# Patient Record
Sex: FEMALE | Race: WHITE | NOT HISPANIC OR LATINO | Employment: OTHER | URBAN - METROPOLITAN AREA
[De-identification: names, ages, dates, MRNs, and addresses within clinical notes are randomized per-mention and may not be internally consistent; named-entity substitution may affect disease eponyms.]

---

## 2023-03-10 DIAGNOSIS — J45.909 ASTHMA, UNSPECIFIED ASTHMA SEVERITY, UNSPECIFIED WHETHER COMPLICATED, UNSPECIFIED WHETHER PERSISTENT (HHS-HCC): ICD-10-CM

## 2023-03-10 DIAGNOSIS — J44.9 CHRONIC OBSTRUCTIVE PULMONARY DISEASE, UNSPECIFIED (MULTI): ICD-10-CM

## 2023-03-10 RX ORDER — IPRATROPIUM BROMIDE AND ALBUTEROL SULFATE 2.5; .5 MG/3ML; MG/3ML
SOLUTION RESPIRATORY (INHALATION)
Qty: 270 ML | Refills: 1 | Status: SHIPPED | OUTPATIENT
Start: 2023-03-10

## 2023-03-13 DIAGNOSIS — J44.9 CHRONIC OBSTRUCTIVE PULMONARY DISEASE, UNSPECIFIED COPD TYPE (MULTI): ICD-10-CM

## 2023-03-13 PROBLEM — E78.5 HYPERLIPIDEMIA: Status: ACTIVE | Noted: 2023-03-13

## 2023-03-13 PROBLEM — R55 NEAR SYNCOPE: Status: ACTIVE | Noted: 2023-03-13

## 2023-03-13 PROBLEM — J42 CHRONIC BRONCHITIS (MULTI): Status: ACTIVE | Noted: 2023-03-13

## 2023-03-13 PROBLEM — J98.4 PNEUMONITIS: Status: ACTIVE | Noted: 2023-03-13

## 2023-03-13 PROBLEM — E87.6 HYPOKALEMIA: Status: ACTIVE | Noted: 2023-03-13

## 2023-03-13 PROBLEM — R60.0 LEG EDEMA: Status: ACTIVE | Noted: 2023-03-13

## 2023-03-13 PROBLEM — M48.56XA: Status: ACTIVE | Noted: 2023-03-13

## 2023-03-13 PROBLEM — K76.0 FATTY LIVER: Status: ACTIVE | Noted: 2023-03-13

## 2023-03-13 PROBLEM — T14.8XXA MUSCLE STRAIN: Status: ACTIVE | Noted: 2023-03-13

## 2023-03-13 PROBLEM — N39.0 UTI (URINARY TRACT INFECTION): Status: ACTIVE | Noted: 2023-03-13

## 2023-03-13 PROBLEM — G60.9 IDIOPATHIC PERIPHERAL NEUROPATHY: Status: ACTIVE | Noted: 2023-03-13

## 2023-03-13 PROBLEM — I10 HTN (HYPERTENSION): Status: ACTIVE | Noted: 2023-03-13

## 2023-03-13 PROBLEM — E78.49 FAMILIAL COMBINED HYPERLIPIDEMIA: Status: ACTIVE | Noted: 2023-03-13

## 2023-03-13 PROBLEM — R20.0 NUMBNESS AND TINGLING: Status: ACTIVE | Noted: 2023-03-13

## 2023-03-13 PROBLEM — E83.42 HYPOMAGNESEMIA: Status: ACTIVE | Noted: 2023-03-13

## 2023-03-13 PROBLEM — L98.9 FACIAL SKIN LESION: Status: ACTIVE | Noted: 2023-03-13

## 2023-03-13 PROBLEM — R53.83 FATIGUE: Status: ACTIVE | Noted: 2023-03-13

## 2023-03-13 PROBLEM — F10.10 ALCOHOL ABUSE: Status: ACTIVE | Noted: 2023-03-13

## 2023-03-13 PROBLEM — R91.8 OPACITY OF LUNG ON IMAGING STUDY: Status: ACTIVE | Noted: 2023-03-13

## 2023-03-13 PROBLEM — K74.60 CIRRHOSIS (MULTI): Status: ACTIVE | Noted: 2023-03-13

## 2023-03-13 PROBLEM — M62.830 BACK MUSCLE SPASM: Status: ACTIVE | Noted: 2023-03-13

## 2023-03-13 PROBLEM — R20.2 NUMBNESS AND TINGLING: Status: ACTIVE | Noted: 2023-03-13

## 2023-03-13 PROBLEM — E55.9 VITAMIN D DEFICIENCY: Status: ACTIVE | Noted: 2023-03-13

## 2023-03-13 PROBLEM — J45.909 ASTHMA (HHS-HCC): Status: ACTIVE | Noted: 2023-03-13

## 2023-03-13 PROBLEM — A08.4 VIRAL GASTROENTERITIS: Status: ACTIVE | Noted: 2023-03-13

## 2023-03-13 PROBLEM — R06.02 SOB (SHORTNESS OF BREATH): Status: ACTIVE | Noted: 2023-03-13

## 2023-03-13 PROBLEM — R18.8 ASCITES: Status: ACTIVE | Noted: 2023-03-13

## 2023-03-13 PROBLEM — I73.9 PAD (PERIPHERAL ARTERY DISEASE) (CMS-HCC): Status: ACTIVE | Noted: 2023-03-13

## 2023-03-13 PROBLEM — E86.0 DEHYDRATION: Status: ACTIVE | Noted: 2023-03-13

## 2023-03-13 PROBLEM — I65.29 CAROTID STENOSIS: Status: ACTIVE | Noted: 2023-03-13

## 2023-03-13 PROBLEM — R00.2 HEART PALPITATIONS: Status: ACTIVE | Noted: 2023-03-13

## 2023-03-13 PROBLEM — R14.0 DISTENDED ABDOMEN: Status: ACTIVE | Noted: 2023-03-13

## 2023-03-17 RX ORDER — PANTOPRAZOLE SODIUM 40 MG/1
1 TABLET, DELAYED RELEASE ORAL DAILY
COMMUNITY
Start: 2020-08-04 | End: 2024-05-23 | Stop reason: ENTERED-IN-ERROR

## 2023-03-17 RX ORDER — FOLIC ACID 1 MG/1
1 TABLET ORAL DAILY
COMMUNITY
Start: 2020-08-04 | End: 2024-05-23 | Stop reason: ENTERED-IN-ERROR

## 2023-03-17 RX ORDER — LANOLIN ALCOHOL/MO/W.PET/CERES
1 CREAM (GRAM) TOPICAL DAILY
COMMUNITY
Start: 2020-08-04 | End: 2024-05-23 | Stop reason: ENTERED-IN-ERROR

## 2023-03-17 RX ORDER — IPRATROPIUM BROMIDE AND ALBUTEROL SULFATE 2.5; .5 MG/3ML; MG/3ML
SOLUTION RESPIRATORY (INHALATION)
COMMUNITY
Start: 2020-11-12 | End: 2024-05-23 | Stop reason: ENTERED-IN-ERROR

## 2023-03-17 RX ORDER — FERROUS SULFATE 325(65) MG
1 TABLET ORAL DAILY
COMMUNITY
Start: 2020-08-04 | End: 2024-05-23 | Stop reason: ENTERED-IN-ERROR

## 2023-03-17 RX ORDER — ALBUTEROL SULFATE 90 UG/1
2 AEROSOL, METERED RESPIRATORY (INHALATION) EVERY 4 HOURS PRN
COMMUNITY
Start: 2020-08-04

## 2023-03-17 RX ORDER — UBIDECARENONE 75 MG
1 CAPSULE ORAL DAILY
COMMUNITY
Start: 2020-08-04 | End: 2024-05-23 | Stop reason: ENTERED-IN-ERROR

## 2023-03-17 RX ORDER — FLUTICASONE FUROATE, UMECLIDINIUM BROMIDE AND VILANTEROL TRIFENATATE 100; 62.5; 25 UG/1; UG/1; UG/1
1 POWDER RESPIRATORY (INHALATION) DAILY
Qty: 90 EACH | Refills: 3 | Status: ON HOLD | OUTPATIENT
Start: 2023-03-17 | End: 2024-05-26

## 2023-05-10 DIAGNOSIS — T14.8XXA MUSCLE STRAIN: ICD-10-CM

## 2023-05-11 RX ORDER — DICLOFENAC SODIUM 10 MG/G
4 GEL TOPICAL 4 TIMES DAILY PRN
Qty: 100 G | Refills: 0 | Status: SHIPPED | OUTPATIENT
Start: 2023-05-11 | End: 2024-05-27 | Stop reason: HOSPADM

## 2023-06-26 DIAGNOSIS — J44.9 CHRONIC OBSTRUCTIVE PULMONARY DISEASE, UNSPECIFIED COPD TYPE (MULTI): ICD-10-CM

## 2023-06-26 RX ORDER — FLUTICASONE PROPIONATE AND SALMETEROL 250; 50 UG/1; UG/1
1 POWDER RESPIRATORY (INHALATION)
Qty: 60 EACH | Refills: 11 | Status: SHIPPED | OUTPATIENT
Start: 2023-06-26 | End: 2024-06-05 | Stop reason: ALTCHOICE

## 2023-06-30 ENCOUNTER — OFFICE VISIT (OUTPATIENT)
Dept: PRIMARY CARE | Facility: CLINIC | Age: 73
End: 2023-06-30
Payer: MEDICARE

## 2023-06-30 VITALS
BODY MASS INDEX: 21.44 KG/M2 | SYSTOLIC BLOOD PRESSURE: 160 MMHG | RESPIRATION RATE: 12 BRPM | OXYGEN SATURATION: 95 % | WEIGHT: 141 LBS | DIASTOLIC BLOOD PRESSURE: 84 MMHG | HEART RATE: 105 BPM | TEMPERATURE: 97.9 F

## 2023-06-30 DIAGNOSIS — J44.9 CHRONIC OBSTRUCTIVE PULMONARY DISEASE, UNSPECIFIED COPD TYPE (MULTI): ICD-10-CM

## 2023-06-30 DIAGNOSIS — H04.129 DRY EYE: ICD-10-CM

## 2023-06-30 DIAGNOSIS — I10 HYPERTENSION, UNSPECIFIED TYPE: ICD-10-CM

## 2023-06-30 DIAGNOSIS — J44.9 CHRONIC OBSTRUCTIVE PULMONARY DISEASE, UNSPECIFIED COPD TYPE (MULTI): Primary | ICD-10-CM

## 2023-06-30 DIAGNOSIS — G89.29 CHRONIC NONINTRACTABLE HEADACHE, UNSPECIFIED HEADACHE TYPE: ICD-10-CM

## 2023-06-30 DIAGNOSIS — R51.9 CHRONIC NONINTRACTABLE HEADACHE, UNSPECIFIED HEADACHE TYPE: ICD-10-CM

## 2023-06-30 PROCEDURE — 1159F MED LIST DOCD IN RCRD: CPT | Performed by: INTERNAL MEDICINE

## 2023-06-30 PROCEDURE — 1036F TOBACCO NON-USER: CPT | Performed by: INTERNAL MEDICINE

## 2023-06-30 PROCEDURE — 1157F ADVNC CARE PLAN IN RCRD: CPT | Performed by: INTERNAL MEDICINE

## 2023-06-30 PROCEDURE — 3079F DIAST BP 80-89 MM HG: CPT | Performed by: INTERNAL MEDICINE

## 2023-06-30 PROCEDURE — 3077F SYST BP >= 140 MM HG: CPT | Performed by: INTERNAL MEDICINE

## 2023-06-30 PROCEDURE — 99214 OFFICE O/P EST MOD 30 MIN: CPT | Performed by: INTERNAL MEDICINE

## 2023-06-30 RX ORDER — FLUTICASONE PROPIONATE 220 UG/1
2 AEROSOL, METERED RESPIRATORY (INHALATION)
Qty: 12 G | Refills: 5 | Status: SHIPPED | OUTPATIENT
Start: 2023-06-30 | End: 2023-06-30

## 2023-06-30 RX ORDER — FLUTICASONE PROPIONATE 220 UG/1
2 AEROSOL, METERED RESPIRATORY (INHALATION)
Qty: 12 G | Refills: 2 | Status: SHIPPED | OUTPATIENT
Start: 2023-06-30 | End: 2024-05-27 | Stop reason: HOSPADM

## 2023-06-30 ASSESSMENT — ENCOUNTER SYMPTOMS: HEADACHES: 1

## 2023-06-30 NOTE — ASSESSMENT & PLAN NOTE
Elevated in the office today.  Patient states that this is more elevated than her home blood pressure readings.  Likely due to stress.  Continue to monitor.
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
## 2023-06-30 NOTE — ASSESSMENT & PLAN NOTE
Patient reports no recent exacerbation despite current air quality.  Denies worsening cough or colored phlegm.  Has been using nebulizer for rescue treatment.  Begin Flovent daily for maintenance treatment.

## 2023-09-19 ENCOUNTER — PATIENT OUTREACH (OUTPATIENT)
Dept: CARE COORDINATION | Facility: CLINIC | Age: 73
End: 2023-09-19
Payer: MEDICARE

## 2023-09-19 NOTE — PROGRESS NOTES
Outreach call to patient to support a smooth transition of care from recent admission.  Left voicemail message for patient with my contact information.  Cecilia Rubio RN/CM

## 2023-09-25 DIAGNOSIS — J44.9 CHRONIC OBSTRUCTIVE PULMONARY DISEASE, UNSPECIFIED COPD TYPE (MULTI): Primary | ICD-10-CM

## 2023-09-27 PROBLEM — I45.9 CARDIAC CONDUCTION DISORDER: Status: ACTIVE | Noted: 2023-09-15

## 2023-09-27 PROBLEM — I10 ESSENTIAL (PRIMARY) HYPERTENSION: Status: ACTIVE | Noted: 2020-08-04

## 2023-09-27 PROBLEM — R00.0 TACHYCARDIA: Status: ACTIVE | Noted: 2023-09-27

## 2023-09-27 PROBLEM — R79.89 OTHER SPECIFIED ABNORMAL FINDINGS OF BLOOD CHEMISTRY: Status: ACTIVE | Noted: 2023-09-27

## 2023-09-27 PROBLEM — G60.9 HEREDITARY AND IDIOPATHIC NEUROPATHY: Status: ACTIVE | Noted: 2020-08-04

## 2023-09-27 PROBLEM — I70.203: Status: ACTIVE | Noted: 2020-08-04

## 2023-09-27 PROBLEM — E83.30 DISORDER OF PHOSPHORUS METABOLISM: Status: ACTIVE | Noted: 2020-08-04

## 2023-09-27 PROBLEM — Z87.81 PERSONAL HISTORY OF (HEALED) TRAUMATIC FRACTURE: Status: ACTIVE | Noted: 2020-08-04

## 2023-09-27 PROBLEM — K70.30 ALCOHOLIC CIRRHOSIS OF LIVER WITHOUT ASCITES (MULTI): Status: ACTIVE | Noted: 2020-08-04

## 2023-09-27 PROBLEM — F17.200 NICOTINE DEPENDENCE: Status: ACTIVE | Noted: 2020-08-04

## 2023-09-27 PROBLEM — M06.9 RHEUMATOID ARTHRITIS (MULTI): Status: ACTIVE | Noted: 2020-08-04

## 2023-09-27 PROBLEM — K70.10 ALCOHOLIC HEPATITIS WITHOUT ASCITES (MULTI): Status: ACTIVE | Noted: 2020-08-04

## 2023-09-27 RX ORDER — MULTIVITAMIN WITH IRON
TABLET ORAL
COMMUNITY
Start: 2020-08-10 | End: 2024-05-23 | Stop reason: ENTERED-IN-ERROR

## 2023-09-27 RX ORDER — MIDODRINE HYDROCHLORIDE 2.5 MG/1
TABLET ORAL
COMMUNITY
Start: 2020-07-30 | End: 2024-05-23 | Stop reason: ENTERED-IN-ERROR

## 2023-09-27 RX ORDER — POTASSIUM CHLORIDE 20 MEQ/1
TABLET, EXTENDED RELEASE ORAL
COMMUNITY
Start: 2020-08-04 | End: 2024-05-23 | Stop reason: ENTERED-IN-ERROR

## 2023-09-27 RX ORDER — LACTULOSE 10 G/15ML
SOLUTION ORAL; RECTAL
COMMUNITY
Start: 2020-08-11 | End: 2024-05-23 | Stop reason: ENTERED-IN-ERROR

## 2023-09-27 RX ORDER — FUROSEMIDE 20 MG/1
TABLET ORAL
COMMUNITY
Start: 2020-07-31 | End: 2024-05-23 | Stop reason: ENTERED-IN-ERROR

## 2023-10-02 ENCOUNTER — OFFICE VISIT (OUTPATIENT)
Dept: SURGERY | Facility: CLINIC | Age: 73
End: 2023-10-02
Payer: MEDICARE

## 2023-10-02 DIAGNOSIS — Z09 POSTOPERATIVE EXAMINATION: Primary | ICD-10-CM

## 2023-10-02 DIAGNOSIS — M05.7A RHEUMATOID ARTHRITIS OF OTHER SITE WITH POSITIVE RHEUMATOID FACTOR (MULTI): ICD-10-CM

## 2023-10-02 DIAGNOSIS — K70.30 ALCOHOLIC CIRRHOSIS OF LIVER WITHOUT ASCITES (MULTI): ICD-10-CM

## 2023-10-02 PROCEDURE — 1159F MED LIST DOCD IN RCRD: CPT | Performed by: SURGERY

## 2023-10-02 PROCEDURE — 1126F AMNT PAIN NOTED NONE PRSNT: CPT | Performed by: SURGERY

## 2023-10-02 PROCEDURE — 1160F RVW MEDS BY RX/DR IN RCRD: CPT | Performed by: SURGERY

## 2023-10-02 PROCEDURE — 99024 POSTOP FOLLOW-UP VISIT: CPT | Performed by: SURGERY

## 2023-10-02 ASSESSMENT — PAIN SCALES - GENERAL: PAINLEVEL: 0-NO PAIN

## 2023-10-02 NOTE — PROGRESS NOTES
Subjective   The patient is status post laparoscopic appendectomy for perforated appendix.  The patient is not having any pain. Patient is eating well.    Objective   Physical Exam:  General: well appearing, no acute distress, alert and oriented x 3  Eyes: PERRLA, EOMI  HEENT: normal  Neck: supple  Pulmonary: lungs clear to auscultation bilaterally  CV: RR, S1S2, no murmurs  Abdomen: soft, non tender, no masses  Minimal soreness around umbilicus,  Wounds all nicely healed.    MS: grossly normal  Neurologic: alert and oriented, strength/sensation intact  Skin: non jaundiced, no lesions    Pathology:   eport Name JARRED VALDEZ                                                                                                   Accession #: [id number]            Pathologist:                   DINA OBRIEN MD  Date of Procedure:    9/15/2023  Date Received:          9/15/2023  Date Reported           9/21/2023  Submitting Physician:   KWABENA LOMELI MD.  Location:                    [facility]  Copy To/Referring/Attending:  LILLIE ALEXANDER Other External #                                                                   FINAL DIAGNOSIS  A.  APPENDIX:  -- ACUTE APPENDICITIS       Assessment/Plan   Status post laparoscopic appendectomy for perforated appendix.  Excellent recovery.  She can resume activities as tolerated.  Follow-up with PCP later this fall.  She will see me as needed

## 2023-10-02 NOTE — PROGRESS NOTES
History Of Present Illness  Svitlana Perry is a 73 y.o. female presenting with ***.     Past Medical History  She has no past medical history on file.    Surgical History  She has a past surgical history that includes Other surgical history (02/15/2016); MR angio neck wo IV contrast (8/19/2019); MR angio head wo IV contrast (8/19/2019); and MR angio neck wo IV contrast (11/30/2022).     Social History  She reports that she has been smoking cigarettes. She has never used smokeless tobacco. She reports that she does not currently use alcohol. She reports that she does not use drugs.    Family History  No family history on file.     Allergies  Patient has no known allergies.    Review of Systems     Physical Exam     Last Recorded Vitals  There were no vitals taken for this visit.    Relevant Results  {If you would like to pull in Medications, type .meds     If you would like to pull in Lab results for the last 24 hours, type .[id number]    If you would like to pull in Imaging results, type .imgrslt :99}      ***     Assessment/Plan   {Assess/PlanSmartLinks:73628}    ***       I spent *** minutes in the professional and overall care of this patient.      Abilio Woodruff MD

## 2023-10-02 NOTE — LETTER
Svitlana Perry recovering quite well following recent laparoscopic appendectomy.  I will see her as needed.  Please call for any questions    Tadeo

## 2023-10-05 ENCOUNTER — PATIENT OUTREACH (OUTPATIENT)
Dept: CARE COORDINATION | Facility: CLINIC | Age: 73
End: 2023-10-05
Payer: MEDICARE

## 2023-10-05 NOTE — PROGRESS NOTES
Outreach call to patient following up on appointment with primary care provider. Left voicemail message with CM name and contact number.  Will continue to follow.    Cecilia Rubio RN/MARRY  [phone]

## 2023-11-02 ENCOUNTER — PATIENT OUTREACH (OUTPATIENT)
Dept: CARE COORDINATION | Facility: CLINIC | Age: 73
End: 2023-11-02
Payer: MEDICARE

## 2023-11-02 NOTE — PROGRESS NOTES
Outreach call to patient to check in 30 days after hospital discharge to support smooth transition of care.  Left voicemail message with CM name and contact number.Patient with no additional needs noted. No additional outreach needed at this time.   Cecilia Rubio RN

## 2024-02-29 ENCOUNTER — APPOINTMENT (OUTPATIENT)
Dept: PRIMARY CARE | Facility: CLINIC | Age: 74
End: 2024-02-29
Payer: COMMERCIAL

## 2024-03-07 ENCOUNTER — APPOINTMENT (OUTPATIENT)
Dept: PRIMARY CARE | Facility: CLINIC | Age: 74
End: 2024-03-07
Payer: COMMERCIAL

## 2024-05-23 ENCOUNTER — APPOINTMENT (OUTPATIENT)
Dept: RADIOLOGY | Facility: HOSPITAL | Age: 74
DRG: 640 | End: 2024-05-23
Payer: COMMERCIAL

## 2024-05-23 ENCOUNTER — APPOINTMENT (OUTPATIENT)
Dept: CARDIOLOGY | Facility: HOSPITAL | Age: 74
DRG: 640 | End: 2024-05-23
Payer: COMMERCIAL

## 2024-05-23 ENCOUNTER — HOSPITAL ENCOUNTER (INPATIENT)
Facility: HOSPITAL | Age: 74
LOS: 4 days | Discharge: HOME HEALTH | DRG: 640 | End: 2024-05-27
Attending: STUDENT IN AN ORGANIZED HEALTH CARE EDUCATION/TRAINING PROGRAM | Admitting: INTERNAL MEDICINE
Payer: COMMERCIAL

## 2024-05-23 DIAGNOSIS — R53.1 GENERALIZED WEAKNESS: Primary | ICD-10-CM

## 2024-05-23 DIAGNOSIS — J45.909 ASTHMA, UNSPECIFIED ASTHMA SEVERITY, UNSPECIFIED WHETHER COMPLICATED, UNSPECIFIED WHETHER PERSISTENT (HHS-HCC): ICD-10-CM

## 2024-05-23 DIAGNOSIS — E83.42 HYPOMAGNESEMIA: ICD-10-CM

## 2024-05-23 DIAGNOSIS — E87.6 HYPOKALEMIA: ICD-10-CM

## 2024-05-23 DIAGNOSIS — K70.10 ALCOHOLIC HEPATITIS WITHOUT ASCITES (MULTI): ICD-10-CM

## 2024-05-23 DIAGNOSIS — J44.1 COPD EXACERBATION (MULTI): ICD-10-CM

## 2024-05-23 DIAGNOSIS — R62.7 FAILURE TO THRIVE IN ADULT: ICD-10-CM

## 2024-05-23 PROBLEM — R09.02 HYPOXIA: Status: ACTIVE | Noted: 2024-05-23

## 2024-05-23 PROBLEM — R79.89 ELEVATED TROPONIN: Status: ACTIVE | Noted: 2024-05-23

## 2024-05-23 PROBLEM — R21 RASH: Status: ACTIVE | Noted: 2024-05-23

## 2024-05-23 PROBLEM — E87.1 HYPONATREMIA: Status: ACTIVE | Noted: 2024-05-23

## 2024-05-23 PROBLEM — R74.8 ELEVATED LIVER ENZYMES: Status: ACTIVE | Noted: 2024-05-23

## 2024-05-23 PROBLEM — R26.2 UNABLE TO AMBULATE: Status: ACTIVE | Noted: 2024-05-23

## 2024-05-23 LAB
ALBUMIN SERPL BCP-MCNC: 3.8 G/DL (ref 3.4–5)
ALP SERPL-CCNC: 411 U/L (ref 33–136)
ALT SERPL W P-5'-P-CCNC: 42 U/L (ref 7–45)
ANION GAP BLDV CALCULATED.4IONS-SCNC: 13 MMOL/L (ref 10–25)
ANION GAP SERPL CALC-SCNC: 23 MMOL/L (ref 10–20)
APPEARANCE UR: ABNORMAL
APTT PPP: 29 SECONDS (ref 27–38)
AST SERPL W P-5'-P-CCNC: 106 U/L (ref 9–39)
BACTERIA #/AREA URNS AUTO: ABNORMAL /HPF
BASE EXCESS BLDV CALC-SCNC: -0.7 MMOL/L (ref -2–3)
BASOPHILS # BLD AUTO: 0.04 X10*3/UL (ref 0–0.1)
BASOPHILS NFR BLD AUTO: 0.9 %
BILIRUB SERPL-MCNC: 4.8 MG/DL (ref 0–1.2)
BILIRUB UR STRIP.AUTO-MCNC: ABNORMAL MG/DL
BNP SERPL-MCNC: 120 PG/ML (ref 0–99)
BODY TEMPERATURE: 37 DEGREES CELSIUS
BUN SERPL-MCNC: 16 MG/DL (ref 6–23)
CA-I BLDV-SCNC: 1.24 MMOL/L (ref 1.1–1.33)
CALCIUM SERPL-MCNC: 8.8 MG/DL (ref 8.6–10.3)
CARDIAC TROPONIN I PNL SERPL HS: 12 NG/L (ref 0–13)
CARDIAC TROPONIN I PNL SERPL HS: 15 NG/L (ref 0–13)
CHLORIDE BLDV-SCNC: 95 MMOL/L (ref 98–107)
CHLORIDE SERPL-SCNC: 94 MMOL/L (ref 98–107)
CK SERPL-CCNC: 30 U/L (ref 0–215)
CO2 SERPL-SCNC: 18 MMOL/L (ref 21–32)
COLOR UR: YELLOW
CREAT SERPL-MCNC: 0.69 MG/DL (ref 0.5–1.05)
D DIMER PPP FEU-MCNC: 1824 NG/ML FEU
EGFRCR SERPLBLD CKD-EPI 2021: >90 ML/MIN/1.73M*2
EOSINOPHIL # BLD AUTO: 0.07 X10*3/UL (ref 0–0.4)
EOSINOPHIL NFR BLD AUTO: 1.5 %
ERYTHROCYTE [DISTWIDTH] IN BLOOD BY AUTOMATED COUNT: 12.9 % (ref 11.5–14.5)
FLUAV RNA RESP QL NAA+PROBE: NOT DETECTED
FLUBV RNA RESP QL NAA+PROBE: NOT DETECTED
GLUCOSE BLDV-MCNC: 114 MG/DL (ref 74–99)
GLUCOSE SERPL-MCNC: 68 MG/DL (ref 74–99)
GLUCOSE UR STRIP.AUTO-MCNC: NORMAL MG/DL
HCO3 BLDV-SCNC: 24.2 MMOL/L (ref 22–26)
HCT VFR BLD AUTO: 33.1 % (ref 36–46)
HCT VFR BLD EST: 38 % (ref 36–46)
HGB BLD-MCNC: 12.2 G/DL (ref 12–16)
HGB BLDV-MCNC: 12.7 G/DL (ref 12–16)
IMM GRANULOCYTES # BLD AUTO: 0.11 X10*3/UL (ref 0–0.5)
IMM GRANULOCYTES NFR BLD AUTO: 2.4 % (ref 0–0.9)
INHALED O2 CONCENTRATION: 21 %
INR PPP: 1 (ref 0.9–1.1)
KETONES UR STRIP.AUTO-MCNC: ABNORMAL MG/DL
LACTATE BLDV-SCNC: 1.4 MMOL/L (ref 0.4–2)
LEUKOCYTE ESTERASE UR QL STRIP.AUTO: NEGATIVE
LYMPHOCYTES # BLD AUTO: 0.67 X10*3/UL (ref 0.8–3)
LYMPHOCYTES NFR BLD AUTO: 14.8 %
MAGNESIUM SERPL-MCNC: 1.3 MG/DL (ref 1.6–2.4)
MCH RBC QN AUTO: 36.1 PG (ref 26–34)
MCHC RBC AUTO-ENTMCNC: 36.9 G/DL (ref 32–36)
MCV RBC AUTO: 98 FL (ref 80–100)
MONOCYTES # BLD AUTO: 0.3 X10*3/UL (ref 0.05–0.8)
MONOCYTES NFR BLD AUTO: 6.6 %
MUCOUS THREADS #/AREA URNS AUTO: ABNORMAL /LPF
NEUTROPHILS # BLD AUTO: 3.35 X10*3/UL (ref 1.6–5.5)
NEUTROPHILS NFR BLD AUTO: 73.8 %
NITRITE UR QL STRIP.AUTO: NEGATIVE
NRBC BLD-RTO: 0 /100 WBCS (ref 0–0)
OXYHGB MFR BLDV: 47.2 % (ref 45–75)
PCO2 BLDV: 40 MM HG (ref 41–51)
PH BLDV: 7.39 PH (ref 7.33–7.43)
PH UR STRIP.AUTO: 6.5 [PH]
PLATELET # BLD AUTO: 59 X10*3/UL (ref 150–450)
PO2 BLDV: 25 MM HG (ref 35–45)
POTASSIUM BLDV-SCNC: 2.9 MMOL/L (ref 3.5–5.3)
POTASSIUM SERPL-SCNC: 3.3 MMOL/L (ref 3.5–5.3)
PROT SERPL-MCNC: 6.3 G/DL (ref 6.4–8.2)
PROT UR STRIP.AUTO-MCNC: ABNORMAL MG/DL
PROTHROMBIN TIME: 11.7 SECONDS (ref 9.8–12.8)
RBC # BLD AUTO: 3.38 X10*6/UL (ref 4–5.2)
RBC # UR STRIP.AUTO: ABNORMAL /UL
RBC #/AREA URNS AUTO: ABNORMAL /HPF
SAO2 % BLDV: 50 % (ref 45–75)
SARS-COV-2 RNA RESP QL NAA+PROBE: NOT DETECTED
SODIUM BLDV-SCNC: 129 MMOL/L (ref 136–145)
SODIUM SERPL-SCNC: 132 MMOL/L (ref 136–145)
SP GR UR STRIP.AUTO: 1.01
SQUAMOUS #/AREA URNS AUTO: ABNORMAL /HPF
UROBILINOGEN UR STRIP.AUTO-MCNC: ABNORMAL MG/DL
WBC # BLD AUTO: 4.5 X10*3/UL (ref 4.4–11.3)
WBC #/AREA URNS AUTO: ABNORMAL /HPF

## 2024-05-23 PROCEDURE — 82550 ASSAY OF CK (CPK): CPT | Performed by: STUDENT IN AN ORGANIZED HEALTH CARE EDUCATION/TRAINING PROGRAM

## 2024-05-23 PROCEDURE — 96365 THER/PROPH/DIAG IV INF INIT: CPT

## 2024-05-23 PROCEDURE — 99285 EMERGENCY DEPT VISIT HI MDM: CPT | Mod: 25

## 2024-05-23 PROCEDURE — 83735 ASSAY OF MAGNESIUM: CPT | Performed by: STUDENT IN AN ORGANIZED HEALTH CARE EDUCATION/TRAINING PROGRAM

## 2024-05-23 PROCEDURE — 2500000002 HC RX 250 W HCPCS SELF ADMINISTERED DRUGS (ALT 637 FOR MEDICARE OP, ALT 636 FOR OP/ED): Performed by: PHYSICIAN ASSISTANT

## 2024-05-23 PROCEDURE — 2500000004 HC RX 250 GENERAL PHARMACY W/ HCPCS (ALT 636 FOR OP/ED): Performed by: STUDENT IN AN ORGANIZED HEALTH CARE EDUCATION/TRAINING PROGRAM

## 2024-05-23 PROCEDURE — 83880 ASSAY OF NATRIURETIC PEPTIDE: CPT | Performed by: STUDENT IN AN ORGANIZED HEALTH CARE EDUCATION/TRAINING PROGRAM

## 2024-05-23 PROCEDURE — 36415 COLL VENOUS BLD VENIPUNCTURE: CPT | Performed by: STUDENT IN AN ORGANIZED HEALTH CARE EDUCATION/TRAINING PROGRAM

## 2024-05-23 PROCEDURE — 96366 THER/PROPH/DIAG IV INF ADDON: CPT

## 2024-05-23 PROCEDURE — 85025 COMPLETE CBC W/AUTO DIFF WBC: CPT | Performed by: STUDENT IN AN ORGANIZED HEALTH CARE EDUCATION/TRAINING PROGRAM

## 2024-05-23 PROCEDURE — 1100000001 HC PRIVATE ROOM DAILY

## 2024-05-23 PROCEDURE — 36415 COLL VENOUS BLD VENIPUNCTURE: CPT | Performed by: PHYSICIAN ASSISTANT

## 2024-05-23 PROCEDURE — 76705 ECHO EXAM OF ABDOMEN: CPT

## 2024-05-23 PROCEDURE — 84484 ASSAY OF TROPONIN QUANT: CPT | Performed by: STUDENT IN AN ORGANIZED HEALTH CARE EDUCATION/TRAINING PROGRAM

## 2024-05-23 PROCEDURE — 85379 FIBRIN DEGRADATION QUANT: CPT | Performed by: STUDENT IN AN ORGANIZED HEALTH CARE EDUCATION/TRAINING PROGRAM

## 2024-05-23 PROCEDURE — 76705 ECHO EXAM OF ABDOMEN: CPT | Performed by: RADIOLOGY

## 2024-05-23 PROCEDURE — 84132 ASSAY OF SERUM POTASSIUM: CPT | Mod: 91 | Performed by: STUDENT IN AN ORGANIZED HEALTH CARE EDUCATION/TRAINING PROGRAM

## 2024-05-23 PROCEDURE — 71275 CT ANGIOGRAPHY CHEST: CPT

## 2024-05-23 PROCEDURE — 93005 ELECTROCARDIOGRAM TRACING: CPT

## 2024-05-23 PROCEDURE — 71045 X-RAY EXAM CHEST 1 VIEW: CPT | Performed by: RADIOLOGY

## 2024-05-23 PROCEDURE — 87636 SARSCOV2 & INF A&B AMP PRB: CPT | Performed by: STUDENT IN AN ORGANIZED HEALTH CARE EDUCATION/TRAINING PROGRAM

## 2024-05-23 PROCEDURE — 71045 X-RAY EXAM CHEST 1 VIEW: CPT

## 2024-05-23 PROCEDURE — 96361 HYDRATE IV INFUSION ADD-ON: CPT

## 2024-05-23 PROCEDURE — 2500000001 HC RX 250 WO HCPCS SELF ADMINISTERED DRUGS (ALT 637 FOR MEDICARE OP): Performed by: STUDENT IN AN ORGANIZED HEALTH CARE EDUCATION/TRAINING PROGRAM

## 2024-05-23 PROCEDURE — 94640 AIRWAY INHALATION TREATMENT: CPT

## 2024-05-23 PROCEDURE — 71275 CT ANGIOGRAPHY CHEST: CPT | Performed by: RADIOLOGY

## 2024-05-23 PROCEDURE — 85730 THROMBOPLASTIN TIME PARTIAL: CPT | Mod: 91 | Performed by: STUDENT IN AN ORGANIZED HEALTH CARE EDUCATION/TRAINING PROGRAM

## 2024-05-23 PROCEDURE — 2500000006 HC RX 250 W HCPCS SELF ADMINISTERED DRUGS (ALT 637 FOR ALL PAYERS): Performed by: STUDENT IN AN ORGANIZED HEALTH CARE EDUCATION/TRAINING PROGRAM

## 2024-05-23 PROCEDURE — 81001 URINALYSIS AUTO W/SCOPE: CPT | Performed by: STUDENT IN AN ORGANIZED HEALTH CARE EDUCATION/TRAINING PROGRAM

## 2024-05-23 PROCEDURE — 96375 TX/PRO/DX INJ NEW DRUG ADDON: CPT

## 2024-05-23 PROCEDURE — 99223 1ST HOSP IP/OBS HIGH 75: CPT | Performed by: PHYSICIAN ASSISTANT

## 2024-05-23 PROCEDURE — 2550000001 HC RX 255 CONTRASTS: Performed by: STUDENT IN AN ORGANIZED HEALTH CARE EDUCATION/TRAINING PROGRAM

## 2024-05-23 RX ORDER — IPRATROPIUM BROMIDE AND ALBUTEROL SULFATE 2.5; .5 MG/3ML; MG/3ML
3 SOLUTION RESPIRATORY (INHALATION)
Status: DISCONTINUED | OUTPATIENT
Start: 2024-05-24 | End: 2024-05-28 | Stop reason: HOSPADM

## 2024-05-23 RX ORDER — PREDNISONE 20 MG/1
40 TABLET ORAL DAILY
Status: DISCONTINUED | OUTPATIENT
Start: 2024-05-24 | End: 2024-05-24

## 2024-05-23 RX ORDER — MULTIVIT-MIN/IRON FUM/FOLIC AC 7.5 MG-4
1 TABLET ORAL DAILY
Status: DISCONTINUED | OUTPATIENT
Start: 2024-05-23 | End: 2024-05-28 | Stop reason: HOSPADM

## 2024-05-23 RX ORDER — DIAZEPAM 5 MG/ML
10 INJECTION, SOLUTION INTRAMUSCULAR; INTRAVENOUS EVERY 2 HOUR PRN
Status: DISCONTINUED | OUTPATIENT
Start: 2024-05-23 | End: 2024-05-23

## 2024-05-23 RX ORDER — POTASSIUM CHLORIDE 14.9 MG/ML
20 INJECTION INTRAVENOUS ONCE
Status: COMPLETED | OUTPATIENT
Start: 2024-05-23 | End: 2024-05-23

## 2024-05-23 RX ORDER — FOLIC ACID 1 MG/1
1 TABLET ORAL DAILY
Status: DISCONTINUED | OUTPATIENT
Start: 2024-05-23 | End: 2024-05-28 | Stop reason: HOSPADM

## 2024-05-23 RX ORDER — ALBUTEROL SULFATE 0.83 MG/ML
2.5 SOLUTION RESPIRATORY (INHALATION) EVERY 4 HOURS PRN
Status: DISCONTINUED | OUTPATIENT
Start: 2024-05-23 | End: 2024-05-28 | Stop reason: HOSPADM

## 2024-05-23 RX ORDER — LANOLIN ALCOHOL/MO/W.PET/CERES
100 CREAM (GRAM) TOPICAL DAILY
Status: DISCONTINUED | OUTPATIENT
Start: 2024-05-26 | End: 2024-05-28 | Stop reason: HOSPADM

## 2024-05-23 RX ORDER — THIAMINE HYDROCHLORIDE 100 MG/ML
100 INJECTION, SOLUTION INTRAMUSCULAR; INTRAVENOUS DAILY
Status: COMPLETED | OUTPATIENT
Start: 2024-05-23 | End: 2024-05-25

## 2024-05-23 RX ORDER — PREDNISONE 20 MG/1
40 TABLET ORAL ONCE
Status: COMPLETED | OUTPATIENT
Start: 2024-05-23 | End: 2024-05-23

## 2024-05-23 RX ORDER — POTASSIUM CHLORIDE 20 MEQ/1
20 TABLET, EXTENDED RELEASE ORAL ONCE
Status: COMPLETED | OUTPATIENT
Start: 2024-05-23 | End: 2024-05-23

## 2024-05-23 RX ORDER — IPRATROPIUM BROMIDE AND ALBUTEROL SULFATE 2.5; .5 MG/3ML; MG/3ML
3 SOLUTION RESPIRATORY (INHALATION) EVERY 20 MIN
Status: DISPENSED | OUTPATIENT
Start: 2024-05-23 | End: 2024-05-23

## 2024-05-23 RX ORDER — MAGNESIUM SULFATE HEPTAHYDRATE 40 MG/ML
2 INJECTION, SOLUTION INTRAVENOUS ONCE
Status: COMPLETED | OUTPATIENT
Start: 2024-05-23 | End: 2024-05-23

## 2024-05-23 RX ORDER — IPRATROPIUM BROMIDE AND ALBUTEROL SULFATE 2.5; .5 MG/3ML; MG/3ML
3 SOLUTION RESPIRATORY (INHALATION)
Status: DISCONTINUED | OUTPATIENT
Start: 2024-05-23 | End: 2024-05-23

## 2024-05-23 RX ADMIN — IPRATROPIUM BROMIDE AND ALBUTEROL SULFATE 3 ML: 2.5; .5 SOLUTION RESPIRATORY (INHALATION) at 20:25

## 2024-05-23 RX ADMIN — Medication 1 TABLET: at 14:21

## 2024-05-23 RX ADMIN — MAGNESIUM SULFATE HEPTAHYDRATE 2 G: 40 INJECTION, SOLUTION INTRAVENOUS at 17:42

## 2024-05-23 RX ADMIN — PREDNISONE 40 MG: 20 TABLET ORAL at 14:24

## 2024-05-23 RX ADMIN — IPRATROPIUM BROMIDE AND ALBUTEROL SULFATE 3 ML: .5; 3 SOLUTION RESPIRATORY (INHALATION) at 19:25

## 2024-05-23 RX ADMIN — POTASSIUM CHLORIDE 20 MEQ: 14.9 INJECTION, SOLUTION INTRAVENOUS at 17:42

## 2024-05-23 RX ADMIN — SODIUM CHLORIDE, SODIUM LACTATE, POTASSIUM CHLORIDE, AND CALCIUM CHLORIDE 1000 ML: 600; 310; 30; 20 INJECTION, SOLUTION INTRAVENOUS at 12:00

## 2024-05-23 RX ADMIN — FOLIC ACID 1 MG: 1 TABLET ORAL at 14:21

## 2024-05-23 RX ADMIN — THIAMINE HYDROCHLORIDE 100 MG: 100 INJECTION, SOLUTION INTRAMUSCULAR; INTRAVENOUS at 17:55

## 2024-05-23 RX ADMIN — POTASSIUM CHLORIDE 20 MEQ: 1500 TABLET, EXTENDED RELEASE ORAL at 14:21

## 2024-05-23 RX ADMIN — IOHEXOL 75 ML: 350 INJECTION, SOLUTION INTRAVENOUS at 17:17

## 2024-05-23 ASSESSMENT — PAIN DESCRIPTION - PROGRESSION: CLINICAL_PROGRESSION: NOT CHANGED

## 2024-05-23 ASSESSMENT — COLUMBIA-SUICIDE SEVERITY RATING SCALE - C-SSRS
2. HAVE YOU ACTUALLY HAD ANY THOUGHTS OF KILLING YOURSELF?: NO
1. IN THE PAST MONTH, HAVE YOU WISHED YOU WERE DEAD OR WISHED YOU COULD GO TO SLEEP AND NOT WAKE UP?: NO
6. HAVE YOU EVER DONE ANYTHING, STARTED TO DO ANYTHING, OR PREPARED TO DO ANYTHING TO END YOUR LIFE?: NO

## 2024-05-23 ASSESSMENT — PAIN - FUNCTIONAL ASSESSMENT: PAIN_FUNCTIONAL_ASSESSMENT: 0-10

## 2024-05-23 ASSESSMENT — PAIN SCALES - GENERAL: PAINLEVEL_OUTOF10: 2

## 2024-05-23 NOTE — H&P
History Of Present Illness  Svitlana Perry is a 74 y.o. female presenting with general weakness, unable to ambulate, failure to thrive, hypoxia, tachycardia, COPD exacerbation, hypomagnesia and, hyponatremia, hypokalemia, elevated troponin, elevated liver enzymes.  Patient lives alone and states she has been unable to walk for the last 3 to 5 days because she feels weak.  She was on the couch and a neighbor has been helping her but then she could not get up at all.  She wears diapers for incontinence that is chronic but has not been able to change her diapers etc.  Either her sister who lives remotely or neighbor called EMS and the patient was brought to the emergency department.  She complains of a rash on her buttocks for a week or more but otherwise has no other complaints.  She states she has not had much appetite but she has been drinking water and her neighbor does bring her food.    Past medical history: Peripheral vascular disease, hepatitis B, hypertension, hyperlipidemia, rheumatoid arthritis, leg edema, neuropathy, COPD, pancreatitis, alcohol abuse, cirrhosis, carotid stenosis, ascites    Medications: The med reconciliation has not been completed and the review of her medication list shows very few recent if any prescriptions.  She has been on albuterol, Advair, DuoNebs in the past as well as midodrine, lactulose, Lasix, supplements, rifaximin but again none of these appear recent other than the aerosols.  Med reconciliation will need to be reviewed when it is completed.    Allergies: No known drug allergies    Social history: Patient states she smokes a pack of cigarettes a day and drinks a pint of vodka daily    ED course: Patient had a CMP showing glucose of 68, sodium 132, potassium 3.3, chloride 94, bicarb 18, anion gap 23, alk phos was 411, AST is 106, total bili was 4.8, the rest of the CMP was within normal limits  CK was normal at 30  Total protein low at 6.3, magnesium was low at 1.30, BNP  was 120, troponin was 15  INR was 1.0, D-dimer was elevated at 1824  CBC showed a normal white count, hemoglobin 12.2, hematocrit 33.1, platelets are low at 59,000, no left shift  Negative for influenza and COVID  pCO2 was 40 and the pO2 was 25  Urinalysis showed 2+ ketones, 3+ urobilinogen, 1+ bacteria, no WBCs, negative leuks negative nitrates.  CT angio chest showed no evidence of pulmonary embolism, she does have pulmonary nodules in the right upper lobe of 3 mm  Ultrasound of the gallbladder showed a fatty infiltration of the liver only.    Patient was treated in the ED with 2 g of magnesium IV, potassium 20 mEq orally and 20 mill equivalents IV, folic acid, thiamine, multivitamin  CIWA orders placed with Valium  DuoNeb in the ED for the tachypnea and hypoxemia which returned to normal on room air  She was given prednisone, and a liter of LR IV as well as a DuoNeb's etc.  EKG showed sinus tach at 109 no ischemia         No past medical history on file.  Past Surgical History:   Procedure Laterality Date    MR HEAD ANGIO WO IV CONTRAST  8/19/2019    MR HEAD ANGIO WO IV CONTRAST 8/19/2019 [facility] CLINICAL LEGAC[facility]    MR NECK ANGIO WO IV CONTRAST  8/19/2019    MR NECK ANGIO WO IV CONTRAST 8/19/2019 [facility] CLINICAL LEGACY    MR NECK ANGIO WO IV CONTRAST  11/30/2022    MR NECK ANGIO WO IV CONTRAST 11/30/2022 [facility] OFF[facility] LEGACY    OTHER SURGICAL HISTORY  02/15/2016    Wrist Surgery     Social History     Tobacco Use    Smoking status: Every Day     Types: Cigarettes    Smokeless tobacco: Never   Substance Use Topics    Alcohol use: Not Currently    Drug use: Never        Family History  No family history on file.     Allergies  Patient has no known allergies.    Review of Systems  Patient denies chest pain, shortness of breath, nausea, vomiting, fever, chills, diarrhea, constipation,  vision changes,  dysuria, paresthesias, vertigo, headache, cough or cold symptoms, or any other complaints at this time. A complete review  of systems was done, and is as stated in the history of present illness, is otherwise negative or not pertinent to the complaint.    Physical Exam  Physical exam: Vital signs and nurses notes were reviewed.    General:  no acute distress. Alert and oriented  x 4.  Poor personal hygiene    Head: atraumatic and normocephalic    Eyes: Pupils equal round reactive to light, EOMs are intact, conjunctivae is not injected.    Oropharynx: No erythema or exudate noted, no trismus or drooling, tongue is dry    Ears:  normal external exam, no swelling or erythema,     Nasal: normal external exam,     Neck: Supple, full range of motion,    Cardiac: Slightly tachycardic rate, regular rhythm no murmurs noted.     Pulmonary: Lungs clear bilaterally with good aeration. No adventitious breath sounds. No wheezes rales or rhonchi. No accessory muscle use no retraction noted.    Abdomen: Soft,  Nontender. No guarding, rigidity, or distention. Normoactive bowel sounds. No pulsatile masses, no bruits.     Extremities:  Full range of motion.  No pitting edema    Skin: Erythematous flat rash on the buttocks with some excoriation.  No vesicles or pustules, no signs of cellulitis or secondary infection.  Skin is warm and dry     Neuro: Patient is alert and oriented x4. Speech is clear. There is no asymmetry with facial grimaces, and no tongue deviation. Patient moves all extremities independently. Sensation is intact. No obvious neuro deficits are noted.  She moves her upper extremities weakly and there is very slight tremor when trying to hold up her arms.  She can lift her legs up independently.     Last Recorded Vitals  BP (!) 155/97   Pulse 87   Temp 36.8 °C (98.3 °F) (Oral)   Resp 16   Wt 56.7 kg (125 lb)   SpO2 99%     Relevant Results  Scheduled medications  fluticasone-umeclidin-vilanter, 1 puff, inhalation, BID  folic acid, 1 mg, oral, Daily  ipratropium-albuteroL, 3 mL, nebulization, q6h  magnesium sulfate, 2 g, intravenous,  Once  multivitamin with minerals, 1 tablet, oral, Daily  potassium chloride, 20 mEq, intravenous, Once  [START ON 5/24/2024] predniSONE, 40 mg, oral, Daily  [START ON 5/26/2024] thiamine, 100 mg, oral, Daily  thiamine, 100 mg, intravenous, Daily      Continuous medications     PRN medications  PRN medications: albuterol, oxygen    Results for orders placed or performed during the hospital encounter of 05/23/24 (from the past 24 hour(s))   CBC and Auto Differential   Result Value Ref Range    WBC 4.5 4.4 - 11.3 x10*3/uL    nRBC 0.0 0.0 - 0.0 /100 WBCs    RBC 3.38 (L) 4.00 - 5.20 x10*6/uL    Hemoglobin 12.2 12.0 - 16.0 g/dL    Hematocrit 33.1 (L) 36.0 - 46.0 %    MCV 98 80 - 100 fL    MCH 36.1 (H) 26.0 - 34.0 pg    MCHC 36.9 (H) 32.0 - 36.0 g/dL    RDW 12.9 11.5 - 14.5 %    Platelets 59 (L) 150 - 450 x10*3/uL    Neutrophils % 73.8 40.0 - 80.0 %    Immature Granulocytes %, Automated 2.4 (H) 0.0 - 0.9 %    Lymphocytes % 14.8 13.0 - 44.0 %    Monocytes % 6.6 2.0 - 10.0 %    Eosinophils % 1.5 0.0 - 6.0 %    Basophils % 0.9 0.0 - 2.0 %    Neutrophils Absolute 3.35 1.60 - 5.50 x10*3/uL    Immature Granulocytes Absolute, Automated 0.11 0.00 - 0.50 x10*3/uL    Lymphocytes Absolute 0.67 (L) 0.80 - 3.00 x10*3/uL    Monocytes Absolute 0.30 0.05 - 0.80 x10*3/uL    Eosinophils Absolute 0.07 0.00 - 0.40 x10*3/uL    Basophils Absolute 0.04 0.00 - 0.10 x10*3/uL   Comprehensive Metabolic Panel   Result Value Ref Range    Glucose 68 (L) 74 - 99 mg/dL    Sodium 132 (L) 136 - 145 mmol/L    Potassium 3.3 (L) 3.5 - 5.3 mmol/L    Chloride 94 (L) 98 - 107 mmol/L    Bicarbonate 18 (L) 21 - 32 mmol/L    Anion Gap 23 (H) 10 - 20 mmol/L    Urea Nitrogen 16 6 - 23 mg/dL    Creatinine 0.69 0.50 - 1.05 mg/dL    eGFR >90 >60 mL/min/1.73m*2    Calcium 8.8 8.6 - 10.3 mg/dL    Albumin 3.8 3.4 - 5.0 g/dL    Alkaline Phosphatase 411 (H) 33 - 136 U/L    Total Protein 6.3 (L) 6.4 - 8.2 g/dL     (H) 9 - 39 U/L    Bilirubin, Total 4.8 (H) 0.0 - 1.2  mg/dL    ALT 42 7 - 45 U/L   Magnesium   Result Value Ref Range    Magnesium 1.30 (L) 1.60 - 2.40 mg/dL   B-type natriuretic peptide   Result Value Ref Range     (H) 0 - 99 pg/mL   Creatine Kinase   Result Value Ref Range    Creatine Kinase 30 0 - 215 U/L   Troponin I, High Sensitivity, Initial   Result Value Ref Range    Troponin I, High Sensitivity 15 (H) 0 - 13 ng/L   D-dimer, VTE Exclusion   Result Value Ref Range    D-Dimer, Quantitative VTE Exclusion 1,824 (H) <=500 ng/mL FEU   Coagulation Screen   Result Value Ref Range    Protime 11.7 9.8 - 12.8 seconds    INR 1.0 0.9 - 1.1    aPTT 29 27 - 38 seconds   BLOOD GAS VENOUS FULL PANEL   Result Value Ref Range    POCT pH, Venous 7.39 7.33 - 7.43 pH    POCT pCO2, Venous 40 (L) 41 - 51 mm Hg    POCT pO2, Venous 25 (L) 35 - 45 mm Hg    POCT SO2, Venous 50 45 - 75 %    POCT Oxy Hemoglobin, Venous 47.2 45.0 - 75.0 %    POCT Hematocrit Calculated, Venous 38.0 36.0 - 46.0 %    POCT Sodium, Venous 129 (L) 136 - 145 mmol/L    POCT Potassium, Venous 2.9 (LL) 3.5 - 5.3 mmol/L    POCT Chloride, Venous 95 (L) 98 - 107 mmol/L    POCT Ionized Calicum, Venous 1.24 1.10 - 1.33 mmol/L    POCT Glucose, Venous 114 (H) 74 - 99 mg/dL    POCT Lactate, Venous 1.4 0.4 - 2.0 mmol/L    POCT Base Excess, Venous -0.7 -2.0 - 3.0 mmol/L    POCT HCO3 Calculated, Venous 24.2 22.0 - 26.0 mmol/L    POCT Hemoglobin, Venous 12.7 12.0 - 16.0 g/dL    POCT Anion Gap, Venous 13.0 10.0 - 25.0 mmol/L    Patient Temperature 37.0 degrees Celsius    FiO2 21 %   ECG 12 lead   Result Value Ref Range    Ventricular Rate 109 BPM    Atrial Rate 109 BPM    CA Interval 140 ms    QRS Duration 74 ms    QT Interval 324 ms    QTC Calculation(Bazett) 436 ms    P Axis 91 degrees    R Axis 86 degrees    T Axis 149 degrees    QRS Count 18 beats    Q Onset 222 ms    P Onset 152 ms    P Offset 208 ms    T Offset 384 ms    QTC Fredericia 395 ms   Urinalysis with Reflex Microscopic   Result Value Ref Range    Color,  Urine Yellow Light-Yellow, Yellow, Dark-Yellow    Appearance, Urine Turbid (N) Clear    Specific Gravity, Urine 1.014 1.005 - 1.035    pH, Urine 6.5 5.0, 5.5, 6.0, 6.5, 7.0, 7.5, 8.0    Protein, Urine 20 (TRACE) NEGATIVE, 10 (TRACE), 20 (TRACE) mg/dL    Glucose, Urine Normal Normal mg/dL    Blood, Urine 0.06 (1+) (A) NEGATIVE    Ketones, Urine 40 (2+) (A) NEGATIVE mg/dL    Bilirubin, Urine 0.5 (1+) (A) NEGATIVE    Urobilinogen, Urine 8 (3+) (A) Normal mg/dL    Nitrite, Urine NEGATIVE NEGATIVE    Leukocyte Esterase, Urine NEGATIVE NEGATIVE   Microscopic Only, Urine   Result Value Ref Range    WBC, Urine NONE 1-5, NONE /HPF    RBC, Urine 1-2 NONE, 1-2, 3-5 /HPF    Squamous Epithelial Cells, Urine 1-9 (SPARSE) Reference range not established. /HPF    Bacteria, Urine 1+ (A) NONE SEEN /HPF    Mucus, Urine FEW Reference range not established. /LPF   Sars-CoV-2 PCR   Result Value Ref Range    Coronavirus 2019, PCR Not Detected Not Detected   Influenza A, and B PCR   Result Value Ref Range    Flu A Result Not Detected Not Detected    Flu B Result Not Detected Not Detected     CT angio chest for pulmonary embolism   Final Result   No evidence of pulmonary embolism.        Right upper lobe 3 mm pulmonary nodules. No further follow-up is   required, however, if the patient has high risk factors for primary   lung malignancy, follow-up noncontrast CT scan chest in 12 months may   be obtained. (Sandro Taohoraven et al., Guidelines for management of   incidental pulmonary nodules detected on CT images: From the   Fleischner Society 2017, Radiology. 2017 Jul;284 (1):228-243.)        Atherosclerosis and coronary artery calcifications.        Signed by: Catarino Parra 5/23/2024 5:59 PM   Dictation workstation:   [id number]      US gallbladder   Final Result   1. Fatty infiltration of the liver. This can cause abnormal liver   function tests.   2.        MACRO:   None        Signed by: Kym Verma 5/23/2024 2:59 PM   Dictation  workstation:   [id number]      XR chest 1 view   Final Result   1.  No evidence of acute cardiopulmonary process.             Signed by: Mike Masters 5/23/2024 12:24 PM   Dictation workstation:   [id number]             Assessment/Plan   Principal Problem:    General weakness  Active Problems:    Alcohol abuse    Hypokalemia    Hypomagnesemia    Tachycardia    Unable to ambulate    Failure to thrive in adult    Hypoxia    Elevated troponin    Elevated liver enzymes    COPD exacerbation (Multi)    Hyponatremia    Rash      Plan: PT, OT, social work consults requested  DuoNebs and albuterol as needed  Breo substituted for Advair  Prednisone 40 mg daily  Telemetry,  Lovenox prophylactically  Oxygen as needed  Protonix  Daily labs  LR at 100 cc an hour  CIWA precautions with daily folic acid, thiamine, multivitamin  Repeat troponin and BMP pending, repeat magnesium in the morning  Med reconciliation has not been completed and medications will need to be reviewed and added if needed.  Findings, orders, plan discussed with Dr. Berto Patel PA-C

## 2024-05-23 NOTE — ED PROVIDER NOTES
HPI   No chief complaint on file.      HPI  The patient is a 73 year old Female with a significant PMH of PVD, Hep B, COPD, Pancreatitis, HTN, HLD, ETOH abuse and Tobacco use who presents emergency department chief complaint of generalized weakness.  Patient apparently found down at home covered in her own feces and she states that she has felt weak for 4 days.  Both of her legs feel weak is on just 1 side.  She has not had any facial droop, word finding difficulty or difficulty swallowing/speaking.  She otherwise feels okay without any back pain, urinary symptoms, fevers, chest pain, shortness of breath, abdominal pain, nausea or vomiting.  Has a cough however this is baseline for her and it is no worse than it typically is.  No falls or trauma sustained.    PMH:as above.  Meds:reviewed in EMR.  PSH:reviewed in EMR.  allergies:reviewed in EMR.  social: Alcohol and tobacco use disorder.  Family History: non-contributory to acute presentation.    A full 10 point Review of Systems was reviewed with the patient and is negative unless stated in the HPI.                  No data recorded                   Patient History   No past medical history on file.  Past Surgical History:   Procedure Laterality Date    MR HEAD ANGIO WO IV CONTRAST  8/19/2019    MR HEAD ANGIO WO IV CONTRAST 8/19/2019 [facility]ICAL [facility]GAC[facility]    MR NECK ANGIO WO IV CONTRAST  8/19/2019    MR NECK ANGIO WO IV CONTRAST 8/19/2019 [facility] CLINICAL LEGACY    MR NECK ANGIO WO IV CONTRAST  11/30/2022    MR NECK ANGIO WO IV CONTRAST 11/30/2022 [facility]    OTHER SURGICAL HISTORY  02/15/2016    Wrist Surgery     No family history on file.  Social History     Tobacco Use    Smoking status: Every Day     Types: Cigarettes    Smokeless tobacco: Never   Substance Use Topics    Alcohol use: Not Currently    Drug use: Never   EKG showed sinus tachycardia rate of 109 beats minute.  Normal axis.  No heart block or QTc interval prolongation.  There are occasional  PVCs noted on the rhythm strip.  Mild ST depressions inferiorly that are not new.  Largely unchanged EKG when compared to priors.    Physical Exam   ED Triage Vitals   Temp Pulse Resp BP   -- -- -- --      SpO2 Temp src Heart Rate Source Patient Position   -- -- -- --      BP Location FiO2 (%)     -- --       Physical Exam    Physical Exam:    Appearance: Cachectic and chronically ill-appearing.    Skin: Intact,  dry skin, no lesions, rash, petechiae or purpura.     Eyes: PERRLA, EOMs intact,  No scleral injection. No scleral icterus.     ENT: Hearing grossly intact. External auditory canals patent. Nares patent, mucus membranes moist. Dentition without lesions.     Neck: Supple, without meningismus. Trachea at midline.     Pulmonary: significantly diminished air movement to the bilateral lung fields.  No crackles, rhonchi or wheezing.  No increased work of breathing.  No stridor.    Cardiac: Normal S1, S2 without murmur, rub, gallop or extrasystole. No JVD, Carotids without bruits.    Abdomen: Soft, nontender.  No palpable organomegaly.  No rebound or guarding.      Genitourinary: Exam deferred.    Musculoskeletal:  no edema, or deformity.  No cyanosis or clubbing.  The bilateral PT and DP pulses are not palpable however both are biphasic on Doppler.    Neurological: Alert and oriented x 3, GCS 15.  Cranial nerves II through XII are grossly intact.  Strength is 4 out of 5 and symmetric in bilateral upper and lower extremities.  No sensation abnormalities to light touch distally in all 4 extremities.    Psychiatric: Appropriate mood and affect.     ED Course & MDM   Diagnoses as of 05/23/24 1754   Generalized weakness   Failure to thrive in adult   COPD exacerbation (Multi)   Hypokalemia   Hypomagnesemia     EKG shows sinus tachycardia at a rate of 109 bpm.  Normal axis.  No heart block or QTc interval prolongation.  There are PVCs noted on the rhythm strip.  No ST segment or T wave changes concerning for ischemia.   Apart from rate, largely unchanged EKG from prior done September/14/2023.  Medical Decision Making  The patient 73-year-old female with past medical history of peripheral vascular disease, hepatitis B, COPD, pancreatitis, hypertension, hyperlipidemia and alcohol/tobacco use disorder presents emergency department with generalized weakness.  She was tachycardic and hypoxic on arrival to 87 to 88%.  Otherwise hemodynamically stable.  Afebrile.  Had generalized weakness that was symmetric.  No focal findings or unilateral weakness to suggest CVA.  EKG was performed was nonischemic (see above for formal interpretation) basic labs markable for hypokalemia and hypomagnesium Loree which were replenished.  Troponin initially slightly elevated at 15 however down trended 12 and likely secondary to demand.  Low suspicion for acute coronary syndrome.  Urine was without signs of urinary tract infection.  She did have significantly elevated bilirubin, AST and alk phos compared to prior CMP's and so we did order right upper quadrant ultrasound to ensure that she did not have cholelithiasis or choledocholithiasis among other things.  It showed fatty liver.  CT angio of the chest was ordered due to elevation in D-dimer and tachycardia and hypoxemia.  This was still pending at time my signout.  Most likely, we felt that the patient was in the midst of a moderate COPD exacerbation as she was severely diminished.  We treated her accordingly COPD admitted her for failure to thrive and electrolyte derangements.  She was in stable condition at the time of her admission.  Placed on CIWA due to her history of alcohol use disorder and active drinking.      This patient was discussed with Dr. Nieto who agrees.      Bob Waite MD  Emergency Medicine, PGY3    Procedure  Procedures     Richmond Waite MD  Resident  05/25/24 0590

## 2024-05-24 LAB
ALBUMIN SERPL BCP-MCNC: 3.2 G/DL (ref 3.4–5)
ALP SERPL-CCNC: 344 U/L (ref 33–136)
ALT SERPL W P-5'-P-CCNC: 35 U/L (ref 7–45)
ANION GAP SERPL CALC-SCNC: 16 MMOL/L (ref 10–20)
AST SERPL W P-5'-P-CCNC: 82 U/L (ref 9–39)
BILIRUB SERPL-MCNC: 3.5 MG/DL (ref 0–1.2)
BUN SERPL-MCNC: 12 MG/DL (ref 6–23)
CALCIUM SERPL-MCNC: 8.7 MG/DL (ref 8.6–10.3)
CHLORIDE SERPL-SCNC: 98 MMOL/L (ref 98–107)
CO2 SERPL-SCNC: 27 MMOL/L (ref 21–32)
CREAT SERPL-MCNC: 0.52 MG/DL (ref 0.5–1.05)
EGFRCR SERPLBLD CKD-EPI 2021: >90 ML/MIN/1.73M*2
ERYTHROCYTE [DISTWIDTH] IN BLOOD BY AUTOMATED COUNT: 12.5 % (ref 11.5–14.5)
EST. AVERAGE GLUCOSE BLD GHB EST-MCNC: 71 MG/DL
FERRITIN SERPL-MCNC: 3541 NG/ML (ref 8–150)
GLUCOSE SERPL-MCNC: 119 MG/DL (ref 74–99)
HBA1C MFR BLD: 4.1 %
HCT VFR BLD AUTO: 28 % (ref 36–46)
HGB BLD-MCNC: 10.5 G/DL (ref 12–16)
INR PPP: 1 (ref 0.9–1.1)
IRON SATN MFR SERPL: ABNORMAL %
IRON SERPL-MCNC: 110 UG/DL (ref 35–150)
MAGNESIUM SERPL-MCNC: 1.4 MG/DL (ref 1.6–2.4)
MCH RBC QN AUTO: 35.7 PG (ref 26–34)
MCHC RBC AUTO-ENTMCNC: 37.5 G/DL (ref 32–36)
MCV RBC AUTO: 95 FL (ref 80–100)
NRBC BLD-RTO: 0 /100 WBCS (ref 0–0)
PLATELET # BLD AUTO: 62 X10*3/UL (ref 150–450)
POTASSIUM SERPL-SCNC: 2.6 MMOL/L (ref 3.5–5.3)
PROT SERPL-MCNC: 5.1 G/DL (ref 6.4–8.2)
PROTHROMBIN TIME: 11.2 SECONDS (ref 9.8–12.8)
RBC # BLD AUTO: 2.94 X10*6/UL (ref 4–5.2)
SODIUM SERPL-SCNC: 138 MMOL/L (ref 136–145)
T4 FREE SERPL-MCNC: 1.12 NG/DL (ref 0.61–1.12)
TIBC SERPL-MCNC: ABNORMAL UG/DL
TSH SERPL-ACNC: 3.38 MIU/L (ref 0.44–3.98)
UIBC SERPL-MCNC: <55 UG/DL (ref 110–370)
WBC # BLD AUTO: 5.1 X10*3/UL (ref 4.4–11.3)

## 2024-05-24 PROCEDURE — 86038 ANTINUCLEAR ANTIBODIES: CPT | Mod: AHULAB | Performed by: NURSE PRACTITIONER

## 2024-05-24 PROCEDURE — 36415 COLL VENOUS BLD VENIPUNCTURE: CPT | Performed by: NURSE PRACTITIONER

## 2024-05-24 PROCEDURE — 82728 ASSAY OF FERRITIN: CPT | Mod: AHULAB | Performed by: NURSE PRACTITIONER

## 2024-05-24 PROCEDURE — 85610 PROTHROMBIN TIME: CPT | Performed by: NURSE PRACTITIONER

## 2024-05-24 PROCEDURE — 2500000004 HC RX 250 GENERAL PHARMACY W/ HCPCS (ALT 636 FOR OP/ED): Performed by: PHYSICIAN ASSISTANT

## 2024-05-24 PROCEDURE — 83540 ASSAY OF IRON: CPT | Performed by: INTERNAL MEDICINE

## 2024-05-24 PROCEDURE — 2500000002 HC RX 250 W HCPCS SELF ADMINISTERED DRUGS (ALT 637 FOR MEDICARE OP, ALT 636 FOR OP/ED): Performed by: GENERAL PRACTICE

## 2024-05-24 PROCEDURE — 99233 SBSQ HOSP IP/OBS HIGH 50: CPT | Performed by: INTERNAL MEDICINE

## 2024-05-24 PROCEDURE — 1200000002 HC GENERAL ROOM WITH TELEMETRY DAILY

## 2024-05-24 PROCEDURE — 80053 COMPREHEN METABOLIC PANEL: CPT | Performed by: PHYSICIAN ASSISTANT

## 2024-05-24 PROCEDURE — 94640 AIRWAY INHALATION TREATMENT: CPT

## 2024-05-24 PROCEDURE — 2500000004 HC RX 250 GENERAL PHARMACY W/ HCPCS (ALT 636 FOR OP/ED): Performed by: INTERNAL MEDICINE

## 2024-05-24 PROCEDURE — 82390 ASSAY OF CERULOPLASMIN: CPT | Mod: AHULAB | Performed by: NURSE PRACTITIONER

## 2024-05-24 PROCEDURE — 36415 COLL VENOUS BLD VENIPUNCTURE: CPT | Performed by: PHYSICIAN ASSISTANT

## 2024-05-24 PROCEDURE — 84443 ASSAY THYROID STIM HORMONE: CPT | Performed by: INTERNAL MEDICINE

## 2024-05-24 PROCEDURE — 83036 HEMOGLOBIN GLYCOSYLATED A1C: CPT | Mod: AHULAB | Performed by: INTERNAL MEDICINE

## 2024-05-24 PROCEDURE — 2500000001 HC RX 250 WO HCPCS SELF ADMINISTERED DRUGS (ALT 637 FOR MEDICARE OP): Performed by: PHYSICIAN ASSISTANT

## 2024-05-24 PROCEDURE — 86015 ACTIN ANTIBODY EACH: CPT | Mod: AHULAB | Performed by: NURSE PRACTITIONER

## 2024-05-24 PROCEDURE — 85027 COMPLETE CBC AUTOMATED: CPT | Performed by: PHYSICIAN ASSISTANT

## 2024-05-24 PROCEDURE — 2500000002 HC RX 250 W HCPCS SELF ADMINISTERED DRUGS (ALT 637 FOR MEDICARE OP, ALT 636 FOR OP/ED): Mod: MUE | Performed by: INTERNAL MEDICINE

## 2024-05-24 PROCEDURE — 86235 NUCLEAR ANTIGEN ANTIBODY: CPT | Performed by: NURSE PRACTITIONER

## 2024-05-24 PROCEDURE — 84439 ASSAY OF FREE THYROXINE: CPT | Performed by: INTERNAL MEDICINE

## 2024-05-24 PROCEDURE — 86381 MITOCHONDRIAL ANTIBODY EACH: CPT | Mod: AHULAB | Performed by: NURSE PRACTITIONER

## 2024-05-24 PROCEDURE — 2500000002 HC RX 250 W HCPCS SELF ADMINISTERED DRUGS (ALT 637 FOR MEDICARE OP, ALT 636 FOR OP/ED): Mod: MUE | Performed by: PEDIATRICS

## 2024-05-24 PROCEDURE — 83735 ASSAY OF MAGNESIUM: CPT | Performed by: PHYSICIAN ASSISTANT

## 2024-05-24 PROCEDURE — 94664 DEMO&/EVAL PT USE INHALER: CPT

## 2024-05-24 PROCEDURE — 99221 1ST HOSP IP/OBS SF/LOW 40: CPT | Performed by: NURSE PRACTITIONER

## 2024-05-24 RX ORDER — FLUTICASONE FUROATE AND VILANTEROL 200; 25 UG/1; UG/1
1 POWDER RESPIRATORY (INHALATION)
Status: DISCONTINUED | OUTPATIENT
Start: 2024-05-24 | End: 2024-05-24

## 2024-05-24 RX ORDER — ENOXAPARIN SODIUM 100 MG/ML
40 INJECTION SUBCUTANEOUS EVERY 24 HOURS
Status: DISCONTINUED | OUTPATIENT
Start: 2024-05-24 | End: 2024-05-28 | Stop reason: HOSPADM

## 2024-05-24 RX ORDER — PANTOPRAZOLE SODIUM 40 MG/1
40 TABLET, DELAYED RELEASE ORAL DAILY
Status: DISCONTINUED | OUTPATIENT
Start: 2024-05-24 | End: 2024-05-24

## 2024-05-24 RX ORDER — ACETAMINOPHEN 160 MG/5ML
650 SOLUTION ORAL EVERY 4 HOURS PRN
Status: DISCONTINUED | OUTPATIENT
Start: 2024-05-24 | End: 2024-05-28 | Stop reason: HOSPADM

## 2024-05-24 RX ORDER — FORMOTEROL FUMARATE DIHYDRATE 20 UG/2ML
20 SOLUTION RESPIRATORY (INHALATION)
Status: DISCONTINUED | OUTPATIENT
Start: 2024-05-24 | End: 2024-05-28 | Stop reason: HOSPADM

## 2024-05-24 RX ORDER — POTASSIUM CHLORIDE 14.9 MG/ML
20 INJECTION INTRAVENOUS
Status: COMPLETED | OUTPATIENT
Start: 2024-05-24 | End: 2024-05-24

## 2024-05-24 RX ORDER — MAGNESIUM SULFATE HEPTAHYDRATE 40 MG/ML
2 INJECTION, SOLUTION INTRAVENOUS ONCE
Status: COMPLETED | OUTPATIENT
Start: 2024-05-24 | End: 2024-05-24

## 2024-05-24 RX ORDER — BUDESONIDE 0.5 MG/2ML
0.5 INHALANT ORAL
Status: DISCONTINUED | OUTPATIENT
Start: 2024-05-24 | End: 2024-05-28 | Stop reason: HOSPADM

## 2024-05-24 RX ORDER — TALC
3 POWDER (GRAM) TOPICAL NIGHTLY PRN
Status: DISCONTINUED | OUTPATIENT
Start: 2024-05-24 | End: 2024-05-28 | Stop reason: HOSPADM

## 2024-05-24 RX ORDER — POTASSIUM CHLORIDE 20 MEQ/1
20 TABLET, EXTENDED RELEASE ORAL DAILY
Status: DISCONTINUED | OUTPATIENT
Start: 2024-05-24 | End: 2024-05-28 | Stop reason: HOSPADM

## 2024-05-24 RX ORDER — PANTOPRAZOLE SODIUM 40 MG/10ML
40 INJECTION, POWDER, LYOPHILIZED, FOR SOLUTION INTRAVENOUS
Status: DISCONTINUED | OUTPATIENT
Start: 2024-05-24 | End: 2024-05-28 | Stop reason: HOSPADM

## 2024-05-24 RX ORDER — ONDANSETRON 4 MG/1
4 TABLET, ORALLY DISINTEGRATING ORAL EVERY 8 HOURS PRN
Status: DISCONTINUED | OUTPATIENT
Start: 2024-05-24 | End: 2024-05-28 | Stop reason: HOSPADM

## 2024-05-24 RX ORDER — SODIUM CHLORIDE, SODIUM LACTATE, POTASSIUM CHLORIDE, CALCIUM CHLORIDE 600; 310; 30; 20 MG/100ML; MG/100ML; MG/100ML; MG/100ML
100 INJECTION, SOLUTION INTRAVENOUS CONTINUOUS
Status: DISCONTINUED | OUTPATIENT
Start: 2024-05-24 | End: 2024-05-28 | Stop reason: HOSPADM

## 2024-05-24 RX ORDER — PANTOPRAZOLE SODIUM 40 MG/1
40 TABLET, DELAYED RELEASE ORAL
Status: DISCONTINUED | OUTPATIENT
Start: 2024-05-24 | End: 2024-05-28 | Stop reason: HOSPADM

## 2024-05-24 RX ORDER — ONDANSETRON HYDROCHLORIDE 2 MG/ML
4 INJECTION, SOLUTION INTRAVENOUS EVERY 8 HOURS PRN
Status: DISCONTINUED | OUTPATIENT
Start: 2024-05-24 | End: 2024-05-28 | Stop reason: HOSPADM

## 2024-05-24 RX ORDER — ACETAMINOPHEN 650 MG/1
650 SUPPOSITORY RECTAL EVERY 4 HOURS PRN
Status: DISCONTINUED | OUTPATIENT
Start: 2024-05-24 | End: 2024-05-28 | Stop reason: HOSPADM

## 2024-05-24 RX ORDER — ACETAMINOPHEN 325 MG/1
650 TABLET ORAL EVERY 4 HOURS PRN
Status: DISCONTINUED | OUTPATIENT
Start: 2024-05-24 | End: 2024-05-28 | Stop reason: HOSPADM

## 2024-05-24 RX ADMIN — THIAMINE HYDROCHLORIDE 100 MG: 100 INJECTION, SOLUTION INTRAMUSCULAR; INTRAVENOUS at 09:35

## 2024-05-24 RX ADMIN — POTASSIUM CHLORIDE 20 MEQ: 200 INJECTION, SOLUTION INTRAVENOUS at 14:22

## 2024-05-24 RX ADMIN — PANTOPRAZOLE SODIUM 40 MG: 40 TABLET, DELAYED RELEASE ORAL at 07:00

## 2024-05-24 RX ADMIN — IPRATROPIUM BROMIDE AND ALBUTEROL SULFATE 3 ML: 2.5; .5 SOLUTION RESPIRATORY (INHALATION) at 19:12

## 2024-05-24 RX ADMIN — Medication 1 TABLET: at 08:59

## 2024-05-24 RX ADMIN — BUDESONIDE INHALATION 0.5 MG: 0.5 SUSPENSION RESPIRATORY (INHALATION) at 19:12

## 2024-05-24 RX ADMIN — SODIUM CHLORIDE, POTASSIUM CHLORIDE, SODIUM LACTATE AND CALCIUM CHLORIDE 100 ML/HR: 600; 310; 30; 20 INJECTION, SOLUTION INTRAVENOUS at 02:39

## 2024-05-24 RX ADMIN — FOLIC ACID 1 MG: 1 TABLET ORAL at 08:59

## 2024-05-24 RX ADMIN — BUDESONIDE INHALATION 0.5 MG: 0.5 SUSPENSION RESPIRATORY (INHALATION) at 07:51

## 2024-05-24 RX ADMIN — POTASSIUM CHLORIDE 20 MEQ: 200 INJECTION, SOLUTION INTRAVENOUS at 11:51

## 2024-05-24 RX ADMIN — POTASSIUM CHLORIDE 20 MEQ: 1500 TABLET, EXTENDED RELEASE ORAL at 13:06

## 2024-05-24 RX ADMIN — FORMOTEROL FUMARATE DIHYDRATE 20 MCG: 20 SOLUTION RESPIRATORY (INHALATION) at 19:12

## 2024-05-24 RX ADMIN — FORMOTEROL FUMARATE DIHYDRATE 20 MCG: 20 SOLUTION RESPIRATORY (INHALATION) at 07:51

## 2024-05-24 RX ADMIN — MAGNESIUM SULFATE HEPTAHYDRATE 2 G: 40 INJECTION, SOLUTION INTRAVENOUS at 11:51

## 2024-05-24 RX ADMIN — PREDNISONE 40 MG: 20 TABLET ORAL at 08:59

## 2024-05-24 RX ADMIN — IPRATROPIUM BROMIDE AND ALBUTEROL SULFATE 3 ML: 2.5; .5 SOLUTION RESPIRATORY (INHALATION) at 07:51

## 2024-05-24 SDOH — SOCIAL STABILITY: SOCIAL INSECURITY: ARE THERE ANY APPARENT SIGNS OF INJURIES/BEHAVIORS THAT COULD BE RELATED TO ABUSE/NEGLECT?: NO

## 2024-05-24 SDOH — SOCIAL STABILITY: SOCIAL INSECURITY: DO YOU FEEL UNSAFE GOING BACK TO THE PLACE WHERE YOU ARE LIVING?: NO

## 2024-05-24 SDOH — SOCIAL STABILITY: SOCIAL INSECURITY: HAVE YOU HAD THOUGHTS OF HARMING ANYONE ELSE?: NO

## 2024-05-24 SDOH — SOCIAL STABILITY: SOCIAL INSECURITY: ABUSE: ADULT

## 2024-05-24 SDOH — SOCIAL STABILITY: SOCIAL INSECURITY: DO YOU FEEL ANYONE HAS EXPLOITED OR TAKEN ADVANTAGE OF YOU FINANCIALLY OR OF YOUR PERSONAL PROPERTY?: NO

## 2024-05-24 SDOH — SOCIAL STABILITY: SOCIAL INSECURITY: HAVE YOU HAD ANY THOUGHTS OF HARMING ANYONE ELSE?: NO

## 2024-05-24 SDOH — SOCIAL STABILITY: SOCIAL INSECURITY: ARE YOU OR HAVE YOU BEEN THREATENED OR ABUSED PHYSICALLY, EMOTIONALLY, OR SEXUALLY BY ANYONE?: NO

## 2024-05-24 SDOH — SOCIAL STABILITY: SOCIAL INSECURITY: HAS ANYONE EVER THREATENED TO HURT YOUR FAMILY OR YOUR PETS?: NO

## 2024-05-24 SDOH — SOCIAL STABILITY: SOCIAL INSECURITY: DOES ANYONE TRY TO KEEP YOU FROM HAVING/CONTACTING OTHER FRIENDS OR DOING THINGS OUTSIDE YOUR HOME?: NO

## 2024-05-24 ASSESSMENT — LIFESTYLE VARIABLES
HAVE YOU OR SOMEONE ELSE BEEN INJURED AS A RESULT OF YOUR DRINKING: NO
HOW OFTEN DURING THE LAST YEAR HAVE YOU FOUND THAT YOU WERE NOT ABLE TO STOP DRINKING ONCE YOU HAD STARTED: NEVER
HOW OFTEN DURING THE LAST YEAR HAVE YOU BEEN UNABLE TO REMEMBER WHAT HAPPENED THE NIGHT BEFORE BECAUSE YOU HAD BEEN DRINKING: NEVER
HOW MANY STANDARD DRINKS CONTAINING ALCOHOL DO YOU HAVE ON A TYPICAL DAY: 1 OR 2
AUDIT TOTAL SCORE: 0
HOW OFTEN DURING THE LAST YEAR HAVE YOU HAD A FEELING OF GUILT OR REMORSE AFTER DRINKING: NEVER
PRESCIPTION_ABUSE_PAST_12_MONTHS: NO
HOW OFTEN DURING THE LAST YEAR HAVE YOU NEEDED AN ALCOHOLIC DRINK FIRST THING IN THE MORNING TO GET YOURSELF GOING AFTER A NIGHT OF HEAVY DRINKING: NEVER
AUDIT TOTAL SCORE: 6
SKIP TO QUESTIONS 9-10: 0
HOW OFTEN DO YOU HAVE 6 OR MORE DRINKS ON ONE OCCASION: WEEKLY
AUDIT-C TOTAL SCORE: 6
SUBSTANCE_ABUSE_PAST_12_MONTHS: NO
AUDIT-C TOTAL SCORE: 6
HAS A RELATIVE, FRIEND, DOCTOR, OR ANOTHER HEALTH PROFESSIONAL EXPRESSED CONCERN ABOUT YOUR DRINKING OR SUGGESTED YOU CUT DOWN: NO
HOW OFTEN DO YOU HAVE A DRINK CONTAINING ALCOHOL: 2-3 TIMES A WEEK
HOW OFTEN DURING THE LAST YEAR HAVE YOU FAILED TO DO WHAT WAS NORMALLY EXPECTED FROM YOU BECAUSE OF DRINKING: NEVER

## 2024-05-24 ASSESSMENT — COGNITIVE AND FUNCTIONAL STATUS - GENERAL
STANDING UP FROM CHAIR USING ARMS: A LOT
MOBILITY SCORE: 18
DAILY ACTIVITIY SCORE: 17
DRESSING REGULAR LOWER BODY CLOTHING: A LITTLE
PERSONAL GROOMING: A LOT
WALKING IN HOSPITAL ROOM: A LOT
HELP NEEDED FOR BATHING: A LOT
TOILETING: A LITTLE
CLIMB 3 TO 5 STEPS WITH RAILING: A LOT
DRESSING REGULAR UPPER BODY CLOTHING: A LITTLE

## 2024-05-24 ASSESSMENT — PATIENT HEALTH QUESTIONNAIRE - PHQ9
2. FEELING DOWN, DEPRESSED OR HOPELESS: NOT AT ALL
1. LITTLE INTEREST OR PLEASURE IN DOING THINGS: NOT AT ALL
SUM OF ALL RESPONSES TO PHQ9 QUESTIONS 1 & 2: 0

## 2024-05-24 ASSESSMENT — PAIN SCALES - GENERAL
PAINLEVEL_OUTOF10: 0 - NO PAIN
PAINLEVEL_OUTOF10: 0 - NO PAIN

## 2024-05-24 ASSESSMENT — COLUMBIA-SUICIDE SEVERITY RATING SCALE - C-SSRS
6. HAVE YOU EVER DONE ANYTHING, STARTED TO DO ANYTHING, OR PREPARED TO DO ANYTHING TO END YOUR LIFE?: NO
1. IN THE PAST MONTH, HAVE YOU WISHED YOU WERE DEAD OR WISHED YOU COULD GO TO SLEEP AND NOT WAKE UP?: NO
2. HAVE YOU ACTUALLY HAD ANY THOUGHTS OF KILLING YOURSELF?: NO

## 2024-05-24 ASSESSMENT — PAIN - FUNCTIONAL ASSESSMENT
PAIN_FUNCTIONAL_ASSESSMENT: 0-10
PAIN_FUNCTIONAL_ASSESSMENT: 0-10

## 2024-05-24 ASSESSMENT — ACTIVITIES OF DAILY LIVING (ADL): LACK_OF_TRANSPORTATION: NO

## 2024-05-24 NOTE — CONSULTS
Reason For Consult  Possible cirrhosis, chronic alcohol use, thrombocytopenia    History Of Present Illness  Svitlana Perry is a 74 y.o. female with history of PVD, hepatitis B, hypertension, hyperlipidemia, rheumatoid arthritis, leg edema, neuropathy, COPD pancreatitis, chronic alcohol use, presented with complaints of weakness, not able to ambulate, progressive weakness.  Stated his she has been weak for the past 3 to 5 days.  On admission she found with hypokalemia 2.6, BUN 12, creatinine 1.52, abnormal liver enzymes-alk phos 344, ALT 35, AST 82, total bili 3.5, H&H 10 and 30.  Patient appears awake, oriented.  She denies abdominal pain, nausea, vomiting, abdominal distention or leg swelling.  She reports her bowels are always loose, no melena or hematochezia.  She stated her appetite is very low for the past 3 to 5 days, denies indigestion, acid reflux, dysphagia or odynophagia.  She drinks 1 pint of vodka mixed with ginger ale daily, smokes 1 pack a day, denies illicit drug use.  Denies excessive Tylenol use, new medications, blood transfusion, IV drug use.  She has history of alcoholic hepatitis and pancreatitis, was admitted in 2019 and 2020.    EGD 9/7/2020 for possible upper GI bleeding  - Normal esophagus.                         - Normal stomach.                         - Mucosal variant in the duodenum.                         - No specimens collected.    Colonoscopy 2018: One 3 mm polyp (no significant pathologic findings) in the descending colon, removed with a cold biopsy forceps. Four 2 to 4 mm polyps (multiple fragments of hyperplastic polyp) in the sigmoid colon, removed with a cold biopsy     Past Medical History  Per HPI    Surgical History  She has a past surgical history that includes Other surgical history (02/15/2016); MR angio neck wo IV contrast (08/19/2019); MR angio head wo IV contrast (08/19/2019); MR angio neck wo IV contrast (11/30/2022); and Appendectomy.     Social History  She  "reports that she has been smoking cigarettes. She has never used smokeless tobacco. She reports that she does not currently use alcohol. She reports that she does not use drugs.    Family History  Denies any known GI malignancies, 1 brother with history of alcohol abuse, diet of liver cirrhosis     Allergies  Patient has no known allergies.    Review of Systems  10 systms reviewed and negative other than HPI     Physical Exam  Physical Exam  Constitutional:       Appearance: Normal appearance. She is underweight.   HENT:      Head: Normocephalic and atraumatic.      Nose: Nose normal.      Mouth/Throat:      Mouth: Mucous membranes are moist.      Pharynx: Oropharynx is clear.   Eyes:      Conjunctiva/sclera: Conjunctivae normal.   Cardiovascular:      Rate and Rhythm: Regular rhythm. Tachycardia present.      Pulses: Normal pulses.      Heart sounds: Normal heart sounds.   Pulmonary:      Effort: Pulmonary effort is normal.      Breath sounds: Normal breath sounds.   Abdominal:      General: Bowel sounds are normal. There is no distension.      Palpations: Abdomen is soft. There is no mass.      Tenderness: There is no abdominal tenderness. There is no guarding or rebound.      Hernia: No hernia is present.   Musculoskeletal:      Cervical back: Neck supple.   Skin:     General: Skin is warm and dry.   Neurological:      General: No focal deficit present.      Mental Status: She is alert and oriented to person, place, and time.   Psychiatric:         Mood and Affect: Mood normal.         Behavior: Behavior normal.            Last Recorded Vitals  Blood pressure 102/63, pulse (!) 111, temperature 37.6 °C (99.7 °F), resp. rate 18, height 1.651 m (5' 5\"), weight 58.5 kg (128 lb 15.5 oz), SpO2 97%.    Relevant Results      Scheduled medications  budesonide, 0.5 mg, nebulization, BID  [Held by provider] enoxaparin, 40 mg, subcutaneous, q24h  folic acid, 1 mg, oral, Daily  formoterol, 20 mcg, nebulization, " BID  ipratropium-albuteroL, 3 mL, nebulization, TID  multivitamin with minerals, 1 tablet, oral, Daily  pantoprazole, 40 mg, oral, Daily before breakfast   Or  pantoprazole, 40 mg, intravenous, Daily before breakfast  potassium chloride, 20 mEq, intravenous, q2h  potassium chloride CR, 20 mEq, oral, Daily  [START ON 5/26/2024] thiamine, 100 mg, oral, Daily  thiamine, 100 mg, intravenous, Daily      Continuous medications  lactated Ringer's, 100 mL/hr, Last Rate: 100 mL/hr (05/24/24 0239)      PRN medications  PRN medications: acetaminophen **OR** acetaminophen **OR** acetaminophen, albuterol, melatonin, ondansetron ODT **OR** ondansetron, oxygen  CT angio chest for pulmonary embolism    Result Date: 5/23/2024  Interpreted By:  Catarino Parra, STUDY: CT ANGIO CHEST FOR PULMONARY EMBOLISM;  5/23/2024 5:28 pm   INDICATION: Signs/Symptoms:sob, tachy.   COMPARISON: 09/14/2023   ACCESSION NUMBER(S): [id number]   ORDERING CLINICIAN: NIKOS SPENCER   TECHNIQUE: Helical data acquisition of the chest was obtained with IV contrast material. 75 mL of Omnipaque 350. MIP reconstructions. Images were reformatted in axial, coronal, and sagittal planes.   FINDINGS: Lungs and Pleura: Few small 3 mm right upper lobe pulmonary nodules. Calcified granuloma. No pulmonary consolidation. No pleural effusion. No pneumothorax.   Mediastinum and axilla: No evidence of pulmonary embolism. No adenopathy by CT size criteria. No cardiomegaly or pericardial effusion. No thoracic aortic aneurysm. Atherosclerosis. Advanced coronary artery calcifications. Small amount of mucus in the trachea.   Visualized Upper Abdomen: Diffuse hepatic hypodensity suggestive of hepatic steatosis. Calcified granuloma in the spleen.   MSK/Chest Wall: No aggressive bony lesion identified. Scattered small marginal osteophytes in the spine.       No evidence of pulmonary embolism.   Right upper lobe 3 mm pulmonary nodules. No further follow-up is required, however,  if the patient has high risk factors for primary lung malignancy, follow-up noncontrast CT scan chest in 12 months may be obtained. (Sandro Bass et al., Guidelines for management of incidental pulmonary nodules detected on CT images: From the Fleischner Society 2017, Radiology. 2017 Jul;284 (1):228-243.)   Atherosclerosis and coronary artery calcifications.   Signed by: Catarino Parra 5/23/2024 5:59 PM Dictation workstation:   [id number]    ECG 12 lead    Result Date: 5/23/2024  Sinus tachycardia with occasional Premature ventricular complexes Nonspecific ST and T wave abnormality Abnormal ECG When compared with ECG of 14-SEP-2023 18:33, Previous ECG has undetermined rhythm, needs review Nonspecific T wave abnormality, worse in Inferior leads T wave inversion now evident in Anterior leads    US gallbladder    Result Date: 5/23/2024  Interpreted By:  Kym Verma, STUDY: US GALLBLADDER;  5/23/2024 2:53 pm   INDICATION: Signs/Symptoms:Significantly elevated bilirubin and alk phos/transaminitis compared to priors..   COMPARISON: None.   ACCESSION NUMBER(S): [id number]   ORDERING CLINICIAN: NIKOS SPENCER   TECHNIQUE: Multiple images of the abdomen were obtained.   FINDINGS: LIVER: The echogenicity of the liver is increased in coarsened consistent with fatty infiltration. There is no hepatic mass. The sagittal dimension of the right lobe of the liver is 18.3 cm, nonenlarged   GALLBLADDER: The gallbladder is nondistended. The gallbladder wall is not thickened. There are no gallstones. There is no sludge. There is no pericholecystic fluid.     BILE DUCTS: There is no intrahepatic biliary dilatation. The common bile duct is  nondilated measuring 0.4 cm.   PANCREAS: Completely obscured secondary to shadowing from bowel gas   RIGHT KIDNEY: The right kidney is  normal in size measuring 10.6 cm in length.   The echogenicity of the cortex is within normal limits. There is no renal mass. There is no intrarenal  calculus or hydronephrosis.       1. Fatty infiltration of the liver. This can cause abnormal liver function tests. 2.   MACRO: None   Signed by: Kym Verma 5/23/2024 2:59 PM Dictation workstation:   [id number]    XR chest 1 view    Result Date: 5/23/2024  Interpreted By:  Mike Masters, STUDY: XR CHEST 1 VIEW;  5/23/2024 12:10 pm   INDICATION: Signs/Symptoms:Chest Pain.   COMPARISON: None.   ACCESSION NUMBER(S): [id number]   ORDERING CLINICIAN: NIKOS SPENCER   FINDINGS:     CARDIOMEDIASTINAL SILHOUETTE: Cardiomediastinal silhouette is normal in size and configuration.   LUNGS: No consolidation, pneumothorax, or significant effusion.   ABDOMEN: No remarkable upper abdominal findings.   BONES: No acute osseous changes.       1.  No evidence of acute cardiopulmonary process.     Signed by: Mike Masters 5/23/2024 12:24 PM Dictation workstation:   [id number]        Assessment/Plan     74 y.o. F with h/o PVD, hepatitis B, hypertension, hyperlipidemia, rheumatoid arthritis, leg edema, neuropathy, COPD pancreatitis, chronic alcohol use, presented with complaints of weakness, abnormal liver enzymes, thrombocytopenia, normocytic anemia.  Drinks 1 pint of alcohol on a daily basis everyday smoker.  Possibly acute alcoholic hepatitis, hepatic steatosis, cannot rule out cirrhosis. MELD 11.  No indication for prednisolone treatment    -Labs for chronic liver disease  -Monitor LFTs and coags daily while she is here  -Low-sodium diet  -She would need outpatient follow-up with GI/hepatology  -Patient advised to discontinue any alcohol use.  -[facility] protocol  -Consider referral to alcohol rehab    I spent 35 minutes in the professional and overall care of this patient.      Carmen Dc, GRUPO-CNP

## 2024-05-24 NOTE — PROGRESS NOTES
Pharmacy Medication History Review    Svitlana Perry is a 74 y.o. female admitted for General weakness. Pharmacy reviewed the patient's [id number] medications and allergies for accuracy.    The list below reflectives the updated PTA list. Please review each medication in order reconciliation for additional clarification and justification.  Prior to Admission Medications   Prescriptions Last Dose Informant   albuterol 90 mcg/actuation inhaler Unknown Self   Sig: Inhale 2 puffs every 4 hours if needed.   fluticasone (Flovent) 220 mcg/actuation inhaler Not Taking Self   Sig: INHALE 2 PUFFS 2 TIMES A DAY. RINSE MOUTH WITH WATER AFTER USE TO REDUCE AFTERTASTE AND INCIDENCE OF CANDIDIASIS. DO NOT SWALLOW.   Patient not taking: Reported on 5/23/2024   fluticasone propion-salmeteroL (Advair Diskus) 250-50 mcg/dose diskus inhaler Unknown Self   Sig: Inhale 1 puff 2 times a day. Rinse mouth with water after use to reduce aftertaste and incidence of candidiasis. Do not swallow.   fluticasone-umeclidin-vilanter (Trelegy Ellipta) 100-62.5-25 mcg blister with device  Self   Sig: Inhale 1 puff once daily.   ipratropium-albuteroL (Duo-Neb) 0.5-2.5 mg/3 mL nebulizer solution Unknown Self   Sig: USE 1 UNIT DOSE VIA NEBULIZER THREE TIMES DAILY.      Facility-Administered Medications Last Administration Doses Remaining   fluticasone-umeclidin-vilanter (TRELEGY-ELLIPTA) 100-62.5-25 mcg 100-62.5-25 mcg/puff inhaler 1 puff None recorded             The list below reflectives the updated allergy list. Please review each documented allergy for additional clarification and justification.  Allergies  Reviewed by Keren Patel PA-C on 5/23/2024   No Known Allergies         Below are additional concerns with the patient's PTA list.  Spoke to patient, she says she only on inhaler and needs a refill for trelegy ellipta.    Shania Ely

## 2024-05-24 NOTE — CARE PLAN
The patient's goals for the shift include Patient will remain free from pain this shift    The clinical goals for the shift include Patient will remain safe this shift.      Problem: Pain - Adult  Goal: Verbalizes/displays adequate comfort level or baseline comfort level  5/24/2024 0412 by Marilou Nye RN  Outcome: Progressing  5/24/2024 0225 by Marilou Nye RN  Outcome: Progressing     Problem: Safety - Adult  Goal: Free from fall injury  5/24/2024 0412 by Marilou Nye RN  Outcome: Progressing  5/24/2024 0225 by Marilou Nye RN  Outcome: Progressing     Problem: Discharge Planning  Goal: Discharge to home or other facility with appropriate resources  5/24/2024 0412 by Marilou Nye RN  Outcome: Progressing  5/24/2024 0225 by Marilou Nye RN  Outcome: Progressing     Problem: Chronic Conditions and Co-morbidities  Goal: Patient's chronic conditions and co-morbidity symptoms are monitored and maintained or improved  5/24/2024 0412 by Marilou Nye RN  Outcome: Progressing  5/24/2024 0225 by Marilou Nye RN  Outcome: Progressing     Problem: Chronic Conditions and Co-morbidities  Goal: Patient's chronic conditions and co-morbidity symptoms are monitored and maintained or improved  5/24/2024 0412 by Marilou Nye RN  Outcome: Progressing  5/24/2024 0225 by Marilou Nye RN  Outcome: Progressing     Problem: Fall/Injury  Goal: Not fall by end of shift  5/24/2024 0412 by Marilou Nye RN  Outcome: Progressing  5/24/2024 0225 by Marilou Nye RN  Outcome: Progressing  Goal: Be free from injury by end of the shift  5/24/2024 0412 by Marilou Nye RN  Outcome: Progressing  5/24/2024 0225 by Marilou Nye RN  Outcome: Progressing  Goal: Verbalize understanding of personal risk factors for fall in the hospital  5/24/2024 0412 by Marilou Nye RN  Outcome: Progressing  5/24/2024 0225 by Marilou Nye RN  Outcome: Progressing  Goal: Verbalize understanding of risk factor reduction measures  to prevent injury from fall in the home  5/24/2024 0412 by Marilou Nye RN  Outcome: Progressing  5/24/2024 0225 by Marilou Nye RN  Outcome: Progressing  Goal: Use assistive devices by end of the shift  5/24/2024 0412 by Marilou Nye RN  Outcome: Progressing  5/24/2024 0225 by Marilou Nye RN  Outcome: Progressing  Goal: Pace activities to prevent fatigue by end of the shift  5/24/2024 0412 by Marilou Nye RN  Outcome: Progressing  5/24/2024 0225 by Marilou Nye RN  Outcome: Progressing

## 2024-05-24 NOTE — PROGRESS NOTES
"   05/24/24 7761   Discharge Planning   Assistance Needed ETOH, living conditions, caring for self   Patient expects to be discharged to: home vs SNF     Met with patient at bedside. Patient asked \"why do you all keep bothering me\" and then requested that SW return later on. SW will continue to follow.  "

## 2024-05-24 NOTE — PROGRESS NOTES
05/24/24 1033   Discharge Planning   Patient expects to be discharged to: home vs snf     I met with this patient at her bedside she is alertx3 she stated that she does live at home alone, and uses a walker, she stated her neighbors try to help her when they can, patient does not have many resources or a strong support system, I have reached out to our SW team to discuss SNF facilities and options the patient may have on discharge I will continue to monitor for discharge planning.

## 2024-05-24 NOTE — CARE PLAN
The patient's goals for the shift include Patient will remain free from pain this shift    The clinical goals for the shift include Patient will remain safe this shift.      Problem: Skin  Goal: Decreased wound size/increased tissue granulation at next dressing change  5/24/2024 0412 by Marilou Nye, RN  Outcome: Progressing  5/24/2024 0225 by Marilou Nye, RN  Outcome: Progressing

## 2024-05-24 NOTE — CARE PLAN
The patient's goals for the shift include Patient will remain free from pain this shift    The clinical goals for the shift include Patient will remain safe this shift.    Problem: Pain - Adult  Goal: Verbalizes/displays adequate comfort level or baseline comfort level  Outcome: Progressing     Problem: Safety - Adult  Goal: Free from fall injury  Outcome: Progressing     Problem: Discharge Planning  Goal: Discharge to home or other facility with appropriate resources  Outcome: Progressing     Problem: Chronic Conditions and Co-morbidities  Goal: Patient's chronic conditions and co-morbidity symptoms are monitored and maintained or improved  Outcome: Progressing     Problem: Fall/Injury  Goal: Not fall by end of shift  Outcome: Progressing  Goal: Be free from injury by end of the shift  Outcome: Progressing  Goal: Verbalize understanding of personal risk factors for fall in the hospital  Outcome: Progressing  Goal: Verbalize understanding of risk factor reduction measures to prevent injury from fall in the home  Outcome: Progressing  Goal: Use assistive devices by end of the shift  Outcome: Progressing  Goal: Pace activities to prevent fatigue by end of the shift  Outcome: Progressing     Problem: Skin  Goal: Decreased wound size/increased tissue granulation at next dressing change  Outcome: Progressing  Goal: Participates in plan/prevention/treatment measures  Outcome: Progressing  Goal: Prevent/manage excess moisture  Outcome: Progressing  Goal: Prevent/minimize sheer/friction injuries  Outcome: Progressing  Goal: Promote/optimize nutrition  Outcome: Progressing  Goal: Promote skin healing  Outcome: Progressing

## 2024-05-24 NOTE — NURSING NOTE
Spoke with elina in regards to patient updates d/t bedside nursing currently assisting another patient. Elina grateful for updates on POC and understanding that we are still waiting for GI, PT, OT at this time. No concerns at this time and questions were addressed.

## 2024-05-24 NOTE — PROGRESS NOTES
Svitlana Perry is a 74 y.o. female on day 1 of admission presenting with General weakness.      Subjective   On room air, afebrile, she feels fine, no bronchospasm, no overnight events reported.        Objective     Last Recorded Vitals  /62 (Patient Position: Lying)   Pulse (!) 111   Temp 36.5 °C (97.7 °F) (Oral)   Resp 18   Wt 58.5 kg (128 lb 15.5 oz)   SpO2 98%   Intake/Output last 3 Shifts:    Intake/Output Summary (Last 24 hours) at 5/24/2024 0936  Last data filed at 5/24/2024 0600  Gross per 24 hour   Intake 1460 ml   Output 250 ml   Net 1210 ml       Admission Weight  Weight: 56.7 kg (125 lb) (05/23/24 1923)    Daily Weight  05/23/24 : 58.5 kg (128 lb 15.5 oz)    Image Results  CT angio chest for pulmonary embolism  Narrative: Interpreted By:  Catarino Parra,   STUDY:  CT ANGIO CHEST FOR PULMONARY EMBOLISM;  5/23/2024 5:28 pm      INDICATION:  Signs/Symptoms:sob, tachy.      COMPARISON:  09/14/2023      ACCESSION NUMBER(S):  [id number]      ORDERING CLINICIAN:  NIKOS SPENCER      TECHNIQUE:  Helical data acquisition of the chest was obtained with IV contrast  material. 75 mL of Omnipaque 350. MIP reconstructions. Images were  reformatted in axial, coronal, and sagittal planes.      FINDINGS:  Lungs and Pleura: Few small 3 mm right upper lobe pulmonary nodules.  Calcified granuloma. No pulmonary consolidation. No pleural effusion.  No pneumothorax.      Mediastinum and axilla: No evidence of pulmonary embolism. No  adenopathy by CT size criteria. No cardiomegaly or pericardial  effusion. No thoracic aortic aneurysm. Atherosclerosis. Advanced  coronary artery calcifications. Small amount of mucus in the trachea.      Visualized Upper Abdomen: Diffuse hepatic hypodensity suggestive of  hepatic steatosis. Calcified granuloma in the spleen.      MSK/Chest Wall: No aggressive bony lesion identified. Scattered small  marginal osteophytes in the spine.      Impression: No evidence of pulmonary  embolism.      Right upper lobe 3 mm pulmonary nodules. No further follow-up is  required, however, if the patient has high risk factors for primary  lung malignancy, follow-up noncontrast CT scan chest in 12 months may  be obtained. (Sandro Bass et al., Guidelines for management of  incidental pulmonary nodules detected on CT images: From the  Fleischner Society 2017, Radiology. 2017 Jul;284 (1):228-243.)      Atherosclerosis and coronary artery calcifications.      Signed by: Catarino Parra 5/23/2024 5:59 PM  Dictation workstation:   [id number]  ECG 12 lead  Sinus tachycardia with occasional Premature ventricular complexes  Nonspecific ST and T wave abnormality  Abnormal ECG  When compared with ECG of 14-SEP-2023 18:33,  Previous ECG has undetermined rhythm, needs review  Nonspecific T wave abnormality, worse in Inferior leads  T wave inversion now evident in Anterior leads  US gallbladder  Narrative: Interpreted By:  Kym Verma,   STUDY:  US GALLBLADDER;  5/23/2024 2:53 pm      INDICATION:  Signs/Symptoms:Significantly elevated bilirubin and alk  phos/transaminitis compared to priors..      COMPARISON:  None.      ACCESSION NUMBER(S):  [id number]      ORDERING CLINICIAN:  NIKOS SPENCER      TECHNIQUE:  Multiple images of the abdomen were obtained.      FINDINGS:  LIVER:  The echogenicity of the liver is increased in coarsened consistent  with fatty infiltration. There is no hepatic mass.  The sagittal dimension of the right lobe of the liver is 18.3 cm,  nonenlarged      GALLBLADDER:  The gallbladder is nondistended.  The gallbladder wall is not thickened.  There are no gallstones.  There is no sludge.  There is no pericholecystic fluid.          BILE DUCTS:  There is no intrahepatic biliary dilatation.  The common bile duct is  nondilated measuring 0.4 cm.      PANCREAS:  Completely obscured secondary to shadowing from bowel gas      RIGHT KIDNEY:  The right kidney is  normal in size measuring  10.6 cm in length.      The echogenicity of the cortex is within normal limits.  There is no renal mass.  There is no intrarenal calculus or hydronephrosis.      Impression: 1. Fatty infiltration of the liver. This can cause abnormal liver  function tests.  2.      MACRO:  None      Signed by: Kym Verma 5/23/2024 2:59 PM  Dictation workstation:   [id number]  XR chest 1 view  Narrative: Interpreted By:  Mike Masters,   STUDY:  XR CHEST 1 VIEW;  5/23/2024 12:10 pm      INDICATION:  Signs/Symptoms:Chest Pain.      COMPARISON:  None.      ACCESSION NUMBER(S):  [id number]      ORDERING CLINICIAN:  NIKOS SPENCER      FINDINGS:          CARDIOMEDIASTINAL SILHOUETTE:  Cardiomediastinal silhouette is normal in size and configuration.      LUNGS:  No consolidation, pneumothorax, or significant effusion.      ABDOMEN:  No remarkable upper abdominal findings.      BONES:  No acute osseous changes.      Impression: 1.  No evidence of acute cardiopulmonary process.          Signed by: Mike Masters 5/23/2024 12:24 PM  Dictation workstation:   [id number]      Physical Exam  Constitutional:       Appearance: Normal appearance.   Cardiovascular:      Rate and Rhythm: Normal rate and regular rhythm.   Pulmonary:      Effort: Pulmonary effort is normal.      Breath sounds: Normal breath sounds.   Abdominal:      General: Abdomen is flat. Bowel sounds are normal.      Palpations: Abdomen is soft.   Musculoskeletal:      Cervical back: Normal range of motion.   Skin:     General: Skin is warm.   Neurological:      General: No focal deficit present.      Mental Status: She is alert and oriented to person, place, and time. Mental status is at baseline.   Psychiatric:         Mood and Affect: Mood normal.         Behavior: Behavior normal.         Thought Content: Thought content normal.         Judgment: Judgment normal.         Relevant Results               Assessment/Plan          Principal Problem:    General  weakness  Active Problems:    Alcohol abuse    Hypokalemia    Hypomagnesemia    Tachycardia    Unable to ambulate    Failure to thrive in adult    Hypoxia    Elevated troponin    Elevated liver enzymes    COPD exacerbation (Multi)    Hyponatremia    Rash    Generalized weakness    5/24:  Etohism... says last drink was 6 days ago, does report hx of withdrawals... unless she's lying she's likely beyond withdrawal timeframe but will continue to monitor for now.   HypoK... replace and monitor  HypoMg... replace  Elevated bili, LFTs, thrombocytopenia... from fatty liver + etohism... f/up GI. Counseled for etoh cessation, folate, thiamine.   ? Aecopd... no bronchospasm today, no lung infiltrates, on room air.... stop prednisone for now.   Lung nodules... outpt monitoring, repeat CT 12 months.   Check TFTs, A1c, Fe, hep panel.   Nutrition consult  PT/ot  Dvt px with SCDs... Lovenox on hold for now given low platelets.   Pt lives at home alone. Likely needs snf pending pt/ot. She does not appear demented or confused.          Wilver Thomson MD

## 2024-05-24 NOTE — CONSULTS
Nutrition Assessment Note  Nutrition Assessment      Reason for Assessment  Reason for Assessment: Provider consult order    Chart reviewed and pt visited.    Svitlana is a 74 y.o. female admitted for general weakness  PMH includes: PVD, COPD, pancreatitis, HTN, HLD, EtOH disorder use, hepatitis    Visited with pt, pt states:  -appetite is fine now; last 3 weeks, appetite was poor- intake <25%  -eating 3 meals currently at 50%  -no food allergies  -no difficulties chewing/swallowing  -UBW is 125#, was this wt upon admission  -declines nutritional supplements    Scheduled medications  budesonide, 0.5 mg, nebulization, BID  [Held by provider] enoxaparin, 40 mg, subcutaneous, q24h  folic acid, 1 mg, oral, Daily  formoterol, 20 mcg, nebulization, BID  ipratropium-albuteroL, 3 mL, nebulization, TID  multivitamin with minerals, 1 tablet, oral, Daily  pantoprazole, 40 mg, oral, Daily before breakfast   Or  pantoprazole, 40 mg, intravenous, Daily before breakfast  potassium chloride CR, 20 mEq, oral, Daily  [START ON 5/26/2024] thiamine, 100 mg, oral, Daily  thiamine, 100 mg, intravenous, Daily      Continuous medications  lactated Ringer's, 100 mL/hr, Last Rate: 100 mL/hr (05/24/24 0239)      PRN medications  PRN medications: acetaminophen **OR** acetaminophen **OR** acetaminophen, albuterol, melatonin, ondansetron ODT **OR** ondansetron, oxygen     Latest Reference Range & Units 05/23/24 12:52 05/24/24 07:52   GLUCOSE 74 - 99 mg/dL 68 (L) 119 (H)   SODIUM 136 - 145 mmol/L 132 (L) 138   POTASSIUM 3.5 - 5.3 mmol/L 3.3 (L) 2.6 (LL)   CHLORIDE 98 - 107 mmol/L 94 (L) 98   Bicarbonate 21 - 32 mmol/L 18 (L) 27   Anion Gap 10 - 20 mmol/L 23 (H) 16   Blood Urea Nitrogen 6 - 23 mg/dL 16 12   Creatinine 0.50 - 1.05 mg/dL 0.69 0.52   EGFR >60 mL/min/1.73m*2 >90 >90   Calcium 8.6 - 10.3 mg/dL 8.8 8.7   Albumin 3.4 - 5.0 g/dL 3.8 3.2 (L)   Alkaline Phosphatase 33 - 136 U/L 411 (H) 344 (H)   ALT 7 - 45 U/L 42 35   AST 9 - 39 U/L 106 (H) 82  "(H)   Bilirubin Total 0.0 - 1.2 mg/dL 4.8 (H) 3.5 (H)   Creatine Kinase 0 - 215 U/L 30    Total Protein 6.4 - 8.2 g/dL 6.3 (L) 5.1 (L)   IRON 35 - 150 ug/dL  110   MAGNESIUM 1.60 - 2.40 mg/dL 1.30 (L) 1.40 (L)   HEMOGLOBIN 12.0 - 16.0 g/dL 12.2 10.5 (L)   HEMATOCRIT 36.0 - 46.0 % 33.1 (L) 28.0 (L)   (LL): Data is critically low  (L): Data is abnormally low  (H): Data is abnormally high  !: Data is abnormal  (P): Preliminary  (IP): In Process  Rpt: View report in Results Review for more information    Dietary Orders (From admission, onward)       Start     Ordered    05/24/24 0127  Adult diet Cardiac; 70 gm fat; 2 - 3 grams Sodium  Diet effective now        Question Answer Comment   Diet type Cardiac    Fat restriction: 70 gm fat    Sodium restriction: 2 - 3 grams Sodium        05/24/24 0126                  History:  Food and Nutrient History  Energy Intake: Good > 75 %, Fair 50-75 %, Poor < 50 %  Food and Nutrient History: Pt states she's eating around 50% of hospital meals currently. Prior to admission for 3 weeks, pt was eating <25%; prior to that, normally eats lunch and dinner at 100%  Vitamin/Herbal Supplement Use: pt does not endorse nutritional supplements    Anthropometrics:  Height: 165.1 cm (5' 5\")  Weight: 58.5 kg (128 lb 15.5 oz)  BMI (Calculated): 21.46    Weight Change: 0    Wt Readings from Last 3 Encounters:  05/24/24 58.5 kg (128 lb 15.5 oz)  09/16/23 66.1kg  06/30/23 64 kg (141 lb)  02/21/23 59.9 kg (132 lb)    IBW/kg (Dietitian Calculated): 56.8 kg  Percent of IBW: 103 %     Energy Needs:  Estimated Energy Needs  Total Energy Estimated Needs (kCal): 1750 kCal  Total Estimated Energy Need per Day (kCal/kg): 1950 kCal/kg  Method for Estimating Needs: 30-33kcal/kg    Estimated Protein Needs  Total Protein Estimated Needs (g): 70 g  Total Protein Estimated Needs (g/kg): 80 g/kg  Method for Estimating Needs: 1.2-1.4g/kg    Estimated Fluid Needs  Method for Estimating Needs: 1mL/kcal or MD " recommendations       Nutrition Focused Physical Findings:  Subcutaneous Fat Loss  Orbital Fat Pads: Severe (dark circles, hollowing and loose skin)  Buccal Fat Pads: Mild-Moderate (flat cheeks, minimal bounce)    Muscle Wasting  Temporalis: Severe (hollowed scooping depression)  Pectoralis (Clavicular Region): Severe (protruding prominent clavicle)  Deltoid/Trapezius: Severe (squared shoulders, acromion process prominent)  Interosseous: Severe (depressed area between thumb and forefinger)    Edema  Edema: none       Physical Findings (Nutrition Deficiency/Toxicity)  Skin: Positive (Rash on buttocks)       Nutrition Diagnosis   Malnutrition Diagnosis  Patient has Malnutrition Diagnosis: Yes  Diagnosis Status: New  Malnutrition Diagnosis: Severe malnutrition related to chronic disease or condition  As Evidenced by: prolonged poor intake prior to hospital admit less than 50% of estimated energy needs for greater than 5 days, severe fat loss, severe muscle loss, history of alcohol cirrhosis and hepatitis      Nutrition Interventions/Recommendations      Food and/or Nutrient Delivery Interventions  Meals and Snacks: Fat-modified diet, Mineral-modified diet, General healthful diet     Pt declines nutritional supplements- RD may offer again should po intake not increase.       Nutrition Monitoring and Evaluation   Anthropometrics: Body Composition/Growth/Weight History  Weight: Measured weight, Weight change    Weight Change: Weight gain, Weight change percentage, Weight loss    Body Mass: Body mass index (BMI)       Biochemical Data, Medical Tests and Procedures  Electrolyte and Renal Panel: Magnesium, Phosphorus, Sodium  Criteria: As clinically indicated    Gastrointestinal Profile: Aspartate aminotransferase (AST), Alkaline phosphatase  Criteria: As clinically indicated    Glucose/Endocrine Profile: Glucose, casual  Criteria: As clinically indicated    Nutritional Anemia Profile: Hematocrit, Hemoglobin  Criteria: As  clinically indicated       Nutrition Focused Physical Findings  Adipose: Loss of subcutaneous fat       Digestive System: Decrease in appetite, Vomiting, Nausea    Muscles: Muscle atrophy    Skin: Other (Comment)  Other: overall appearance and I/Os       Follow Up  Time Spent (min): 60 minutes  Last Date of Nutrition Visit: 05/24/24  Nutrition Follow-Up Needed?: Dietitian to reassess per policy  Follow up Comment: ZACHARY Graves

## 2024-05-25 LAB
AFP SERPL-MCNC: <4 NG/ML (ref 0–9)
ALBUMIN SERPL BCP-MCNC: 3.2 G/DL (ref 3.4–5)
ALP SERPL-CCNC: 326 U/L (ref 33–136)
ALT SERPL W P-5'-P-CCNC: 35 U/L (ref 7–45)
ANION GAP SERPL CALC-SCNC: 12 MMOL/L (ref 10–20)
AST SERPL W P-5'-P-CCNC: 82 U/L (ref 9–39)
ATRIAL RATE: 109 BPM
BILIRUB SERPL-MCNC: 2.3 MG/DL (ref 0–1.2)
BUN SERPL-MCNC: 13 MG/DL (ref 6–23)
CALCIUM SERPL-MCNC: 8.6 MG/DL (ref 8.6–10.3)
CERULOPLASMIN SERPL-MCNC: 23.5 MG/DL (ref 20–60)
CHLORIDE SERPL-SCNC: 99 MMOL/L (ref 98–107)
CO2 SERPL-SCNC: 26 MMOL/L (ref 21–32)
CREAT SERPL-MCNC: 0.46 MG/DL (ref 0.5–1.05)
EGFRCR SERPLBLD CKD-EPI 2021: >90 ML/MIN/1.73M*2
ERYTHROCYTE [DISTWIDTH] IN BLOOD BY AUTOMATED COUNT: 13.2 % (ref 11.5–14.5)
GLUCOSE SERPL-MCNC: 103 MG/DL (ref 74–99)
HAV AB SER QL IA: REACTIVE
HBV SURFACE AB SER-ACNC: <3.1 MIU/ML
HBV SURFACE AG SERPL QL IA: NONREACTIVE
HCT VFR BLD AUTO: 26.4 % (ref 36–46)
HCV AB SER QL: NONREACTIVE
HGB BLD-MCNC: 9.9 G/DL (ref 12–16)
MAGNESIUM SERPL-MCNC: 1.6 MG/DL (ref 1.6–2.4)
MCH RBC QN AUTO: 36.3 PG (ref 26–34)
MCHC RBC AUTO-ENTMCNC: 37.5 G/DL (ref 32–36)
MCV RBC AUTO: 97 FL (ref 80–100)
NRBC BLD-RTO: 0 /100 WBCS (ref 0–0)
P AXIS: 91 DEGREES
P OFFSET: 208 MS
P ONSET: 152 MS
PLATELET # BLD AUTO: 83 X10*3/UL (ref 150–450)
POTASSIUM SERPL-SCNC: 3.2 MMOL/L (ref 3.5–5.3)
PR INTERVAL: 140 MS
PROT SERPL-MCNC: 5.3 G/DL (ref 6.4–8.2)
Q ONSET: 222 MS
QRS COUNT: 18 BEATS
QRS DURATION: 74 MS
QT INTERVAL: 324 MS
QTC CALCULATION(BAZETT): 436 MS
QTC FREDERICIA: 395 MS
R AXIS: 86 DEGREES
RBC # BLD AUTO: 2.73 X10*6/UL (ref 4–5.2)
SODIUM SERPL-SCNC: 134 MMOL/L (ref 136–145)
T AXIS: 149 DEGREES
T OFFSET: 384 MS
VENTRICULAR RATE: 109 BPM
WBC # BLD AUTO: 7.3 X10*3/UL (ref 4.4–11.3)

## 2024-05-25 PROCEDURE — 84075 ASSAY ALKALINE PHOSPHATASE: CPT | Performed by: PHYSICIAN ASSISTANT

## 2024-05-25 PROCEDURE — 86706 HEP B SURFACE ANTIBODY: CPT | Mod: AHULAB | Performed by: NURSE PRACTITIONER

## 2024-05-25 PROCEDURE — 99231 SBSQ HOSP IP/OBS SF/LOW 25: CPT | Performed by: INTERNAL MEDICINE

## 2024-05-25 PROCEDURE — 82105 ALPHA-FETOPROTEIN SERUM: CPT | Mod: AHULAB | Performed by: NURSE PRACTITIONER

## 2024-05-25 PROCEDURE — 99232 SBSQ HOSP IP/OBS MODERATE 35: CPT | Performed by: INTERNAL MEDICINE

## 2024-05-25 PROCEDURE — 94640 AIRWAY INHALATION TREATMENT: CPT

## 2024-05-25 PROCEDURE — 2500000002 HC RX 250 W HCPCS SELF ADMINISTERED DRUGS (ALT 637 FOR MEDICARE OP, ALT 636 FOR OP/ED): Mod: MUE | Performed by: INTERNAL MEDICINE

## 2024-05-25 PROCEDURE — 86708 HEPATITIS A ANTIBODY: CPT | Mod: AHULAB | Performed by: NURSE PRACTITIONER

## 2024-05-25 PROCEDURE — 87340 HEPATITIS B SURFACE AG IA: CPT | Mod: AHULAB | Performed by: NURSE PRACTITIONER

## 2024-05-25 PROCEDURE — 85027 COMPLETE CBC AUTOMATED: CPT | Performed by: PHYSICIAN ASSISTANT

## 2024-05-25 PROCEDURE — 36415 COLL VENOUS BLD VENIPUNCTURE: CPT | Performed by: NURSE PRACTITIONER

## 2024-05-25 PROCEDURE — 2500000004 HC RX 250 GENERAL PHARMACY W/ HCPCS (ALT 636 FOR OP/ED): Performed by: PHYSICIAN ASSISTANT

## 2024-05-25 PROCEDURE — 82104 ALPHA-1-ANTITRYPSIN PHENO: CPT | Performed by: NURSE PRACTITIONER

## 2024-05-25 PROCEDURE — 1200000002 HC GENERAL ROOM WITH TELEMETRY DAILY

## 2024-05-25 PROCEDURE — 2500000001 HC RX 250 WO HCPCS SELF ADMINISTERED DRUGS (ALT 637 FOR MEDICARE OP): Performed by: PHYSICIAN ASSISTANT

## 2024-05-25 PROCEDURE — 83735 ASSAY OF MAGNESIUM: CPT | Performed by: INTERNAL MEDICINE

## 2024-05-25 PROCEDURE — 2500000002 HC RX 250 W HCPCS SELF ADMINISTERED DRUGS (ALT 637 FOR MEDICARE OP, ALT 636 FOR OP/ED): Performed by: GENERAL PRACTICE

## 2024-05-25 PROCEDURE — 86803 HEPATITIS C AB TEST: CPT | Mod: AHULAB | Performed by: NURSE PRACTITIONER

## 2024-05-25 PROCEDURE — 2500000002 HC RX 250 W HCPCS SELF ADMINISTERED DRUGS (ALT 637 FOR MEDICARE OP, ALT 636 FOR OP/ED): Performed by: PEDIATRICS

## 2024-05-25 RX ADMIN — PANTOPRAZOLE SODIUM 40 MG: 40 TABLET, DELAYED RELEASE ORAL at 06:29

## 2024-05-25 RX ADMIN — THIAMINE HYDROCHLORIDE 100 MG: 100 INJECTION, SOLUTION INTRAMUSCULAR; INTRAVENOUS at 09:19

## 2024-05-25 RX ADMIN — IPRATROPIUM BROMIDE AND ALBUTEROL SULFATE 3 ML: 2.5; .5 SOLUTION RESPIRATORY (INHALATION) at 12:26

## 2024-05-25 RX ADMIN — FOLIC ACID 1 MG: 1 TABLET ORAL at 08:23

## 2024-05-25 RX ADMIN — BUDESONIDE INHALATION 0.5 MG: 0.5 SUSPENSION RESPIRATORY (INHALATION) at 19:47

## 2024-05-25 RX ADMIN — FORMOTEROL FUMARATE DIHYDRATE 20 MCG: 20 SOLUTION RESPIRATORY (INHALATION) at 19:47

## 2024-05-25 RX ADMIN — BUDESONIDE INHALATION 0.5 MG: 0.5 SUSPENSION RESPIRATORY (INHALATION) at 07:09

## 2024-05-25 RX ADMIN — Medication 1 TABLET: at 08:23

## 2024-05-25 RX ADMIN — FORMOTEROL FUMARATE DIHYDRATE 20 MCG: 20 SOLUTION RESPIRATORY (INHALATION) at 07:09

## 2024-05-25 RX ADMIN — IPRATROPIUM BROMIDE AND ALBUTEROL SULFATE 3 ML: 2.5; .5 SOLUTION RESPIRATORY (INHALATION) at 07:09

## 2024-05-25 RX ADMIN — IPRATROPIUM BROMIDE AND ALBUTEROL SULFATE 3 ML: 2.5; .5 SOLUTION RESPIRATORY (INHALATION) at 19:47

## 2024-05-25 RX ADMIN — POTASSIUM CHLORIDE 20 MEQ: 1500 TABLET, EXTENDED RELEASE ORAL at 08:23

## 2024-05-25 ASSESSMENT — COGNITIVE AND FUNCTIONAL STATUS - GENERAL
MOBILITY SCORE: 18
DRESSING REGULAR LOWER BODY CLOTHING: A LITTLE
STANDING UP FROM CHAIR USING ARMS: A LOT
DAILY ACTIVITIY SCORE: 17
TOILETING: A LITTLE
WALKING IN HOSPITAL ROOM: A LOT
DRESSING REGULAR UPPER BODY CLOTHING: A LITTLE
PERSONAL GROOMING: A LOT
CLIMB 3 TO 5 STEPS WITH RAILING: A LOT
HELP NEEDED FOR BATHING: A LOT
PATIENT BASELINE BEDBOUND: NO

## 2024-05-25 ASSESSMENT — ACTIVITIES OF DAILY LIVING (ADL)
BATHING: INDEPENDENT
PATIENT'S MEMORY ADEQUATE TO SAFELY COMPLETE DAILY ACTIVITIES?: YES
GROOMING: NEEDS ASSISTANCE
JUDGMENT_ADEQUATE_SAFELY_COMPLETE_DAILY_ACTIVITIES: YES
ADEQUATE_TO_COMPLETE_ADL: YES
TOILETING: NEEDS ASSISTANCE
ASSISTIVE_DEVICE: WALKER
FEEDING YOURSELF: INDEPENDENT
WALKS IN HOME: NEEDS ASSISTANCE
HEARING - LEFT EAR: FUNCTIONAL
DRESSING YOURSELF: INDEPENDENT
HEARING - RIGHT EAR: FUNCTIONAL

## 2024-05-25 ASSESSMENT — PAIN SCALES - GENERAL: PAINLEVEL_OUTOF10: 0 - NO PAIN

## 2024-05-25 ASSESSMENT — PAIN - FUNCTIONAL ASSESSMENT: PAIN_FUNCTIONAL_ASSESSMENT: 0-10

## 2024-05-25 NOTE — PROGRESS NOTES
"Svitlana Perry is a 74 y.o. female on day 2 of admission presenting with General weakness.    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   Pulmonary:      Effort: Pulmonary effort is normal.   Abdominal:      General: Abdomen is flat.      Palpations: Abdomen is soft.   Neurological:      General: No focal deficit present.      Mental Status: She is oriented to person, place, and time. Mental status is at baseline.   Psychiatric:         Mood and Affect: Mood normal.         Behavior: Behavior normal.         Last Recorded Vitals  Blood pressure 95/56, pulse 97, temperature 36.7 °C (98.1 °F), temperature source Oral, resp. rate 18, height 1.651 m (5' 5\"), weight 58.5 kg (128 lb 15.5 oz), SpO2 99%.  Intake/Output last 3 Shifts:  I/O last 3 completed shifts:  In: 460 (7.9 mL/kg) [P.O.:360; IV Piggyback:100]  Out: 250 (4.3 mL/kg) [Urine:250 (0.1 mL/kg/hr)]  Weight: 58.5 kg     Relevant Results    CT angio chest for pulmonary embolism    Result Date: 5/23/2024  Interpreted By:  Catarino Parra, STUDY: CT ANGIO CHEST FOR PULMONARY EMBOLISM;  5/23/2024 5:28 pm   INDICATION: Signs/Symptoms:sob, tachy.   COMPARISON: 09/14/2023   ACCESSION NUMBER(S): [id number]   ORDERING CLINICIAN: NIKOS SPENCER   TECHNIQUE: Helical data acquisition of the chest was obtained with IV contrast material. 75 mL of Omnipaque 350. MIP reconstructions. Images were reformatted in axial, coronal, and sagittal planes.   FINDINGS: Lungs and Pleura: Few small 3 mm right upper lobe pulmonary nodules. Calcified granuloma. No pulmonary consolidation. No pleural effusion. No pneumothorax.   Mediastinum and axilla: No evidence of " pulmonary embolism. No adenopathy by CT size criteria. No cardiomegaly or pericardial effusion. No thoracic aortic aneurysm. Atherosclerosis. Advanced coronary artery calcifications. Small amount of mucus in the trachea.   Visualized Upper Abdomen: Diffuse hepatic hypodensity suggestive of hepatic steatosis. Calcified granuloma in the spleen.   MSK/Chest Wall: No aggressive bony lesion identified. Scattered small marginal osteophytes in the spine.       No evidence of pulmonary embolism.   Right upper lobe 3 mm pulmonary nodules. No further follow-up is required, however, if the patient has high risk factors for primary lung malignancy, follow-up noncontrast CT scan chest in 12 months may be obtained. (Sandro Bass et al., Guidelines for management of incidental pulmonary nodules detected on CT images: From the Fleischner Society 2017, Radiology. 2017 Jul;284 (1):228-243.)   Atherosclerosis and coronary artery calcifications.   Signed by: Catarino Parra 5/23/2024 5:59 PM Dictation workstation:   [id number]    ECG 12 lead    Result Date: 5/23/2024  Sinus tachycardia with occasional Premature ventricular complexes Nonspecific ST and T wave abnormality Abnormal ECG When compared with ECG of 14-SEP-2023 18:33, Previous ECG has undetermined rhythm, needs review Nonspecific T wave abnormality, worse in Inferior leads T wave inversion now evident in Anterior leads    US gallbladder    Result Date: 5/23/2024  Interpreted By:  Kym Verma, STUDY: US GALLBLADDER;  5/23/2024 2:53 pm   INDICATION: Signs/Symptoms:Significantly elevated bilirubin and alk phos/transaminitis compared to priors..   COMPARISON: None.   ACCESSION NUMBER(S): [id number]   ORDERING CLINICIAN: NIKOS SPENCER   TECHNIQUE: Multiple images of the abdomen were obtained.   FINDINGS: LIVER: The echogenicity of the liver is increased in coarsened consistent with fatty infiltration. There is no hepatic mass. The sagittal dimension of the right lobe of  the liver is 18.3 cm, nonenlarged   GALLBLADDER: The gallbladder is nondistended. The gallbladder wall is not thickened. There are no gallstones. There is no sludge. There is no pericholecystic fluid.     BILE DUCTS: There is no intrahepatic biliary dilatation. The common bile duct is  nondilated measuring 0.4 cm.   PANCREAS: Completely obscured secondary to shadowing from bowel gas   RIGHT KIDNEY: The right kidney is  normal in size measuring 10.6 cm in length.   The echogenicity of the cortex is within normal limits. There is no renal mass. There is no intrarenal calculus or hydronephrosis.       1. Fatty infiltration of the liver. This can cause abnormal liver function tests. 2.   MACRO: None   Signed by: Kym Verma 5/23/2024 2:59 PM Dictation workstation:   [id number]    XR chest 1 view    Result Date: 5/23/2024  Interpreted By:  Mike Masters, STUDY: XR CHEST 1 VIEW;  5/23/2024 12:10 pm   INDICATION: Signs/Symptoms:Chest Pain.   COMPARISON: None.   ACCESSION NUMBER(S): [id number]   ORDERING CLINICIAN: NIKOS SPENCER   FINDINGS:     CARDIOMEDIASTINAL SILHOUETTE: Cardiomediastinal silhouette is normal in size and configuration.   LUNGS: No consolidation, pneumothorax, or significant effusion.   ABDOMEN: No remarkable upper abdominal findings.   BONES: No acute osseous changes.       1.  No evidence of acute cardiopulmonary process.     Signed by: Mike Masters 5/23/2024 12:24 PM Dictation workstation:   [id number]          Malnutrition Diagnosis Status: New  Malnutrition Diagnosis: Severe malnutrition related to chronic disease or condition  As Evidenced by: prolonged poor intake prior to hospital admit less than 50% of estimated energy needs for greater than 5 days, severe fat loss, severe muscle loss, history of alcohol cirrhosis and hepatitis  I agree with the dietitian's malnutrition diagnosis.      Assessment/Plan   Principal Problem:    General weakness  Active Problems:    Alcohol abuse     Hypokalemia    Hypomagnesemia    Tachycardia    Unable to ambulate    Failure to thrive in adult    Hypoxia    Elevated troponin    Elevated liver enzymes    COPD exacerbation (Multi)    Hyponatremia    Rash    Generalized weakness    Prognosis is good for short term but recidivism will limit her life expectancy; she has no desire to change her situation at this time and denies food insecurity.         I spent 15 minutes in the professional and overall care of this patient.      Mk Saucedo, DO

## 2024-05-25 NOTE — CARE PLAN
The patient's goals for the shift include Patient will remain free from pain this shift    The clinical goals for the shift include patient will remain safe and free from falls/injury this shift    Over the shift, the patient did make progress toward the following goals.   Problem: Safety - Adult  Goal: Free from fall injury  Outcome: Progressing     Problem: Pain - Adult  Goal: Verbalizes/displays adequate comfort level or baseline comfort level  Outcome: Progressing

## 2024-05-25 NOTE — PROGRESS NOTES
"Svitlana Perry is a 74 y.o. female on day 2 of admission presenting with General weakness.    Subjective   No acute events overnight. Appears more alert this AM. Waiting to work with PT OT        Objective     VITALS  Blood pressure 95/56, pulse 97, temperature 36.7 °C (98.1 °F), temperature source Oral, resp. rate 18, height 1.651 m (5' 5\"), weight 58.5 kg (128 lb 15.5 oz), SpO2 99%.  Physical Exam  Constitutional:       Appearance: Normal appearance.   Cardiovascular:      Rate and Rhythm: Normal rate and regular rhythm.   Pulmonary:      Effort: Pulmonary effort is normal.      Breath sounds: Normal breath sounds.   Abdominal:      General: Abdomen is flat. Bowel sounds are normal.      Palpations: Abdomen is soft.   Musculoskeletal:      Cervical back: Normal range of motion.   Skin:     General: Skin is warm.   Neurological:      General: No focal deficit present.      Mental Status: She is alert and oriented to person, place, and time. Mental status is at baseline.   Psychiatric:         Mood and Affect: Mood normal.         Behavior: Behavior normal.         Thought Content: Thought content normal.         Judgment: Judgment normal.           Intake/Output last 3 Shifts:  I/O last 3 completed shifts:  In: 460 (7.9 mL/kg) [P.O.:360; IV Piggyback:100]  Out: 250 (4.3 mL/kg) [Urine:250 (0.1 mL/kg/hr)]  Weight: 58.5 kg     Relevant Results  Results for orders placed or performed during the hospital encounter of 05/23/24 (from the past 24 hour(s))   Protime-INR   Result Value Ref Range    Protime 11.2 9.8 - 12.8 seconds    INR 1.0 0.9 - 1.1   CBC   Result Value Ref Range    WBC 7.3 4.4 - 11.3 x10*3/uL    nRBC 0.0 0.0 - 0.0 /100 WBCs    RBC 2.73 (L) 4.00 - 5.20 x10*6/uL    Hemoglobin 9.9 (L) 12.0 - 16.0 g/dL    Hematocrit 26.4 (L) 36.0 - 46.0 %    MCV 97 80 - 100 fL    MCH 36.3 (H) 26.0 - 34.0 pg    MCHC 37.5 (H) 32.0 - 36.0 g/dL    RDW 13.2 11.5 - 14.5 %    Platelets 83 (L) 150 - 450 x10*3/uL   Comprehensive " metabolic panel   Result Value Ref Range    Glucose 103 (H) 74 - 99 mg/dL    Sodium 134 (L) 136 - 145 mmol/L    Potassium 3.2 (L) 3.5 - 5.3 mmol/L    Chloride 99 98 - 107 mmol/L    Bicarbonate 26 21 - 32 mmol/L    Anion Gap 12 10 - 20 mmol/L    Urea Nitrogen 13 6 - 23 mg/dL    Creatinine 0.46 (L) 0.50 - 1.05 mg/dL    eGFR >90 >60 mL/min/1.73m*2    Calcium 8.6 8.6 - 10.3 mg/dL    Albumin 3.2 (L) 3.4 - 5.0 g/dL    Alkaline Phosphatase 326 (H) 33 - 136 U/L    Total Protein 5.3 (L) 6.4 - 8.2 g/dL    AST 82 (H) 9 - 39 U/L    Bilirubin, Total 2.3 (H) 0.0 - 1.2 mg/dL    ALT 35 7 - 45 U/L   Magnesium   Result Value Ref Range    Magnesium 1.60 1.60 - 2.40 mg/dL       Imaging Results  CT angio chest for pulmonary embolism   Final Result   No evidence of pulmonary embolism.        Right upper lobe 3 mm pulmonary nodules. No further follow-up is   required, however, if the patient has high risk factors for primary   lung malignancy, follow-up noncontrast CT scan chest in 12 months may   be obtained. (Sandro Bass et al., Guidelines for management of   incidental pulmonary nodules detected on CT images: From the   Fleischner Society 2017, Radiology. 2017 Jul;284 (1):228-243.)        Atherosclerosis and coronary artery calcifications.        Signed by: Catarino Parra 5/23/2024 5:59 PM   Dictation workstation:   [id number]      US gallbladder   Final Result   1. Fatty infiltration of the liver. This can cause abnormal liver   function tests.   2.        MACRO:   None        Signed by: Kym Verma 5/23/2024 2:59 PM   Dictation workstation:   [id number]      XR chest 1 view   Final Result   1.  No evidence of acute cardiopulmonary process.             Signed by: Mike Masters 5/23/2024 12:24 PM   Dictation workstation:   [id number]          Medications:  budesonide, 0.5 mg, nebulization, BID  [Held by provider] enoxaparin, 40 mg, subcutaneous, q24h  folic acid, 1 mg, oral, Daily  formoterol, 20 mcg, nebulization,  BID  ipratropium-albuteroL, 3 mL, nebulization, TID  multivitamin with minerals, 1 tablet, oral, Daily  pantoprazole, 40 mg, oral, Daily before breakfast   Or  pantoprazole, 40 mg, intravenous, Daily before breakfast  potassium chloride CR, 20 mEq, oral, Daily  [START ON 5/26/2024] thiamine, 100 mg, oral, Daily  thiamine, 100 mg, intravenous, Daily       PRN medications: acetaminophen **OR** acetaminophen **OR** acetaminophen, albuterol, melatonin, ondansetron ODT **OR** ondansetron, oxygen        Assessment/Plan   Principal Problem:    General weakness  Active Problems:    Alcohol abuse    Hypokalemia    Hypomagnesemia    Tachycardia    Unable to ambulate    Failure to thrive in adult    Hypoxia    Elevated troponin    Elevated liver enzymes    COPD exacerbation (Multi)    Hyponatremia    Rash    Generalized weakness    Etohism  -continue to monitor for withdrawal     HypoK  -replace and monitor    HypoMg  -replace  Elevated bili, LFTs, thrombocytopenia  -from fatty liver + etohism  -f/up GI. Counseled for etoh cessation, folate, thiamine.     ? Aecopd... no bronchospasm today, no lung infiltrates, on room air.... stop prednisone for now.     Lung nodules  -outpt monitoring, repeat CT 12 months.     PT/ot      DVT Prophylaxis:  Lovenox subq    Disposition:  Likely to SNF     Dick Resendez,  [facility] Medicine

## 2024-05-26 LAB
ALBUMIN SERPL BCP-MCNC: 3 G/DL (ref 3.4–5)
ALP SERPL-CCNC: 293 U/L (ref 33–136)
ALT SERPL W P-5'-P-CCNC: 38 U/L (ref 7–45)
ANION GAP SERPL CALC-SCNC: 10 MMOL/L (ref 10–20)
AST SERPL W P-5'-P-CCNC: 74 U/L (ref 9–39)
BILIRUB SERPL-MCNC: 1.6 MG/DL (ref 0–1.2)
BUN SERPL-MCNC: 13 MG/DL (ref 6–23)
CALCIUM SERPL-MCNC: 7.8 MG/DL (ref 8.6–10.3)
CHLORIDE SERPL-SCNC: 101 MMOL/L (ref 98–107)
CO2 SERPL-SCNC: 29 MMOL/L (ref 21–32)
CREAT SERPL-MCNC: 0.43 MG/DL (ref 0.5–1.05)
EGFRCR SERPLBLD CKD-EPI 2021: >90 ML/MIN/1.73M*2
ERYTHROCYTE [DISTWIDTH] IN BLOOD BY AUTOMATED COUNT: 13.8 % (ref 11.5–14.5)
GLUCOSE SERPL-MCNC: 87 MG/DL (ref 74–99)
HCT VFR BLD AUTO: 27.2 % (ref 36–46)
HGB BLD-MCNC: 9.9 G/DL (ref 12–16)
MCH RBC QN AUTO: 35.9 PG (ref 26–34)
MCHC RBC AUTO-ENTMCNC: 36.4 G/DL (ref 32–36)
MCV RBC AUTO: 99 FL (ref 80–100)
NRBC BLD-RTO: 0.3 /100 WBCS (ref 0–0)
PLATELET # BLD AUTO: 92 X10*3/UL (ref 150–450)
POTASSIUM SERPL-SCNC: 3.2 MMOL/L (ref 3.5–5.3)
PROT SERPL-MCNC: 4.9 G/DL (ref 6.4–8.2)
RBC # BLD AUTO: 2.76 X10*6/UL (ref 4–5.2)
SODIUM SERPL-SCNC: 137 MMOL/L (ref 136–145)
WBC # BLD AUTO: 6.9 X10*3/UL (ref 4.4–11.3)

## 2024-05-26 PROCEDURE — 80053 COMPREHEN METABOLIC PANEL: CPT | Performed by: PHYSICIAN ASSISTANT

## 2024-05-26 PROCEDURE — 94640 AIRWAY INHALATION TREATMENT: CPT

## 2024-05-26 PROCEDURE — 2500000004 HC RX 250 GENERAL PHARMACY W/ HCPCS (ALT 636 FOR OP/ED): Performed by: PHYSICIAN ASSISTANT

## 2024-05-26 PROCEDURE — 97535 SELF CARE MNGMENT TRAINING: CPT | Mod: GO

## 2024-05-26 PROCEDURE — 1200000002 HC GENERAL ROOM WITH TELEMETRY DAILY

## 2024-05-26 PROCEDURE — 97161 PT EVAL LOW COMPLEX 20 MIN: CPT | Mod: GP

## 2024-05-26 PROCEDURE — 2500000002 HC RX 250 W HCPCS SELF ADMINISTERED DRUGS (ALT 637 FOR MEDICARE OP, ALT 636 FOR OP/ED): Performed by: INTERNAL MEDICINE

## 2024-05-26 PROCEDURE — 99232 SBSQ HOSP IP/OBS MODERATE 35: CPT | Performed by: INTERNAL MEDICINE

## 2024-05-26 PROCEDURE — 97165 OT EVAL LOW COMPLEX 30 MIN: CPT | Mod: GO

## 2024-05-26 PROCEDURE — 2500000002 HC RX 250 W HCPCS SELF ADMINISTERED DRUGS (ALT 637 FOR MEDICARE OP, ALT 636 FOR OP/ED): Performed by: PEDIATRICS

## 2024-05-26 PROCEDURE — 36415 COLL VENOUS BLD VENIPUNCTURE: CPT | Performed by: PHYSICIAN ASSISTANT

## 2024-05-26 PROCEDURE — 2500000001 HC RX 250 WO HCPCS SELF ADMINISTERED DRUGS (ALT 637 FOR MEDICARE OP): Performed by: PHYSICIAN ASSISTANT

## 2024-05-26 PROCEDURE — 99231 SBSQ HOSP IP/OBS SF/LOW 25: CPT | Performed by: INTERNAL MEDICINE

## 2024-05-26 PROCEDURE — 2500000002 HC RX 250 W HCPCS SELF ADMINISTERED DRUGS (ALT 637 FOR MEDICARE OP, ALT 636 FOR OP/ED): Performed by: GENERAL PRACTICE

## 2024-05-26 PROCEDURE — 85027 COMPLETE CBC AUTOMATED: CPT | Performed by: PHYSICIAN ASSISTANT

## 2024-05-26 RX ORDER — FLUTICASONE FUROATE, UMECLIDINIUM BROMIDE AND VILANTEROL TRIFENATATE 100; 62.5; 25 UG/1; UG/1; UG/1
1 POWDER RESPIRATORY (INHALATION) DAILY
Qty: 30 EACH | Refills: 0 | Status: SHIPPED | OUTPATIENT
Start: 2024-05-26 | End: 2024-06-25

## 2024-05-26 RX ADMIN — Medication 1 TABLET: at 08:19

## 2024-05-26 RX ADMIN — SODIUM CHLORIDE, POTASSIUM CHLORIDE, SODIUM LACTATE AND CALCIUM CHLORIDE 100 ML/HR: 600; 310; 30; 20 INJECTION, SOLUTION INTRAVENOUS at 20:58

## 2024-05-26 RX ADMIN — BUDESONIDE INHALATION 0.5 MG: 0.5 SUSPENSION RESPIRATORY (INHALATION) at 19:38

## 2024-05-26 RX ADMIN — FORMOTEROL FUMARATE DIHYDRATE 20 MCG: 20 SOLUTION RESPIRATORY (INHALATION) at 19:38

## 2024-05-26 RX ADMIN — POTASSIUM CHLORIDE 20 MEQ: 1500 TABLET, EXTENDED RELEASE ORAL at 08:18

## 2024-05-26 RX ADMIN — BUDESONIDE INHALATION 0.5 MG: 0.5 SUSPENSION RESPIRATORY (INHALATION) at 07:05

## 2024-05-26 RX ADMIN — FOLIC ACID 1 MG: 1 TABLET ORAL at 08:18

## 2024-05-26 RX ADMIN — IPRATROPIUM BROMIDE AND ALBUTEROL SULFATE 3 ML: 2.5; .5 SOLUTION RESPIRATORY (INHALATION) at 07:05

## 2024-05-26 RX ADMIN — FORMOTEROL FUMARATE DIHYDRATE 20 MCG: 20 SOLUTION RESPIRATORY (INHALATION) at 07:05

## 2024-05-26 RX ADMIN — PANTOPRAZOLE SODIUM 40 MG: 40 TABLET, DELAYED RELEASE ORAL at 08:18

## 2024-05-26 RX ADMIN — THIAMINE HCL TAB 100 MG 100 MG: 100 TAB at 11:34

## 2024-05-26 RX ADMIN — IPRATROPIUM BROMIDE AND ALBUTEROL SULFATE 3 ML: 2.5; .5 SOLUTION RESPIRATORY (INHALATION) at 19:38

## 2024-05-26 ASSESSMENT — COGNITIVE AND FUNCTIONAL STATUS - GENERAL
HELP NEEDED FOR BATHING: A LOT
DRESSING REGULAR LOWER BODY CLOTHING: A LITTLE
STANDING UP FROM CHAIR USING ARMS: A LITTLE
DRESSING REGULAR UPPER BODY CLOTHING: A LITTLE
STANDING UP FROM CHAIR USING ARMS: A LOT
MOVING TO AND FROM BED TO CHAIR: A LITTLE
HELP NEEDED FOR BATHING: A LITTLE
TOILETING: A LITTLE
DRESSING REGULAR LOWER BODY CLOTHING: A LITTLE
PERSONAL GROOMING: A LOT
CLIMB 3 TO 5 STEPS WITH RAILING: A LOT
DAILY ACTIVITIY SCORE: 21
TOILETING: A LITTLE
WALKING IN HOSPITAL ROOM: A LITTLE
WALKING IN HOSPITAL ROOM: A LOT
MOBILITY SCORE: 20
TOILETING: A LITTLE
PERSONAL GROOMING: A LOT
STANDING UP FROM CHAIR USING ARMS: A LOT
DRESSING REGULAR UPPER BODY CLOTHING: A LITTLE
MOBILITY SCORE: 17
WALKING IN HOSPITAL ROOM: A LOT
MOVING TO AND FROM BED TO CHAIR: A LITTLE
MOBILITY SCORE: 17
HELP NEEDED FOR BATHING: A LOT
MOVING TO AND FROM BED TO CHAIR: A LITTLE
CLIMB 3 TO 5 STEPS WITH RAILING: A LITTLE
DRESSING REGULAR LOWER BODY CLOTHING: A LITTLE
CLIMB 3 TO 5 STEPS WITH RAILING: A LOT
DAILY ACTIVITIY SCORE: 17
DAILY ACTIVITIY SCORE: 17

## 2024-05-26 ASSESSMENT — ACTIVITIES OF DAILY LIVING (ADL)
ADL_ASSISTANCE: INDEPENDENT
ADL_ASSISTANCE: INDEPENDENT
HOME_MANAGEMENT_TIME_ENTRY: 10
ADL_ASSISTANCE: INDEPENDENT

## 2024-05-26 ASSESSMENT — PAIN - FUNCTIONAL ASSESSMENT
PAIN_FUNCTIONAL_ASSESSMENT: 0-10

## 2024-05-26 ASSESSMENT — PAIN SCALES - GENERAL
PAINLEVEL_OUTOF10: 0 - NO PAIN

## 2024-05-26 NOTE — PROGRESS NOTES
"Svitlana Perry is a 74 y.o. female on day 3 of admission presenting with General weakness.    Subjective   She is doing well  Discussed need to avoid alcohol and when engaged in conversation she minimized strength of what she was drinking showing poor insight and judgement  Eating well  Moving bowels well  Wants to go home but lives alone in trailer with her small dog, family not close and seems like her neighbor is the best support       Objective     Physical Exam  Constitutional:       Appearance: She is ill-appearing.   Pulmonary:      Effort: Pulmonary effort is normal.   Abdominal:      General: Abdomen is flat.      Palpations: Abdomen is soft.   Neurological:      General: No focal deficit present.      Mental Status: She is alert and oriented to person, place, and time.   Psychiatric:         Mood and Affect: Mood normal.         Last Recorded Vitals  Blood pressure 105/69, pulse 101, temperature 36.9 °C (98.5 °F), temperature source Temporal, resp. rate 18, height 1.651 m (5' 5\"), weight 58.5 kg (128 lb 15.5 oz), SpO2 100%.  Intake/Output last 3 Shifts:  No intake/output data recorded.    Relevant Results      ECG 12 lead    Result Date: 5/25/2024  Sinus tachycardia with occasional Premature ventricular complexes Nonspecific ST and T wave abnormality Abnormal ECG When compared with ECG of 14-SEP-2023 18:33, Previous ECG has undetermined rhythm, needs review Nonspecific T wave abnormality, worse in Inferior leads T wave inversion now evident in Anterior leads See ED provider note for full interpretation and clinical correlation Confirmed by Amber Roth ([id number]) on 5/25/2024 5:04:03 PM    CT angio chest for pulmonary embolism    Result Date: 5/23/2024  Interpreted By:  Catarino Parra, STUDY: CT ANGIO CHEST FOR PULMONARY EMBOLISM;  5/23/2024 5:28 pm   INDICATION: Signs/Symptoms:sob, tachy.   COMPARISON: 09/14/2023   ACCESSION NUMBER(S): [id number]   ORDERING CLINICIAN: NIKOS SPENCER" TECHNIQUE: Helical data acquisition of the chest was obtained with IV contrast material. 75 mL of Omnipaque 350. MIP reconstructions. Images were reformatted in axial, coronal, and sagittal planes.   FINDINGS: Lungs and Pleura: Few small 3 mm right upper lobe pulmonary nodules. Calcified granuloma. No pulmonary consolidation. No pleural effusion. No pneumothorax.   Mediastinum and axilla: No evidence of pulmonary embolism. No adenopathy by CT size criteria. No cardiomegaly or pericardial effusion. No thoracic aortic aneurysm. Atherosclerosis. Advanced coronary artery calcifications. Small amount of mucus in the trachea.   Visualized Upper Abdomen: Diffuse hepatic hypodensity suggestive of hepatic steatosis. Calcified granuloma in the spleen.   MSK/Chest Wall: No aggressive bony lesion identified. Scattered small marginal osteophytes in the spine.       No evidence of pulmonary embolism.   Right upper lobe 3 mm pulmonary nodules. No further follow-up is required, however, if the patient has high risk factors for primary lung malignancy, follow-up noncontrast CT scan chest in 12 months may be obtained. (Sandro Taohoraven et al., Guidelines for management of incidental pulmonary nodules detected on CT images: From the Fleischner Society 2017, Radiology. 2017 Jul;284 (1):228-243.)   Atherosclerosis and coronary artery calcifications.   Signed by: Catarino Parra 5/23/2024 5:59 PM Dictation workstation:   [id number]    US gallbladder    Result Date: 5/23/2024  Interpreted By:  Kym Verma, STUDY: US GALLBLADDER;  5/23/2024 2:53 pm   INDICATION: Signs/Symptoms:Significantly elevated bilirubin and alk phos/transaminitis compared to priors..   COMPARISON: None.   ACCESSION NUMBER(S): [id number]   ORDERING CLINICIAN: NIKOS SPENCER   TECHNIQUE: Multiple images of the abdomen were obtained.   FINDINGS: LIVER: The echogenicity of the liver is increased in coarsened consistent with fatty infiltration. There is no hepatic  mass. The sagittal dimension of the right lobe of the liver is 18.3 cm, nonenlarged   GALLBLADDER: The gallbladder is nondistended. The gallbladder wall is not thickened. There are no gallstones. There is no sludge. There is no pericholecystic fluid.     BILE DUCTS: There is no intrahepatic biliary dilatation. The common bile duct is  nondilated measuring 0.4 cm.   PANCREAS: Completely obscured secondary to shadowing from bowel gas   RIGHT KIDNEY: The right kidney is  normal in size measuring 10.6 cm in length.   The echogenicity of the cortex is within normal limits. There is no renal mass. There is no intrarenal calculus or hydronephrosis.       1. Fatty infiltration of the liver. This can cause abnormal liver function tests. 2.   MACRO: None   Signed by: Kym Verma 5/23/2024 2:59 PM Dictation workstation:   [id number]    XR chest 1 view    Result Date: 5/23/2024  Interpreted By:  Mike Masters, STUDY: XR CHEST 1 VIEW;  5/23/2024 12:10 pm   INDICATION: Signs/Symptoms:Chest Pain.   COMPARISON: None.   ACCESSION NUMBER(S): [id number]   ORDERING CLINICIAN: NIKOS SPENCER   FINDINGS:     CARDIOMEDIASTINAL SILHOUETTE: Cardiomediastinal silhouette is normal in size and configuration.   LUNGS: No consolidation, pneumothorax, or significant effusion.   ABDOMEN: No remarkable upper abdominal findings.   BONES: No acute osseous changes.       1.  No evidence of acute cardiopulmonary process.     Signed by: Mike Masters 5/23/2024 12:24 PM Dictation workstation:   [id number]    * Cannot find OR log *  Last relevant procedure:                     Malnutrition Diagnosis Status: New  Malnutrition Diagnosis: Severe malnutrition related to chronic disease or condition  As Evidenced by: prolonged poor intake prior to hospital admit less than 50% of estimated energy needs for greater than 5 days, severe fat loss, severe muscle loss, history of alcohol cirrhosis and hepatitis  I agree with the dietitian's malnutrition  diagnosis.      Assessment/Plan   Principal Problem:    General weakness  Active Problems:    Alcohol abuse    Hypokalemia    Hypomagnesemia    Tachycardia    Unable to ambulate    Failure to thrive in adult    Hypoxia    Elevated troponin    Elevated liver enzymes    COPD exacerbation (Multi)    Hyponatremia    Rash    Generalized weakness    LFTs continue to improve so she is safe for discharge from a medical perspective.  I cannot predict the social aspect of her alcoholism       I spent 20 minutes in the professional and overall care of this patient.      Mk Saucedo, DO

## 2024-05-26 NOTE — PROGRESS NOTES
Occupational Therapy    Evaluation/Treatment    Patient Name: Svitlana Perry  MRN: [id number]  : 1950  Today's Date: 24  Time Calculation  Start Time: 1150  Stop Time: 1215  Time Calculation (min): 25 min     Assessment:  OT Assessment: 73 yo presenting with minor balance/safety deficits and decr, safety with functional mobility and ADL management. Recommended pt obtain tub transfer bench (reports she may have on in shed). Anticipate Low intensity OT at dc, will follow per POC  Prognosis: Good  Barriers to Discharge: Decreased caregiver support  Evaluation/Treatment Tolerance: Patient tolerated treatment well  Medical Staff Made Aware: Yes  End of Session Communication: Bedside nurse, Care Coordinator  End of Session Patient Position: Alarm on, Up in chair  OT Assessment Results: Decreased safe judgment during ADL, Decreased cognition, Decreased ADL status, Decreased functional mobility, Decreased IADLs  Prognosis: Good  Barriers to Discharge: Decreased caregiver support  Evaluation/Treatment Tolerance: Patient tolerated treatment well  Medical Staff Made Aware: Yes  Strengths: Ability to acquire knowledge, Housing layout, Access to adaptive/assistive products, Attitude of self  Plan:  OT Frequency: 2 times per week  OT Discharge Recommendations: Low intensity level of continued care  Equipment Recommended upon Discharge: Wheeled walker  OT Recommended Transfer Status: Stand by assist  OT - OK to Discharge: Yes       Subjective   Current Problem:  1. Generalized weakness        2. Failure to thrive in adult        3. COPD exacerbation (Multi)        4. Hypokalemia        5. Hypomagnesemia        6. Asthma, unspecified asthma severity, unspecified whether complicated, unspecified whether persistent ([facility])  fluticasone-umeclidin-vilanter (Trelegy Ellipta) 100-62.5-25 mcg blister with device        General:   OT Received On: 24  General  Reason for Referral:  decline in ADLs; presented to ED  with FTT and in ability to walk for the last 3 to 5 days because pt feels weak, relying on neighbors for increased support   Referred By: Keren Pereyra PA-C  Past Medical History Relevant to Rehab: general weakness, unable to ambulate, failure to thrive, hypoxia, tachycardia, COPD exacerbation, hypomagnesia and, hyponatremia, hypokalemia, elevated troponin, elevated liver enzymes.  Family/Caregiver Present: No  Co-Treatment: PT  Co-Treatment Reason: To maximize pt participation and safety with out of bed mobility  Prior to Session Communication: Bedside nurse  Patient Position Received: Bed, 3 rail up, Alarm on  Preferred Learning Style: auditory, verbal  General Comment: Pt supine in bed, pleasant and cooperative, limited in insight, needing cuing throughout, but no true hands on assist.  Precautions:  Medical Precautions: Fall precautions, Oxygen therapy device and L/min  Precautions Comment: Hx of EtOH    Pain:  Pain Assessment  Pain Assessment: 0-10  Pain Score: 0 - No pain    Objective   Cognition:  Overall Cognitive Status: Within Functional Limits  Arousal/Alertness: Appropriate responses to stimuli  Orientation Level: Oriented X4  Safety Judgment: Decreased awareness of need for safety precautions  Problem Solving: Assistance required to identify errors made  Cognition Comments: pt oriented x4 though has poor safety awareness/insight and requires mod verbal cues throughout for safety  Complex Functional Tasks: Moderate  Unable to Self-Monitor and Self-Correct Consistently: Minimal  Insight: Moderate  Impulsive: Mildly  Task Initiation: Initiates with cues  Planning: Reduced planning skills  Organization: Mildly disorganized      Home Living:  Type of Home: Mobile home  Lives With: Alone  Home Adaptive Equipment: Walker rolling or standard  Home Layout: One level, Able to live on main level with bedroom/bathroom  Home Access: Stairs to enter with rails  Entrance Stairs-Rails:  (Single)  Prior  Function:  Level of Hart: Independent with ADLs and functional transfers, Independent with homemaking with ambulation  Receives Help From: Neighbor  ADL Assistance: Independent (Wears depends at baseline for known incontinence)  Homemaking Assistance: Needs assistance (Reports for past few days, neighbor has been assisting in meal prep, was independent prior to admission)  Ambulatory Assistance: Independent (Reports independent with ambulation, but has been getting harder over 3-5 days, was unable to ambulate at all just prior to admission.)  Prior Function Comments: +  IADL History:  Homemaking Responsibilities: Yes  IADL Comments: decr. ability to manage IADLs recently  ADL:  Grooming Assistance: Stand by  Grooming Deficit: Verbal cueing (cues for hand placement on sink for safety; also required verbal cues for washing hands after BM-- decr. hygiene awareness)  LE Dressing Assistance: Stand by  LE Dressing Deficit: Don/doff R sock, Don/doff L sock, Thread RLE into underwear, Thread LLE into underwear  Toileting Assistance with Device: Modified independent  Toileting Deficit: Verbal cueing (pt completed toileting in restroom, use of grab bar during trnasfers; able to complete leilani care without assist)    Activity Tolerance:  Endurance: Tolerates 10 - 20 min exercise with multiple rests     Bed Mobility/Transfers: Bed Mobility  Bed Mobility: Yes  Bed Mobility 1  Bed Mobility 1: Supine to sitting  Level of Assistance 1: Close supervision  Bed Mobility Comments 1: HOB elevated, use of bed rails    Transfers  Transfer: Yes  Transfer 1  Technique 1: Sit to stand, Stand to sit  Transfer Device 1: Walker  Transfer Level of Assistance 1: Close supervision  Trials/Comments 1: cues for hand placement during all transfers to/from sitting  Transfers 2  Transfer From 2: Sit to, Stand to  Transfer to 2: Toilet  Transfer Device 2: Walker (grab bars)  Transfer Level of Assistance 2: Moderate verbal  cues  Trials/Comments 2: pt attempting to pull briefs down while facing toilet-- max v/t cues to turn to toilet prior to doffing briefs for safety; max cues to keep ww close during toilet transfers    Functional Mobility:  Functional Mobility  Functional Mobility Performed: Yes (ambulatory in room and to/from restroom with ww; cues for proximity within ww and for safety awareness, no overt balance loss)  Sitting Balance:  Static Sitting Balance  Static Sitting-Level of Assistance: Independent  Dynamic Sitting Balance  Dynamic Sitting-Balance:  (independent)  Standing Balance:  Static Standing Balance  Static Standing-Level of Assistance: Close supervision  Dynamic Standing Balance  Dynamic Standing-Balance:  (close sup/cga)    Vision:Vision - Basic Assessment  Current Vision: No visual deficits  Sensation:  Sensation Comment: Pt reports neuropathy to B fingers and toes. Reports fingers fine currently, but has numbness in B feet at this time.    Perception:  Inattention/Neglect: Appears intact  Initiation: Cues to initiate tasks  Motor Planning: Cues to use objects appropriately  Perseveration: Not present  Coordination:  Movements are Fluid and Coordinated: Yes  Coordination Comment: cues for coordinating use of ww during functional ambulation   Hand Function:  Hand Function  Gross Grasp: Functional  Coordination: Functional  Extremities: RUE   RUE : Within Functional Limits and LUE   LUE: Within Functional Limits    Outcome Measures: [facility] Daily Activity  Putting on and taking off regular lower body clothing: A little  Bathing (including washing, rinsing, drying): A little  Putting on and taking off regular upper body clothing: None  Toileting, which includes using toilet, bedpan or urinal: A little  Taking care of personal grooming such as brushing teeth: None  Eating Meals: None  Daily Activity - Total Score: 21      Education Documentation  Body Mechanics, taught by Sai Nolan OT at 5/26/2024  4:48  PM.  Learner: Patient  Readiness: Acceptance  Method: Explanation  Response: Needs Reinforcement    Precautions, taught by Sai Nolan OT at 5/26/2024  4:48 PM.  Learner: Patient  Readiness: Acceptance  Method: Explanation  Response: Needs Reinforcement    ADL Training, taught by Sai Nolan OT at 5/26/2024  4:48 PM.  Learner: Patient  Readiness: Acceptance  Method: Explanation  Response: Needs Reinforcement    Body Mechanics, taught by Sai Nolan OT at 5/26/2024  4:48 PM.  Learner: Patient  Readiness: Acceptance  Method: Explanation  Response: Verbalizes Understanding    Precautions, taught by Sai Nolan OT at 5/26/2024  4:48 PM.  Learner: Patient  Readiness: Acceptance  Method: Explanation  Response: Verbalizes Understanding    ADL Training, taught by Sai Nolan OT at 5/26/2024  4:48 PM.  Learner: Patient  Readiness: Acceptance  Method: Explanation  Response: Verbalizes Understanding    Education Comments  No comments found.      Goals:  Encounter Problems       Encounter Problems (Active)       ADLs       Patient with complete lower body dressing with modified independent level of assistance donning and doffing all LE clothes  with PRN AD while edge of bed        Start:  05/26/24    Expected End:  06/09/24            Patient will complete daily grooming tasks  with modified independent level of assistance and PRN adaptive equipment while standing.       Start:  05/26/24    Expected End:  06/09/24            Patient will complete toileting including hygiene clothing management/hygiene with modified independent level of assistance.       Start:  05/26/24    Expected End:  06/09/24               BALANCE       Pt will maintain dynamic standing balance during ADL task with modified independent level of assistance in order to demonstrate decreased risk of falling and improved postural control.       Start:  05/26/24    Expected End:  06/09/24               COGNITION/SAFETY       Pt will  demonstrate improved safety awareness as evidence by appropriately placing hands and positioning of ww during functional mobility and transfers.          MOBILITY       Patient will perform Functional mobility  Household distances/Community Distances with modified independent level of assistance and least restrictive device in order to improve safety and functional mobility.       Start:  05/26/24    Expected End:  06/09/24

## 2024-05-26 NOTE — PROGRESS NOTES
05/26/24 1028   Discharge Planning   Patient expects to be discharged to: [facility]     Met with pt at bedside. Pt given SNF list. Pt stated she has been to [facility] before. Will send referral.

## 2024-05-26 NOTE — CARE PLAN
The patient's goals for the shift include Patient will remain free from pain this shift    The clinical goals for the shift include free of falls by end of shift      Problem: Safety - Adult  Goal: Free from fall injury  Outcome: Progressing     Problem: Discharge Planning  Goal: Discharge to home or other facility with appropriate resources  Outcome: Progressing     Problem: Chronic Conditions and Co-morbidities  Goal: Patient's chronic conditions and co-morbidity symptoms are monitored and maintained or improved  Outcome: Progressing     Problem: Fall/Injury  Goal: Not fall by end of shift  Outcome: Progressing

## 2024-05-26 NOTE — PROGRESS NOTES
Physical Therapy    Physical Therapy Evaluation    Patient Name: Svitlana Perry  MRN: [id number]  Today's Date: 5/26/2024   Time Calculation  Start Time: 1153  Stop Time: 1215  Time Calculation (min): 22 min    Assessment/Plan   PT Assessment  PT Assessment Results: Decreased strength, Decreased endurance, Impaired balance, Decreased mobility, Impaired judgement, Decreased safety awareness  Rehab Prognosis: Fair  Barriers to Discharge: Decreased endurance, lacking safety awareness, inability to focus on session, stair training  Evaluation/Treatment Tolerance: Patient tolerated treatment well  Medical Staff Made Aware: Yes  Strengths: Support of Caregivers, Housing layout  Barriers to Participation: Attitude of self, Insight into problems, Ability to acquire knowledge  End of Session Communication: Bedside nurse, Care Coordinator  Assessment Comment: PT eval completed. Pt participating at fairly high level. Needing no true hands on assist this date, some instability noted, possibly from hx of EtOH. Needing increased focus on balance, endurance, and safety awareness for forward progression. Anticipate good progression with continued PT plan of care.   End of Session Patient Position: Alarm on, Up in chair  IP OR SWING BED PT PLAN  Inpatient or Swing Bed: Inpatient  PT Plan  Treatment/Interventions: Bed mobility, Transfer training, Gait training, Stair training, Neuromuscular re-education, Balance training, Endurance training, Positioning, Postural re-education, Home exercise program, Therapeutic exercise, Therapeutic activity, Strengthening  PT Plan: Skilled PT  PT Frequency: 2 times per week  PT Discharge Recommendations: Low intensity level of continued care  Equipment Recommended upon Discharge: Wheeled walker  PT Recommended Transfer Status: Stand by assist  PT - OK to Discharge: Yes (Per PT POC)      Subjective   General Visit Information:  General  Reason for Referral: Impaired Mobility, Gait training,  Impaired cognition/safety awareness  Referred By: Keren Pereyra PA-C  Past Medical History Relevant to Rehab: general weakness, unable to ambulate, failure to thrive, hypoxia, tachycardia, COPD exacerbation, hypomagnesia and, hyponatremia, hypokalemia, elevated troponin, elevated liver enzymes.  Family/Caregiver Present: No  Co-Treatment: OT  Co-Treatment Reason: To maximize pt participation and safety with out of bed mobility  Prior to Session Communication: Bedside nurse  Patient Position Received: Bed, 3 rail up, Alarm on  Preferred Learning Style: auditory, verbal  General Comment: Pt supine in bed, pleasant and cooperative, limited in insight, needing cuing throughout, but no true hands on assist.  Home Living:  Home Living  Type of Home: Mobile home  Lives With: Alone  Home Adaptive Equipment: Walker rolling or standard  Home Layout: One level, Able to live on main level with bedroom/bathroom  Home Access: Stairs to enter with rails  Entrance Stairs-Rails:  (Single)  Entrance Stairs-Number of Steps: 5  Home Living Comments: Pt reporting does not trust herself to get into shower, tends to bend over tub and wash hair/self while bent over.  Prior Level of Function:  Prior Function Per Pt/Caregiver Report  Level of [place]: Independent with ADLs and functional transfers, Independent with homemaking with ambulation  Receives Help From: Neighbor  ADL Assistance: Independent (Wears depends at baseline for known incontinence)  Homemaking Assistance: Needs assistance (Reports for past few days, neighbor has been assisting in meal prep, was independent prior to admission)  Ambulatory Assistance: Independent (Reports independent with ambulation, but has been getting harder over 3-5 days, was unable to ambulate at all just prior to admission.)  Prior Function Comments: +  Precautions:  Precautions  Medical Precautions: Fall precautions, Oxygen therapy device and L/min  Precautions Comment: Hx of  EtOH    Objective   Pain:  Pain Assessment  Pain Assessment: 0-10  Pain Score: 0 - No pain  Cognition:  Cognition  Overall Cognitive Status: Within Functional Limits  Arousal/Alertness: Appropriate responses to stimuli  Orientation Level: Oriented X4  Safety Judgment: Decreased awareness of need for safety precautions  Cognition Comments: Easily distracted, needing assist with focus  Attention: Exceptions to WFL  Alternating Attention: Impaired  Divided Attention: Impaired  Sustained Attention: Impaired  Safety/Judgement: Exceptions to WFL  Complex Functional Tasks: Moderate  Novel Situations: Moderate  Routine Tasks: Minimal  Unable to Self-Monitor and Self-Correct Consistently: Minimal  Insight: Moderate  Impulsive: Mildly  Task Initiation: Initiates with cues  Flexibility of Thought: Reduced flexibility  Planning: Reduced planning skills  Organization: Mildly disorganized    General Assessments:  General Observation  General Observation: Pt able to complete all mobility without hands on assist, but max increased verbal cues for safety               Activity Tolerance  Endurance: Tolerates 10 - 20 min exercise with multiple rests    Sensation  Light Touch: Partial deficits in the RLE, Partial deficits in the LLE  Sensation Comment: Pt reports neuropathy to B fingers and toes. Reports fingers fine currently, but has numbness in B feet at this time.    Strength  Strength Comments: Mobility without hands on assist, increased verbal cues for safety and form.  Strength  Strength Comments: Mobility without hands on assist, increased verbal cues for safety and form.    Perception  Initiation: Cues to initiate tasks  Motor Planning: Cues to use objects appropriately      Coordination  Movements are Fluid and Coordinated: Yes    Postural Control  Postural Control: Within Functional Limits  Posture Comment: Forward flexed over WW throughout session.    Static Sitting Balance  Static Sitting-Balance Support: Feet supported,  "No upper extremity supported  Static Sitting-Level of Assistance: Close supervision  Dynamic Sitting Balance  Dynamic Sitting-Balance Support: Feet supported, No upper extremity supported  Dynamic Sitting-Balance: Forward lean, Lateral lean, Trunk control activities  Dynamic Sitting-Comments: SBA at EOB    Static Standing Balance  Static Standing-Balance Support: Bilateral upper extremity supported  Static Standing-Level of Assistance: Close supervision  Dynamic Standing Balance  Dynamic Standing-Balance Support: Bilateral upper extremity supported  Dynamic Standing-Balance: Turning (Ambulation to and from restroom)  Functional Assessments:  Bed Mobility  Bed Mobility: Yes  Bed Mobility 1  Bed Mobility 1: Supine to sitting  Level of Assistance 1: Close supervision  Bed Mobility Comments 1: HOB elevated, no use of HR, cues for safety awareness as tangled in sheets.    Transfers  Transfer: Yes  Transfer 1  Technique 1: Sit to stand, Stand to sit  Transfer Device 1: Walker  Transfer Level of Assistance 1: Close supervision  Trials/Comments 1: Initially standing without device, noted instability and \"furniture surfing\" Retrieved WW and pt able to complete without hands on assist, but verbal cues for hand placement, sequencing, and safety awareness.    Ambulation/Gait Training  Ambulation/Gait Training Performed: Yes  Ambulation/Gait Training 1  Surface 1: Level tile  Device 1: Rolling walker  Assistance 1: Close supervision  Quality of Gait 1: Narrow base of support, Diminished heel strike, Decreased step length, Shuffling gait, Forward flexed posture (No hands on assist, verbal cues for safety awareness, walking from bed to chair, then from chair to doorway, then to restroom, and finally back to chair, no LOB.)  Comments/Distance (ft) 1: 3', 50', 10'    Stairs  Stairs: No  Extremity/Trunk Assessments:  RUE   RUE : Within Functional Limits  LUE   LUE: Within Functional Limits  RLE   RLE : Within Functional Limits  LLE "   LLE : Within Functional Limits  Outcome Measures:  [facility] Basic Mobility  Turning from your back to your side while in a flat bed without using bedrails: None  Moving from lying on your back to sitting on the side of a flat bed without using bedrails: None  Moving to and from bed to chair (including a wheelchair): A little  Standing up from a chair using your arms (e.g. wheelchair or bedside chair): A little  To walk in hospital room: A little  Climbing 3-5 steps with railing: A little  Basic Mobility - Total Score: 20    Encounter Problems       Encounter Problems (Active)       Balance       STG - Maintains dynamic standing balance with upper extremity support At MOD I, safely, without LOB, device PRN        Start:  05/26/24    Expected End:  06/09/24       INTERVENTIONS:  1. Practice standing with minimal support.  2. Educate patient about standing tolerance.  3. Educate patient about independence with gait, transfers, and ADL's.  4. Educate patient about use of assistive device.  5. Educate patient about self-directed care.            Mobility       STG - Patient will ambulate At [facility] using Wheeled walker for 100' without LOB or gross gait deviations        Start:  05/26/24    Expected End:  06/09/24            STG - Patient will ascend and descend four to six stairs At MOD I while using No device and Single HR, using non-reciprocal pattern, no LOB        Start:  05/26/24    Expected End:  06/09/24            Endurance - Pt to tolerate >/= 20 minutes therex, theract, gait and/or NMR with </= 5 minutes of rest breaks        Start:  05/26/24    Expected End:  06/09/24               PT Transfers       STG - Patient will perform bed mobility At MOD I, safely, without LOB, device PRN        Start:  05/26/24    Expected End:  06/09/24            STG - Patient will transfer sit to and from stand At [facility] I, safely, without LOB, device PRN        Start:  05/26/24    Expected End:  06/09/24            STG - Patient will  perform car transfer At MOD I, safely, without LOB, device PRN        Start:  05/26/24    Expected End:  06/09/24               Pain - Adult          Safety       Pt will verbalize and apply safety awareness and precautions 100% of time throughout entire session         Start:  05/26/24    Expected End:  06/09/24                   Education Documentation  Precautions, taught by Art Diamond, PT at 5/26/2024  3:37 PM.  Learner: Patient  Readiness: Acceptance  Method: Explanation, Demonstration  Response: Needs Reinforcement    Body Mechanics, taught by Art Diamond PT at 5/26/2024  3:37 PM.  Learner: Patient  Readiness: Acceptance  Method: Explanation, Demonstration  Response: Needs Reinforcement    Mobility Training, taught by Art Diamond PT at 5/26/2024  3:37 PM.  Learner: Patient  Readiness: Acceptance  Method: Explanation, Demonstration  Response: Needs Reinforcement    Education Comments  No comments found.

## 2024-05-26 NOTE — PROGRESS NOTES
"Svitlana Perry is a 74 y.o. female on day 3 of admission presenting with General weakness.    Subjective   Feeling pretty well today. Was hoping to go home today but after a conversation with her niece she is will to wait for PT/OT eval for possible SNF placement.        Objective     VITALS  Blood pressure 105/69, pulse 101, temperature 36.9 °C (98.5 °F), temperature source Temporal, resp. rate 18, height 1.651 m (5' 5\"), weight 58.5 kg (128 lb 15.5 oz), SpO2 100%.  Physical Exam  Constitutional:       Appearance: Normal appearance.   Cardiovascular:      Rate and Rhythm: Normal rate and regular rhythm.   Pulmonary:      Effort: Pulmonary effort is normal.      Breath sounds: Normal breath sounds.   Abdominal:      General: Abdomen is flat. Bowel sounds are normal.      Palpations: Abdomen is soft.   Musculoskeletal:      Cervical back: Normal range of motion.   Skin:     General: Skin is warm.   Neurological:      General: No focal deficit present.      Mental Status: She is alert and oriented to person, place, and time. Mental status is at baseline.   Psychiatric:         Mood and Affect: Mood normal.         Behavior: Behavior normal.         Thought Content: Thought content normal.         Judgment: Judgment normal.           Intake/Output last 3 Shifts:  No intake/output data recorded.    Relevant Results  Results for orders placed or performed during the hospital encounter of 05/23/24 (from the past 24 hour(s))   CBC   Result Value Ref Range    WBC 6.9 4.4 - 11.3 x10*3/uL    nRBC 0.3 (H) 0.0 - 0.0 /100 WBCs    RBC 2.76 (L) 4.00 - 5.20 x10*6/uL    Hemoglobin 9.9 (L) 12.0 - 16.0 g/dL    Hematocrit 27.2 (L) 36.0 - 46.0 %    MCV 99 80 - 100 fL    MCH 35.9 (H) 26.0 - 34.0 pg    MCHC 36.4 (H) 32.0 - 36.0 g/dL    RDW 13.8 11.5 - 14.5 %    Platelets 92 (L) 150 - 450 x10*3/uL   Comprehensive metabolic panel   Result Value Ref Range    Glucose 87 74 - 99 mg/dL    Sodium 137 136 - 145 mmol/L    Potassium 3.2 (L) 3.5 " - 5.3 mmol/L    Chloride 101 98 - 107 mmol/L    Bicarbonate 29 21 - 32 mmol/L    Anion Gap 10 10 - 20 mmol/L    Urea Nitrogen 13 6 - 23 mg/dL    Creatinine 0.43 (L) 0.50 - 1.05 mg/dL    eGFR >90 >60 mL/min/1.73m*2    Calcium 7.8 (L) 8.6 - 10.3 mg/dL    Albumin 3.0 (L) 3.4 - 5.0 g/dL    Alkaline Phosphatase 293 (H) 33 - 136 U/L    Total Protein 4.9 (L) 6.4 - 8.2 g/dL    AST 74 (H) 9 - 39 U/L    Bilirubin, Total 1.6 (H) 0.0 - 1.2 mg/dL    ALT 38 7 - 45 U/L       Imaging Results  CT angio chest for pulmonary embolism   Final Result   No evidence of pulmonary embolism.        Right upper lobe 3 mm pulmonary nodules. No further follow-up is   required, however, if the patient has high risk factors for primary   lung malignancy, follow-up noncontrast CT scan chest in 12 months may   be obtained. (Sandro Bass et al., Guidelines for management of   incidental pulmonary nodules detected on CT images: From the   Fleischner Society 2017, Radiology. 2017 Jul;284 (1):228-243.)        Atherosclerosis and coronary artery calcifications.        Signed by: Catarino Parra 5/23/2024 5:59 PM   Dictation workstation:   [id number]      US gallbladder   Final Result   1. Fatty infiltration of the liver. This can cause abnormal liver   function tests.   2.        MACRO:   None        Signed by: Kym Verma 5/23/2024 2:59 PM   Dictation workstation:   [id number]      XR chest 1 view   Final Result   1.  No evidence of acute cardiopulmonary process.             Signed by: Mike Masters 5/23/2024 12:24 PM   Dictation workstation:   [id number]          Medications:  budesonide, 0.5 mg, nebulization, BID  [Held by provider] enoxaparin, 40 mg, subcutaneous, q24h  folic acid, 1 mg, oral, Daily  formoterol, 20 mcg, nebulization, BID  ipratropium-albuteroL, 3 mL, nebulization, TID  multivitamin with minerals, 1 tablet, oral, Daily  pantoprazole, 40 mg, oral, Daily before breakfast   Or  pantoprazole, 40 mg, intravenous, Daily before  breakfast  potassium chloride CR, 20 mEq, oral, Daily  thiamine, 100 mg, oral, Daily       PRN medications: acetaminophen **OR** acetaminophen **OR** acetaminophen, albuterol, melatonin, ondansetron ODT **OR** ondansetron, oxygen        Assessment/Plan   Principal Problem:    General weakness  Active Problems:    Alcohol abuse    Hypokalemia    Hypomagnesemia    Tachycardia    Unable to ambulate    Failure to thrive in adult    Hypoxia    Elevated troponin    Elevated liver enzymes    COPD exacerbation (Multi)    Hyponatremia    Rash    Generalized weakness    Etohism  -continue to monitor for withdrawal though is likely out of the window     HypoK  -replace and monitor    HypoMg  -replace    Elevated bili, LFTs, thrombocytopenia  -from fatty liver + etohism  -f/up GI. Counseled for etoh cessation, folate, thiamine.   -Will likely need to follow up outpatient with hepatology     Aecopd r/o  -no bronchospasm today, no lung infiltrates, on room air  -stop prednisone  -continue maintenance inhalers     Lung nodules  -outpt monitoring, repeat CT 12 months.     PT/ot      DVT Prophylaxis:  Lovenox subq    Disposition:  Likely to SNF     Dick [facility]

## 2024-05-27 ENCOUNTER — HOME HEALTH ADMISSION (OUTPATIENT)
Dept: HOME HEALTH SERVICES | Facility: HOME HEALTH | Age: 74
End: 2024-05-27
Payer: COMMERCIAL

## 2024-05-27 ENCOUNTER — DOCUMENTATION (OUTPATIENT)
Dept: HOME HEALTH SERVICES | Facility: HOME HEALTH | Age: 74
End: 2024-05-27
Payer: COMMERCIAL

## 2024-05-27 VITALS
BODY MASS INDEX: 21.49 KG/M2 | SYSTOLIC BLOOD PRESSURE: 106 MMHG | RESPIRATION RATE: 16 BRPM | OXYGEN SATURATION: 99 % | WEIGHT: 128.97 LBS | HEART RATE: 97 BPM | DIASTOLIC BLOOD PRESSURE: 62 MMHG | HEIGHT: 65 IN | TEMPERATURE: 98.2 F

## 2024-05-27 PROCEDURE — 94664 DEMO&/EVAL PT USE INHALER: CPT

## 2024-05-27 PROCEDURE — 2500000002 HC RX 250 W HCPCS SELF ADMINISTERED DRUGS (ALT 637 FOR MEDICARE OP, ALT 636 FOR OP/ED): Performed by: PEDIATRICS

## 2024-05-27 PROCEDURE — 94640 AIRWAY INHALATION TREATMENT: CPT

## 2024-05-27 PROCEDURE — 2500000001 HC RX 250 WO HCPCS SELF ADMINISTERED DRUGS (ALT 637 FOR MEDICARE OP): Performed by: PHYSICIAN ASSISTANT

## 2024-05-27 PROCEDURE — 2500000002 HC RX 250 W HCPCS SELF ADMINISTERED DRUGS (ALT 637 FOR MEDICARE OP, ALT 636 FOR OP/ED): Performed by: GENERAL PRACTICE

## 2024-05-27 PROCEDURE — 2500000002 HC RX 250 W HCPCS SELF ADMINISTERED DRUGS (ALT 637 FOR MEDICARE OP, ALT 636 FOR OP/ED): Performed by: INTERNAL MEDICINE

## 2024-05-27 RX ADMIN — FORMOTEROL FUMARATE DIHYDRATE 20 MCG: 20 SOLUTION RESPIRATORY (INHALATION) at 07:39

## 2024-05-27 RX ADMIN — FOLIC ACID 1 MG: 1 TABLET ORAL at 09:09

## 2024-05-27 RX ADMIN — THIAMINE HCL TAB 100 MG 100 MG: 100 TAB at 09:09

## 2024-05-27 RX ADMIN — PANTOPRAZOLE SODIUM 40 MG: 40 TABLET, DELAYED RELEASE ORAL at 09:10

## 2024-05-27 RX ADMIN — POTASSIUM CHLORIDE 20 MEQ: 1500 TABLET, EXTENDED RELEASE ORAL at 09:10

## 2024-05-27 RX ADMIN — IPRATROPIUM BROMIDE AND ALBUTEROL SULFATE 3 ML: 2.5; .5 SOLUTION RESPIRATORY (INHALATION) at 07:39

## 2024-05-27 RX ADMIN — Medication 1 TABLET: at 09:09

## 2024-05-27 RX ADMIN — BUDESONIDE INHALATION 0.5 MG: 0.5 SUSPENSION RESPIRATORY (INHALATION) at 07:39

## 2024-05-27 RX ADMIN — IPRATROPIUM BROMIDE AND ALBUTEROL SULFATE 3 ML: 2.5; .5 SOLUTION RESPIRATORY (INHALATION) at 12:58

## 2024-05-27 ASSESSMENT — COGNITIVE AND FUNCTIONAL STATUS - GENERAL
DAILY ACTIVITIY SCORE: 18
MOVING TO AND FROM BED TO CHAIR: A LITTLE
TOILETING: A LITTLE
DRESSING REGULAR UPPER BODY CLOTHING: A LITTLE
WALKING IN HOSPITAL ROOM: A LITTLE
CLIMB 3 TO 5 STEPS WITH RAILING: A LITTLE
STANDING UP FROM CHAIR USING ARMS: A LITTLE
DRESSING REGULAR LOWER BODY CLOTHING: A LITTLE
EATING MEALS: A LITTLE
CLIMB 3 TO 5 STEPS WITH RAILING: A LITTLE
HELP NEEDED FOR BATHING: A LITTLE
MOBILITY SCORE: 20
HELP NEEDED FOR BATHING: A LITTLE
PERSONAL GROOMING: A LITTLE
MOBILITY SCORE: 20
WALKING IN HOSPITAL ROOM: A LITTLE
DRESSING REGULAR UPPER BODY CLOTHING: A LITTLE
DRESSING REGULAR LOWER BODY CLOTHING: A LITTLE
STANDING UP FROM CHAIR USING ARMS: A LITTLE
EATING MEALS: A LITTLE
DAILY ACTIVITIY SCORE: 18
TOILETING: A LITTLE
MOVING TO AND FROM BED TO CHAIR: A LITTLE
PERSONAL GROOMING: A LITTLE

## 2024-05-27 NOTE — PROGRESS NOTES
05/27/24 1031   Discharge Planning   Patient expects to be discharged to: qualifies for [facility] only, asked md to put in for [facility]. Had long discussion with family member gregg, who is a nurse. she is aware of the [facility] rec; and states family will all have to help her, aware of daily etoh use. only barrier- ? low bp? asking goudiaby     Svitlana Perry is a 74 y.o. female on day 4 of admission presenting with General weakness.    Beth Gunderson RN

## 2024-05-27 NOTE — DISCHARGE SUMMARY
Discharge Diagnosis  General weakness    Issues Requiring Follow-Up  PCP and GI    Discharge Meds     Your medication list        CONTINUE taking these medications        Instructions Last Dose Given Next Dose Due   albuterol 90 mcg/actuation inhaler           fluticasone propion-salmeteroL 250-50 mcg/dose diskus inhaler  Commonly known as: Advair Diskus      Inhale 1 puff 2 times a day. Rinse mouth with water after use to reduce aftertaste and incidence of candidiasis. Do not swallow.       ipratropium-albuteroL 0.5-2.5 mg/3 mL nebulizer solution  Commonly known as: Duo-Neb      USE 1 UNIT DOSE VIA NEBULIZER THREE TIMES DAILY.       Trelegy Ellipta 100-62.5-25 mcg blister with device  Generic drug: fluticasone-umeclidin-vilanter      Inhale 1 puff once daily.              STOP taking these medications      diclofenac sodium 1 % gel  Commonly known as: Voltaren        fluticasone 220 mcg/actuation inhaler  Commonly known as: Flovent                  Where to Get Your Medications        These medications were sent to [facility]/pharmacy [address] [place] [address] AT [place] [address]      Phone: [phone]   Trelegy Ellipta 100-62.5-25 mcg blister with device         Test Results Pending At Discharge  Pending Labs       Order Current Status    PREMA with Reflex to CAYLA In process    Alpha-1 Antitrypsin Phenotype In process    Anti-Mitochondrial Antibody In process    Anti-Smooth Muscle Antibody In process            Hospital Course   Svitlana Perry is a 74 y.o. female presenting with general weakness, unable to ambulate, failure to thrive, hypoxia, tachycardia, COPD exacerbation, hypomagnesia and, hyponatremia, hypokalemia, elevated troponin, elevated liver enzymes.  Patient lives alone and states she has been unable to walk for the last 3 to 5 days because she feels weak.  She was on the couch and a neighbor has been helping her but then she could not  get up at all.  She wears diapers for incontinence that is chronic but has not been able to change her diapers etc.  Either her sister who lives remotely or neighbor called EMS and the patient was brought to the emergency department.  She complains of a rash on her buttocks for a week or more but otherwise has no other complaints.  She states she has not had much appetite but she has been drinking water and her neighbor does bring her food.     Patient was admitted for further management in light of her alcohol withdrawal symptoms, and CIWA protocol was in place her electrolytes were monitored and replenish as needed, correct hypomagnesemia, hyponatremia, hypokalemia.  Patient was monitored for elevated bilirubin level, LFTs, and thrombocytopenia.  Patient also presented with acute exacerbation of her COPD, which was managed with bronchodilators, and corticosteroid, with improvement of her symptoms, and resolution of bronchospasms prior to discharge.  Hospital course was stable, no significant symptom of alcohol withdrawal were noted, COPD exacerbation was resolved.  Patient was seen and evaluated by PT OT in light of her weakness, and recommendation was for low intensity continue care with recommendation for Dr. Gonzalez discharge.  Discharge planning involved care coordinator, who conferenced with family members 1 of which is an RN, with agreement to discharge home with home health care.  Patient was discharged home stable condition, with home health orders in place, and she is to follow-up with GI and PCP.    Greater than 30 minutes were dedicated to this discharge.    Pertinent Physical Exam At Time of Discharge  Physical Exam  Constitutional: Alert active, cooperative not in acute distress  Eyes: PERRLA, clear sclera  ENMT: Moist mucosal membranes, no exudate  Head / Neck: Atraumatic, normocephalic, supple neck, JVP not visualized  Lungs: Patent airways, CTABL  Heart: RRR, S1S2, no murmurs appreciated, palpable  pulses in all extremities  GI: Soft, NT, ND, bowel sounds present in all quadrants  MSK: Moves all extremities freely, no restriction  of ROM, no joint edema  Extremities: Intact x 4, no peripheral edema  : No Pal catheter inserted  Breast: Deferred  Neurological: AAO x 3 to person, place and date, facial muscles symmetrical, sensation intact, strength 4/4, no acute focal neurological deficits appreciated  Psychological: Appropriate mood and behavior    Outpatient Follow-Up  No future appointments.      Nic Ortiz DO

## 2024-05-27 NOTE — HH CARE COORDINATION
[facility] 2. ADOD 5.27.2026. Dr. Demarco Gonzalez TO FOLLOW.    QUESTIONNAIRE: COMPLETE  HH INTAKE: COMPLETE   READY TO COMPLETE INTAKE AND CARE COORDINATION NOTE ONCE INS VERIF

## 2024-05-27 NOTE — CARE PLAN
The patient's goals for the shift include Patient will remain free from pain this shift    The clinical goals for the shift include free of falls by end of shift    Problem: Pain - Adult  Goal: Verbalizes/displays adequate comfort level or baseline comfort level  Outcome: Progressing     Problem: Safety - Adult  Goal: Free from fall injury  Outcome: Progressing     Problem: Discharge Planning  Goal: Discharge to home or other facility with appropriate resources  Outcome: Progressing     Problem: Chronic Conditions and Co-morbidities  Goal: Patient's chronic conditions and co-morbidity symptoms are monitored and maintained or improved  Outcome: Progressing     Problem: Fall/Injury  Goal: Not fall by end of shift  Outcome: Progressing  Goal: Be free from injury by end of the shift  Outcome: Progressing  Goal: Verbalize understanding of personal risk factors for fall in the hospital  Outcome: Progressing  Goal: Verbalize understanding of risk factor reduction measures to prevent injury from fall in the home  Outcome: Progressing  Goal: Use assistive devices by end of the shift  Outcome: Progressing  Goal: Pace activities to prevent fatigue by end of the shift  Outcome: Progressing     Problem: Skin  Goal: Decreased wound size/increased tissue granulation at next dressing change  Outcome: Progressing  Goal: Participates in plan/prevention/treatment measures  Outcome: Progressing  Goal: Prevent/manage excess moisture  Outcome: Progressing  Goal: Prevent/minimize sheer/friction injuries  Outcome: Progressing  Goal: Promote/optimize nutrition  Outcome: Progressing  Goal: Promote skin healing  Outcome: Progressing

## 2024-05-27 NOTE — HH CARE COORDINATION
Home Care received a Referral for Physical Therapy, Occupational Therapy, and Home Health Aide. We have processed the referral for a Start of Care on 5.30.2024 (Per ROMÁN And MD via Secure Chat. This is Okay).     If you have any questions or concerns, please feel free to contact us at [phone]. Follow the prompts, enter your five digit zip code, and you will be directed to your care team on [facility] [facility].

## 2024-05-28 ENCOUNTER — PATIENT OUTREACH (OUTPATIENT)
Dept: CARE COORDINATION | Facility: CLINIC | Age: 74
End: 2024-05-28
Payer: COMMERCIAL

## 2024-05-28 LAB
A1AT PHENOTYP SERPL-IMP: NORMAL
A1AT SERPL-MCNC: 172 MG/DL (ref 90–200)
MITOCHONDRIA AB SER QL IF: NEGATIVE

## 2024-05-28 NOTE — PROGRESS NOTES
Discharge Facility: [facility]  Discharge Diagnosis:Weakness  Admission Date:05/23/24  Discharge Date: 05/27/24    PCP Appointment Date:06/05/24  Specialist Appointment Date:   Hospital Encounter and Summary: Linked   See discharge assessment below for further details  Engagement  Call Start Time: 1142 (5/28/2024 11:42 AM)    Medications  Medications reviewed with patient/caregiver?: Yes (5/28/2024 11:42 AM)  Is the patient having any side effects they believe may be caused by any medication additions or changes?: No (5/28/2024 11:42 AM)  Does the patient have all medications ordered at discharge?: Not applicable (5/28/2024 11:42 AM)    Appointments  Does the patient have a primary care provider?: Yes (5/28/2024 11:42 AM)  Care Management Interventions: Verified appointment date/time/provider (5/28/2024 11:42 AM)    Self Management  Has home health visited the patient within 72 hours of discharge?: Not applicable (5/28/2024 11:42 AM)  Has all Durable Medical Equipment (DME) been delivered?: No (5/28/2024 11:42 AM)    Patient Teaching  Does the patient have access to their discharge instructions?: Yes (5/28/2024 11:42 AM)  Care Management Interventions: Reviewed instructions with patient (5/28/2024 11:42 AM)  What is the patient's perception of their health status since discharge?: Improving (5/28/2024 11:42 AM)    Wrap Up  Call End Time: 1150 (5/28/2024 11:42 AM)

## 2024-05-29 ENCOUNTER — HOSPITAL ENCOUNTER (INPATIENT)
Facility: HOSPITAL | Age: 74
LOS: 3 days | Discharge: HOME | DRG: 309 | End: 2024-06-01
Attending: EMERGENCY MEDICINE | Admitting: STUDENT IN AN ORGANIZED HEALTH CARE EDUCATION/TRAINING PROGRAM
Payer: COMMERCIAL

## 2024-05-29 ENCOUNTER — APPOINTMENT (OUTPATIENT)
Dept: CARDIOLOGY | Facility: HOSPITAL | Age: 74
DRG: 309 | End: 2024-05-29
Payer: COMMERCIAL

## 2024-05-29 ENCOUNTER — APPOINTMENT (OUTPATIENT)
Dept: RADIOLOGY | Facility: HOSPITAL | Age: 74
DRG: 309 | End: 2024-05-29
Payer: COMMERCIAL

## 2024-05-29 DIAGNOSIS — E83.42 HYPOMAGNESEMIA: ICD-10-CM

## 2024-05-29 DIAGNOSIS — A41.9 SEPSIS, DUE TO UNSPECIFIED ORGANISM, UNSPECIFIED WHETHER ACUTE ORGAN DYSFUNCTION PRESENT (MULTI): ICD-10-CM

## 2024-05-29 DIAGNOSIS — A49.9 UTI (URINARY TRACT INFECTION), BACTERIAL: ICD-10-CM

## 2024-05-29 DIAGNOSIS — I45.9 CARDIAC CONDUCTION DISORDER: ICD-10-CM

## 2024-05-29 DIAGNOSIS — R55 SYNCOPE AND COLLAPSE: ICD-10-CM

## 2024-05-29 DIAGNOSIS — R79.89 ELEVATED TROPONIN: ICD-10-CM

## 2024-05-29 DIAGNOSIS — I48.0 PAROXYSMAL ATRIAL FIBRILLATION (MULTI): ICD-10-CM

## 2024-05-29 DIAGNOSIS — E87.6 HYPOKALEMIA: ICD-10-CM

## 2024-05-29 DIAGNOSIS — I95.9 HYPOTENSION, UNSPECIFIED HYPOTENSION TYPE: ICD-10-CM

## 2024-05-29 DIAGNOSIS — Z87.81 PERSONAL HISTORY OF (HEALED) TRAUMATIC FRACTURE: ICD-10-CM

## 2024-05-29 DIAGNOSIS — I48.91 ATRIAL FIBRILLATION WITH RVR (MULTI): Primary | ICD-10-CM

## 2024-05-29 DIAGNOSIS — R55 NEAR SYNCOPE: ICD-10-CM

## 2024-05-29 DIAGNOSIS — R53.1 GENERALIZED WEAKNESS: ICD-10-CM

## 2024-05-29 DIAGNOSIS — N39.0 UTI (URINARY TRACT INFECTION), BACTERIAL: ICD-10-CM

## 2024-05-29 LAB
ALBUMIN SERPL BCP-MCNC: 3.4 G/DL (ref 3.4–5)
ALP SERPL-CCNC: 269 U/L (ref 33–136)
ALT SERPL W P-5'-P-CCNC: 35 U/L (ref 7–45)
ANA PATTERN 2: ABNORMAL
ANA PATTERN: ABNORMAL
ANA SER QL HEP2 SUBST: POSITIVE
ANA TITER 2: ABNORMAL
ANA TITR SER IF: ABNORMAL {TITER}
ANION GAP BLDV CALCULATED.4IONS-SCNC: 9 MMOL/L (ref 10–25)
ANION GAP SERPL CALC-SCNC: 13 MMOL/L (ref 10–20)
APPEARANCE UR: ABNORMAL
AST SERPL W P-5'-P-CCNC: 40 U/L (ref 9–39)
ATRIAL RATE: 106 BPM
BACTERIA #/AREA URNS AUTO: ABNORMAL /HPF
BASE EXCESS BLDV CALC-SCNC: 1.4 MMOL/L (ref -2–3)
BASOPHILS # BLD AUTO: 0.06 X10*3/UL (ref 0–0.1)
BASOPHILS NFR BLD AUTO: 0.5 %
BILIRUB SERPL-MCNC: 2 MG/DL (ref 0–1.2)
BILIRUB UR STRIP.AUTO-MCNC: NEGATIVE MG/DL
BODY TEMPERATURE: 37 DEGREES CELSIUS
BUN SERPL-MCNC: 21 MG/DL (ref 6–23)
CA-I BLDV-SCNC: 1.2 MMOL/L (ref 1.1–1.33)
CALCIUM SERPL-MCNC: 8.5 MG/DL (ref 8.6–10.3)
CARDIAC TROPONIN I PNL SERPL HS: 209 NG/L (ref 0–13)
CARDIAC TROPONIN I PNL SERPL HS: 263 NG/L (ref 0–13)
CENTROMERE B AB SER-ACNC: <0.2 AI
CHLORIDE BLDV-SCNC: 100 MMOL/L (ref 98–107)
CHLORIDE SERPL-SCNC: 97 MMOL/L (ref 98–107)
CHROMATIN AB SERPL-ACNC: <0.2 AI
CO2 SERPL-SCNC: 27 MMOL/L (ref 21–32)
COLOR UR: YELLOW
CREAT SERPL-MCNC: 0.79 MG/DL (ref 0.5–1.05)
DSDNA AB SER-ACNC: <1 IU/ML
EGFRCR SERPLBLD CKD-EPI 2021: 80 ML/MIN/1.73M*2
ENA JO1 AB SER QL IA: <0.2 AI
ENA RNP AB SER IA-ACNC: <0.2 AI
ENA SCL70 AB SER QL IA: <0.2 AI
ENA SM AB SER IA-ACNC: <0.2 AI
ENA SM+RNP AB SER QL IA: <0.2 AI
ENA SS-A AB SER IA-ACNC: <0.2 AI
ENA SS-B AB SER IA-ACNC: <0.2 AI
EOSINOPHIL # BLD AUTO: 0.05 X10*3/UL (ref 0–0.4)
EOSINOPHIL NFR BLD AUTO: 0.4 %
ERYTHROCYTE [DISTWIDTH] IN BLOOD BY AUTOMATED COUNT: 15.3 % (ref 11.5–14.5)
ETHANOL SERPL-MCNC: <10 MG/DL
GLUCOSE BLDV-MCNC: 122 MG/DL (ref 74–99)
GLUCOSE SERPL-MCNC: 126 MG/DL (ref 74–99)
GLUCOSE UR STRIP.AUTO-MCNC: NORMAL MG/DL
HCO3 BLDV-SCNC: 26.6 MMOL/L (ref 22–26)
HCT VFR BLD AUTO: 28.2 % (ref 36–46)
HCT VFR BLD EST: 26 % (ref 36–46)
HGB BLD-MCNC: 9.8 G/DL (ref 12–16)
HGB BLDV-MCNC: 8.8 G/DL (ref 12–16)
IMM GRANULOCYTES # BLD AUTO: 0.16 X10*3/UL (ref 0–0.5)
IMM GRANULOCYTES NFR BLD AUTO: 1.2 % (ref 0–0.9)
INHALED O2 CONCENTRATION: 21 %
INR PPP: 1.2 (ref 0.9–1.1)
KETONES UR STRIP.AUTO-MCNC: NEGATIVE MG/DL
LACTATE BLDV-SCNC: 2 MMOL/L (ref 0.4–2)
LEUKOCYTE ESTERASE UR QL STRIP.AUTO: ABNORMAL
LYMPHOCYTES # BLD AUTO: 1.45 X10*3/UL (ref 0.8–3)
LYMPHOCYTES NFR BLD AUTO: 11 %
MAGNESIUM SERPL-MCNC: 1.1 MG/DL (ref 1.6–2.4)
MCH RBC QN AUTO: 35.9 PG (ref 26–34)
MCHC RBC AUTO-ENTMCNC: 34.8 G/DL (ref 32–36)
MCV RBC AUTO: 103 FL (ref 80–100)
MONOCYTES # BLD AUTO: 1.2 X10*3/UL (ref 0.05–0.8)
MONOCYTES NFR BLD AUTO: 9.1 %
MUCOUS THREADS #/AREA URNS AUTO: ABNORMAL /LPF
NEUTROPHILS # BLD AUTO: 10.32 X10*3/UL (ref 1.6–5.5)
NEUTROPHILS NFR BLD AUTO: 77.8 %
NITRITE UR QL STRIP.AUTO: NEGATIVE
NRBC BLD-RTO: 0 /100 WBCS (ref 0–0)
OXYHGB MFR BLDV: 20.5 % (ref 45–75)
P AXIS: 82 DEGREES
P OFFSET: 209 MS
P ONSET: 155 MS
PCO2 BLDV: 44 MM HG (ref 41–51)
PH BLDV: 7.39 PH (ref 7.33–7.43)
PH UR STRIP.AUTO: 6.5 [PH]
PLATELET # BLD AUTO: 192 X10*3/UL (ref 150–450)
PO2 BLDV: 21 MM HG (ref 35–45)
POTASSIUM BLDV-SCNC: 3.2 MMOL/L (ref 3.5–5.3)
POTASSIUM SERPL-SCNC: 3.3 MMOL/L (ref 3.5–5.3)
PR INTERVAL: 140 MS
PROT SERPL-MCNC: 6 G/DL (ref 6.4–8.2)
PROT UR STRIP.AUTO-MCNC: ABNORMAL MG/DL
PROTHROMBIN TIME: 13.3 SECONDS (ref 9.8–12.8)
Q ONSET: 225 MS
Q ONSET: 225 MS
QRS COUNT: 18 BEATS
QRS COUNT: 28 BEATS
QRS DURATION: 64 MS
QRS DURATION: 68 MS
QT INTERVAL: 246 MS
QT INTERVAL: 306 MS
QTC CALCULATION(BAZETT): 406 MS
QTC CALCULATION(BAZETT): 410 MS
QTC FREDERICIA: 346 MS
QTC FREDERICIA: 369 MS
R AXIS: 71 DEGREES
R AXIS: 86 DEGREES
RBC # BLD AUTO: 2.73 X10*6/UL (ref 4–5.2)
RBC # UR STRIP.AUTO: NEGATIVE /UL
RBC #/AREA URNS AUTO: ABNORMAL /HPF
RIBOSOMAL P AB SER-ACNC: <0.2 AI
SAO2 % BLDV: 21 % (ref 45–75)
SMOOTH MUSCLE AB SER QL IF: ABNORMAL
SODIUM BLDV-SCNC: 132 MMOL/L (ref 136–145)
SODIUM SERPL-SCNC: 134 MMOL/L (ref 136–145)
SP GR UR STRIP.AUTO: 1.01
SQUAMOUS #/AREA URNS AUTO: ABNORMAL /HPF
T AXIS: 250 DEGREES
T AXIS: 92 DEGREES
T OFFSET: 348 MS
T OFFSET: 378 MS
UFH PPP CHRO-ACNC: 0.2 IU/ML
UFH PPP CHRO-ACNC: 1.1 IU/ML
UROBILINOGEN UR STRIP.AUTO-MCNC: ABNORMAL MG/DL
VENTRICULAR RATE: 106 BPM
VENTRICULAR RATE: 167 BPM
WBC # BLD AUTO: 13.2 X10*3/UL (ref 4.4–11.3)
WBC #/AREA URNS AUTO: ABNORMAL /HPF

## 2024-05-29 PROCEDURE — 72170 X-RAY EXAM OF PELVIS: CPT

## 2024-05-29 PROCEDURE — 36415 COLL VENOUS BLD VENIPUNCTURE: CPT | Performed by: EMERGENCY MEDICINE

## 2024-05-29 PROCEDURE — 83735 ASSAY OF MAGNESIUM: CPT | Performed by: EMERGENCY MEDICINE

## 2024-05-29 PROCEDURE — 72170 X-RAY EXAM OF PELVIS: CPT | Mod: FOREIGN READ | Performed by: RADIOLOGY

## 2024-05-29 PROCEDURE — 71045 X-RAY EXAM CHEST 1 VIEW: CPT | Mod: FOREIGN READ | Performed by: RADIOLOGY

## 2024-05-29 PROCEDURE — 84075 ASSAY ALKALINE PHOSPHATASE: CPT | Performed by: EMERGENCY MEDICINE

## 2024-05-29 PROCEDURE — 2500000004 HC RX 250 GENERAL PHARMACY W/ HCPCS (ALT 636 FOR OP/ED): Performed by: EMERGENCY MEDICINE

## 2024-05-29 PROCEDURE — 96374 THER/PROPH/DIAG INJ IV PUSH: CPT | Mod: 59,MUE

## 2024-05-29 PROCEDURE — 85520 HEPARIN ASSAY: CPT | Performed by: NURSE PRACTITIONER

## 2024-05-29 PROCEDURE — 87086 URINE CULTURE/COLONY COUNT: CPT | Mod: AHULAB | Performed by: EMERGENCY MEDICINE

## 2024-05-29 PROCEDURE — 2060000001 HC INTERMEDIATE ICU ROOM DAILY

## 2024-05-29 PROCEDURE — 99223 1ST HOSP IP/OBS HIGH 75: CPT | Performed by: NURSE PRACTITIONER

## 2024-05-29 PROCEDURE — 99223 1ST HOSP IP/OBS HIGH 75: CPT | Performed by: INTERNAL MEDICINE

## 2024-05-29 PROCEDURE — 87040 BLOOD CULTURE FOR BACTERIA: CPT | Mod: 91,AHULAB | Performed by: EMERGENCY MEDICINE

## 2024-05-29 PROCEDURE — 82330 ASSAY OF CALCIUM: CPT | Performed by: EMERGENCY MEDICINE

## 2024-05-29 PROCEDURE — 70450 CT HEAD/BRAIN W/O DYE: CPT

## 2024-05-29 PROCEDURE — G0378 HOSPITAL OBSERVATION PER HR: HCPCS

## 2024-05-29 PROCEDURE — 84484 ASSAY OF TROPONIN QUANT: CPT | Performed by: EMERGENCY MEDICINE

## 2024-05-29 PROCEDURE — 72125 CT NECK SPINE W/O DYE: CPT

## 2024-05-29 PROCEDURE — 99291 CRITICAL CARE FIRST HOUR: CPT | Performed by: EMERGENCY MEDICINE

## 2024-05-29 PROCEDURE — 2500000005 HC RX 250 GENERAL PHARMACY W/O HCPCS

## 2024-05-29 PROCEDURE — 70450 CT HEAD/BRAIN W/O DYE: CPT | Performed by: RADIOLOGY

## 2024-05-29 PROCEDURE — 96361 HYDRATE IV INFUSION ADD-ON: CPT | Mod: 59

## 2024-05-29 PROCEDURE — 96365 THER/PROPH/DIAG IV INF INIT: CPT | Mod: 59

## 2024-05-29 PROCEDURE — 85610 PROTHROMBIN TIME: CPT | Performed by: EMERGENCY MEDICINE

## 2024-05-29 PROCEDURE — 81001 URINALYSIS AUTO W/SCOPE: CPT | Performed by: EMERGENCY MEDICINE

## 2024-05-29 PROCEDURE — 85025 COMPLETE CBC W/AUTO DIFF WBC: CPT | Performed by: EMERGENCY MEDICINE

## 2024-05-29 PROCEDURE — 2500000002 HC RX 250 W HCPCS SELF ADMINISTERED DRUGS (ALT 637 FOR MEDICARE OP, ALT 636 FOR OP/ED): Mod: MUE | Performed by: EMERGENCY MEDICINE

## 2024-05-29 PROCEDURE — 2500000005 HC RX 250 GENERAL PHARMACY W/O HCPCS: Performed by: EMERGENCY MEDICINE

## 2024-05-29 PROCEDURE — 99291 CRITICAL CARE FIRST HOUR: CPT | Performed by: SURGERY

## 2024-05-29 PROCEDURE — 2500000001 HC RX 250 WO HCPCS SELF ADMINISTERED DRUGS (ALT 637 FOR MEDICARE OP): Performed by: EMERGENCY MEDICINE

## 2024-05-29 PROCEDURE — 92960 CARDIOVERSION ELECTRIC EXT: CPT

## 2024-05-29 PROCEDURE — 96375 TX/PRO/DX INJ NEW DRUG ADDON: CPT | Mod: 59

## 2024-05-29 PROCEDURE — 96367 TX/PROPH/DG ADDL SEQ IV INF: CPT | Mod: 59

## 2024-05-29 PROCEDURE — 93005 ELECTROCARDIOGRAM TRACING: CPT

## 2024-05-29 PROCEDURE — 71045 X-RAY EXAM CHEST 1 VIEW: CPT

## 2024-05-29 PROCEDURE — 99285 EMERGENCY DEPT VISIT HI MDM: CPT | Mod: 25

## 2024-05-29 PROCEDURE — 72125 CT NECK SPINE W/O DYE: CPT | Performed by: RADIOLOGY

## 2024-05-29 PROCEDURE — 82077 ASSAY SPEC XCP UR&BREATH IA: CPT | Performed by: EMERGENCY MEDICINE

## 2024-05-29 PROCEDURE — 96376 TX/PRO/DX INJ SAME DRUG ADON: CPT | Mod: 59

## 2024-05-29 RX ORDER — IPRATROPIUM BROMIDE AND ALBUTEROL SULFATE 2.5; .5 MG/3ML; MG/3ML
3 SOLUTION RESPIRATORY (INHALATION) EVERY 6 HOURS PRN
Status: DISCONTINUED | OUTPATIENT
Start: 2024-05-29 | End: 2024-05-29

## 2024-05-29 RX ORDER — METOPROLOL TARTRATE 1 MG/ML
2.5 INJECTION, SOLUTION INTRAVENOUS ONCE
Status: DISCONTINUED | OUTPATIENT
Start: 2024-05-29 | End: 2024-05-29

## 2024-05-29 RX ORDER — METOPROLOL TARTRATE 1 MG/ML
5 INJECTION, SOLUTION INTRAVENOUS EVERY 20 MIN
Status: COMPLETED | OUTPATIENT
Start: 2024-05-29 | End: 2024-05-29

## 2024-05-29 RX ORDER — FOLIC ACID 1 MG/1
1 TABLET ORAL DAILY
Status: DISCONTINUED | OUTPATIENT
Start: 2024-05-29 | End: 2024-06-01 | Stop reason: HOSPADM

## 2024-05-29 RX ORDER — ETOMIDATE 2 MG/ML
5 INJECTION INTRAVENOUS ONCE
Status: COMPLETED | OUTPATIENT
Start: 2024-05-29 | End: 2024-05-29

## 2024-05-29 RX ORDER — THIAMINE HYDROCHLORIDE 100 MG/ML
100 INJECTION, SOLUTION INTRAMUSCULAR; INTRAVENOUS DAILY
Status: COMPLETED | OUTPATIENT
Start: 2024-05-29 | End: 2024-05-31

## 2024-05-29 RX ORDER — ETOMIDATE 2 MG/ML
INJECTION INTRAVENOUS CODE/TRAUMA/SEDATION MEDICATION
Status: COMPLETED | OUTPATIENT
Start: 2024-05-29 | End: 2024-05-29

## 2024-05-29 RX ORDER — METOPROLOL TARTRATE 1 MG/ML
INJECTION, SOLUTION INTRAVENOUS
Status: COMPLETED
Start: 2024-05-29 | End: 2024-05-29

## 2024-05-29 RX ORDER — FLUTICASONE FUROATE AND VILANTEROL 100; 25 UG/1; UG/1
1 POWDER RESPIRATORY (INHALATION)
Status: DISCONTINUED | OUTPATIENT
Start: 2024-05-29 | End: 2024-06-01 | Stop reason: HOSPADM

## 2024-05-29 RX ORDER — LANOLIN ALCOHOL/MO/W.PET/CERES
100 CREAM (GRAM) TOPICAL DAILY
Status: DISCONTINUED | OUTPATIENT
Start: 2024-06-01 | End: 2024-06-01 | Stop reason: HOSPADM

## 2024-05-29 RX ORDER — METOPROLOL TARTRATE 1 MG/ML
5 INJECTION, SOLUTION INTRAVENOUS ONCE
Status: DISCONTINUED | OUTPATIENT
Start: 2024-05-29 | End: 2024-05-29

## 2024-05-29 RX ORDER — ONDANSETRON HYDROCHLORIDE 2 MG/ML
4 INJECTION, SOLUTION INTRAVENOUS ONCE
Status: COMPLETED | OUTPATIENT
Start: 2024-05-29 | End: 2024-05-29

## 2024-05-29 RX ORDER — CEFTRIAXONE 1 G/50ML
1 INJECTION, SOLUTION INTRAVENOUS EVERY 24 HOURS
Status: DISCONTINUED | OUTPATIENT
Start: 2024-05-30 | End: 2024-06-01 | Stop reason: HOSPADM

## 2024-05-29 RX ORDER — IPRATROPIUM BROMIDE AND ALBUTEROL SULFATE 2.5; .5 MG/3ML; MG/3ML
3 SOLUTION RESPIRATORY (INHALATION) EVERY 2 HOUR PRN
Status: DISCONTINUED | OUTPATIENT
Start: 2024-05-29 | End: 2024-06-01 | Stop reason: HOSPADM

## 2024-05-29 RX ORDER — CEFTRIAXONE 1 G/50ML
1 INJECTION, SOLUTION INTRAVENOUS EVERY 24 HOURS
Status: DISCONTINUED | OUTPATIENT
Start: 2024-05-30 | End: 2024-05-29

## 2024-05-29 RX ORDER — MULTIVIT-MIN/IRON FUM/FOLIC AC 7.5 MG-4
1 TABLET ORAL DAILY
Status: DISCONTINUED | OUTPATIENT
Start: 2024-05-29 | End: 2024-06-01 | Stop reason: HOSPADM

## 2024-05-29 RX ORDER — FENTANYL CITRATE 50 UG/ML
50 INJECTION, SOLUTION INTRAMUSCULAR; INTRAVENOUS ONCE
Status: COMPLETED | OUTPATIENT
Start: 2024-05-29 | End: 2024-05-29

## 2024-05-29 RX ORDER — POTASSIUM CHLORIDE 20 MEQ/1
40 TABLET, EXTENDED RELEASE ORAL ONCE
Status: COMPLETED | OUTPATIENT
Start: 2024-05-29 | End: 2024-05-29

## 2024-05-29 RX ORDER — FENTANYL CITRATE 50 UG/ML
INJECTION, SOLUTION INTRAMUSCULAR; INTRAVENOUS CODE/TRAUMA/SEDATION MEDICATION
Status: COMPLETED | OUTPATIENT
Start: 2024-05-29 | End: 2024-05-29

## 2024-05-29 RX ORDER — MAGNESIUM SULFATE HEPTAHYDRATE 40 MG/ML
4 INJECTION, SOLUTION INTRAVENOUS ONCE
Status: COMPLETED | OUTPATIENT
Start: 2024-05-29 | End: 2024-05-29

## 2024-05-29 RX ORDER — HEPARIN SODIUM 10000 [USP'U]/100ML
0-4500 INJECTION, SOLUTION INTRAVENOUS CONTINUOUS
Status: DISCONTINUED | OUTPATIENT
Start: 2024-05-29 | End: 2024-05-30

## 2024-05-29 RX ORDER — CEFTRIAXONE 1 G/50ML
1 INJECTION, SOLUTION INTRAVENOUS ONCE
Status: COMPLETED | OUTPATIENT
Start: 2024-05-29 | End: 2024-05-29

## 2024-05-29 RX ORDER — METOPROLOL TARTRATE 1 MG/ML
5 INJECTION, SOLUTION INTRAVENOUS ONCE
Status: COMPLETED | OUTPATIENT
Start: 2024-05-29 | End: 2024-05-29

## 2024-05-29 RX ADMIN — HEPARIN SODIUM 1000 UNITS/HR: 10000 INJECTION, SOLUTION INTRAVENOUS at 16:49

## 2024-05-29 RX ADMIN — ETOMIDATE 5 MG: 2 INJECTION, SOLUTION INTRAVENOUS at 15:15

## 2024-05-29 RX ADMIN — SODIUM CHLORIDE 500 ML: 9 INJECTION, SOLUTION INTRAVENOUS at 16:15

## 2024-05-29 RX ADMIN — SODIUM CHLORIDE, POTASSIUM CHLORIDE, SODIUM LACTATE AND CALCIUM CHLORIDE 1000 ML: 600; 310; 30; 20 INJECTION, SOLUTION INTRAVENOUS at 12:59

## 2024-05-29 RX ADMIN — METOPROLOL TARTRATE 2.5 MG: 5 INJECTION INTRAVENOUS at 13:00

## 2024-05-29 RX ADMIN — MAGNESIUM SULFATE IN WATER 2 G: 2 INJECTION, SOLUTION INTRAVENOUS at 15:15

## 2024-05-29 RX ADMIN — METOPROLOL TARTRATE 5 MG: 5 INJECTION INTRAVENOUS at 13:15

## 2024-05-29 RX ADMIN — FENTANYL CITRATE 50 MCG: 50 INJECTION, SOLUTION INTRAMUSCULAR; INTRAVENOUS at 15:15

## 2024-05-29 RX ADMIN — METOPROLOL TARTRATE 2.5 MG: 5 INJECTION INTRAVENOUS at 13:10

## 2024-05-29 RX ADMIN — ONDANSETRON 4 MG: 2 INJECTION INTRAMUSCULAR; INTRAVENOUS at 15:59

## 2024-05-29 RX ADMIN — SODIUM CHLORIDE, POTASSIUM CHLORIDE, SODIUM LACTATE AND CALCIUM CHLORIDE 500 ML: 600; 310; 30; 20 INJECTION, SOLUTION INTRAVENOUS at 14:36

## 2024-05-29 RX ADMIN — METOPROLOL TARTRATE 5 MG: 5 INJECTION INTRAVENOUS at 13:33

## 2024-05-29 RX ADMIN — THIAMINE HYDROCHLORIDE 100 MG: 100 INJECTION, SOLUTION INTRAMUSCULAR; INTRAVENOUS at 16:07

## 2024-05-29 RX ADMIN — MULTIPLE VITAMINS W/ MINERALS TAB 1 TABLET: TAB at 16:07

## 2024-05-29 RX ADMIN — MAGNESIUM SULFATE IN WATER 2 G: 2 INJECTION, SOLUTION INTRAVENOUS at 14:15

## 2024-05-29 RX ADMIN — POTASSIUM CHLORIDE 40 MEQ: 1500 TABLET, EXTENDED RELEASE ORAL at 14:09

## 2024-05-29 RX ADMIN — FOLIC ACID 1 MG: 1 TABLET ORAL at 16:07

## 2024-05-29 RX ADMIN — CEFTRIAXONE SODIUM 1 G: 1 INJECTION, SOLUTION INTRAVENOUS at 16:00

## 2024-05-29 RX ADMIN — AMIODARONE HYDROCHLORIDE 0.5 MG/MIN: 1.8 INJECTION, SOLUTION INTRAVENOUS at 14:59

## 2024-05-29 ASSESSMENT — PAIN SCALES - GENERAL
PAINLEVEL_OUTOF10: 0 - NO PAIN

## 2024-05-29 ASSESSMENT — ENCOUNTER SYMPTOMS
NEUROLOGICAL NEGATIVE: 1
MUSCULOSKELETAL NEGATIVE: 1
PSYCHIATRIC NEGATIVE: 1
GASTROINTESTINAL NEGATIVE: 1
FATIGUE: 1
RESPIRATORY NEGATIVE: 1
ENDOCRINE NEGATIVE: 1
CARDIOVASCULAR NEGATIVE: 1

## 2024-05-29 ASSESSMENT — PAIN - FUNCTIONAL ASSESSMENT: PAIN_FUNCTIONAL_ASSESSMENT: 0-10

## 2024-05-29 ASSESSMENT — ACTIVITIES OF DAILY LIVING (ADL): LACK_OF_TRANSPORTATION: NO

## 2024-05-29 NOTE — PROGRESS NOTES
Transitional Care Coordination Progress Note:  Plan per Medical/Surgical team: treatment of weakness, afib RVR, hyponatremia 134, hypokalemia 3.3, hypomagnesemia 1.1 with IV amiodarone gtt, Iv lopressor, CIWA, cardio consult, CTSCAN & ECHO pending  Status: ED  Payor source: [employer]   Discharge disposition: Home alone [facility]  Potential Barriers: ETOH abuse, trop 263  ADOD: 6/1/2024   BELLA Damon RN, BSN Transitional Care Coordinator ED# [phone]      05/29/24 1444   Discharge Planning   Living Arrangements Alone   Support Systems Children   Assistance Needed ETOH abuse recovery resources   Type of Residence Private residence   Number of Stairs to Enter Residence 4   Number of Stairs Within Residence 0  (trailor)   Home or Post Acute Services Community services;In home services   Type of Home Care Services Home nursing visits;Home OT;Home PT   Patient expects to be discharged to: Home alone [facility]   Does the patient need discharge transport arranged? No   Financial Resource Strain   How hard is it for you to pay for the very basics like food, housing, medical care, and heating? Not hard   Housing Stability   In the last 12 months, was there a time when you were not able to pay the mortgage or rent on time? N   In the last 12 months, how many places have you lived? 1   In the last 12 months, was there a time when you did not have a steady place to sleep or slept in a shelter (including now)? N   Transportation Needs   In the past 12 months, has lack of transportation kept you from medical appointments or from getting medications? no   In the past 12 months, has lack of transportation kept you from meetings, work, or from getting things needed for daily living? No

## 2024-05-29 NOTE — SIGNIFICANT EVENT
05/29/24 1939   Patient Evaluation Program   Pulmonary Status 3   Surgical Status 0   Chest X-Ray 0   Respiratory Pattern 0   Mental Status 0   Breath Sounds 0   Cough 0   Level of Activity 0   Oxygen Required for Sp02 Greater Than or Equal to 92% 0   Respiratory Acuity Score 3   Triage Level Triage - 5, Score of 0-3   Frequency Q2 PRN (Occasional SOB or STEPHENSON,  TRIAGE - 5)

## 2024-05-29 NOTE — PROGRESS NOTES
Home alone [facility]     05/29/24 1444   Current Planned Discharge Disposition   Current Planned Discharge Disposition Home Health

## 2024-05-29 NOTE — ED PROVIDER NOTES
History/Exam limitations: none.   Additional history was obtained from patient and past medical records.          HPI:    Svitlana Perry is a 74 y.o. female PMH COPD, hypertension, hyperlipidemia, alcohol and tobacco use disorder, pancreatitis, PVD, recent hospitalization for generalized fatigue and hypomagnesemia/hypokalemia presenting for evaluation of dizziness, generalized weakness, syncope.  Patient states that yesterday evening she was at home and began feeling dizzy and passed out.  States that she came to shortly thereafter and called EMS and they helped her onto the couch.  States that she felt improved and this morning was feeling extremely dizzy again so presented to the ED.  Denies any headache, vision changes, neck pain, back pain, chest pain, shortness breath, palpitations, abdominal pain, diarrhea, dysuria.  States he feels feels generally weak.  Denies any vision changes.  No double vision.  Believes she likely hit her head when she fell.  Denies any alcohol use since prior to recent hospitalization, reports last drink was 2+ weeks ago.  Denies any tremulousness.         Physical Exam:  ED Triage Vitals [05/29/24 1226]   Temperature Heart Rate Respirations BP   36.8 °C (98.2 °F) 68 18 88/61      Pulse Ox Temp Source Heart Rate Source Patient Position   95 % Oral Monitor Sitting      BP Location FiO2 (%)     Left arm --        GEN:      Alert, NAD  Eyes:       PERRL, EOMI  HENT:      NC/AT, mild tenderness to posterior scalp, midface stable, no visible septal hematoma OP clear, airway patent, MM, no tongue fasciculations  CV:      Tachycardic rate, irregular rhythm, no MRG, no LE pitting edema, 2+ radial and pedal pulses  PULM:     CTAB, no w/r/r, easy WOB, symmetric chest rise  ABD:      Soft, NT, ND, no masses, BS +  :       No CVA TTP  NEURO:   A/Ox4, CN II-XII intact, strength 5/5 in all 4 ext, SILT, FNF normal b/l, no pronator drift  MSK:      FROM, no joint deformities or swelling, pelvis  stable, no extremity tenderness, no midline CT or L-spine tenderness or step-offs  SKIN:       Warm and dry  PSYCH:    Appropriate mood and affect         MDM/ED Course:   Svitlana Perry is a 74 y.o. female PMH COPD, hypertension, hyperlipidemia, alcohol and tobacco use disorder, pancreatitis, PVD, recent hospitalization for generalized fatigue and hypomagnesemia/hypokalemia presenting for evaluation of dizziness, generalized weakness, syncope.  Triage vitals markable for blood pressure 88/61.  Heart rate was 68 however was not measured with telemetry.  On arrival to room, heart rate 160s.  Systolic blood pressure 100s.  Differential includes syncope secondary to vasovagal, orthostatic hypotension, dehydration, arrhythmia, infectious etiology, ACS, stroke. Unlikely ACS given no chest pressure, chest pain, but has multiple risk factors including age and medical diseases. Doubt TIA/stroke given no report of focal neurologic deficits and evidence of this on exam.  Suspect syncope may have been related to A-fib RVR.  Differential includes electrolyte abnormality, underlying infectious process. 70s, systolic blood pressures 90s to 100s, 2.5 mg IV metoprolol given x 2 followed by 5 mg IV metoprolol x 2.  Patient's heart rate improved to 120s-140s from previously 160s-170s.  Potassium 3.2 on venous blood gas, p.o. 40 mEq given.  Patient was found to have a leukocytosis to 13.2 with left shift, hemoglobin 9.8, urinalysis concerning for UTI, patient was given IV ceftriaxone and 30 cc/kg IV fluids.  Blood cultures obtained as part of sepsis protocol.  No signs of trauma from fall with exception of some mild posterior scalp tenderness, differential includes skull fracture, intracranial hemorrhage, C-spine fracture, CT head and C-spine ordered and pending.  Chest and pelvic x-rays obtained and no acute findings per radiology read.  Differential includes a component of alcohol withdrawal however patient denies having had any  alcohol since 2+ weeks ago, was recently in hospital with no signs of withdrawal.  Suspect patient's symptoms/A-fib started last night given she is symptomatic, was just discharged from hospital on 2/27 with no reported atrial fibrillation.    ED Course as of 06/01/24 1547   Wed May 29, 2024   1416 Bedside ultrasound on with normal appearing ejection fraction, IVC plethoric slightly over 2 cm, some respiratory variation slightly less than one half collapse, likely related to rate.  After total of 15 mg IV metoprolol heart rate improved to 120s to 140s from 170s.  Has received 1 L IV fluids.  Cardiology consulted, seeing patient.  Magnesium 1.1, 4 g IV magnesium per cardiology recommendation.  Oral potassium being repleted as well.  Patient is no chest pain, pressure, shortness of breath, troponin elevated at 263, likely demand.  Discuss plan moving forward with cardiology, starting amiodarone.  CT head and CBC prior to heparin administration.  No tremor or tongue fasciculations, low suspicion for withdrawal. [JM]   1520 Just prior to amiodarone infusion being initiated, patient became hypotensive, mental status remained reassuring however blood pressure was 70s over 50s with MAP of 62.  Discussed with cardiology amiodarone initially held and patient was cardioverted successfully with 200 J.  Patient was sedated with etomidate 5 mg and was given 50 mcg fentanyl for pain.  Blood pressure improved to systolics in the 100s.  Repeat EKG pending.  Plan to start amiodarone infusion without bolus and initiate heparin when CT head results. [JM]   1609 Remainder fluid bolus been changed to normal saline due to compatibility issues with ceftriaxone.  Patient has received essentially 30 cc per KG IV fluids, 1600 thus far, giving additional 500 cc, last SBP 100s with MAP in the 70s. [JM]   1659 Blood pressure improved to 113/61.  Heart rate in the 90s.  Patient feeling improved and appears improved.  Troponin downtrending from  263 to 209 ng/L, suspect demand in the setting of A-fib, less likely ACS.  CT brain and C-spine without acute findings, platelet count reassuring, after infusion for new onset A-fib initiated.  Plan for stepdown admission to hospital service. [JM]   1919 Following admission, was notified the patient's MAP dropped to 65.  Patient was sleeping.  Woke patient up and blood pressure improved to 107/79.  No intervention.  Bedside ultrasound displays good cardiac squeeze, no signs of right heart strain.  Considered PE but reassured by lack of right heart strain, normal tricuspid annulus excursion, recent negative CT PE study, lack of chest pain or hypoxia, satting 100 and on room air.  Potential trace pericardial effusion with no signs of tamponade.  IVC 2 cm, approximately 40% collapse with inspiration.  500 cc IV fluids being given.  Medicine team aware, notifying ICU. [JM]   2100 Patient was evaluated by Dr. Garcia from the ICU, recommendation was made for admission to stepdown.  Primary team to be updated. [JM]      ED Course User Index  [JM] Colby Cazares MD         Diagnoses as of 06/01/24 1547   Hypomagnesemia   Hypokalemia   Atrial fibrillation with RVR (Multi)   Syncope and collapse   Hypotension, unspecified hypotension type   UTI (urinary tract infection), bacterial   Sepsis, due to unspecified organism, unspecified whether acute organ dysfunction present (Multi)     EKG reviewed by me: 12:34 PM atrial fibrillation with RVR, rate 67, normal axis, normal QRS and QTc intervals, inferior and lateral ST depressions, no STEMI, A-fib new compared to prior.    EKG reviewed by me: 3:33 PM sinus tachycardia with PVCs, rate 106, normal axis, normal intervals, no STEMI, appropriate discordance, similar to prior EKG from 8/23/2024.    Chronic medical conditions affecting care listed in MDM. I independently reviewed imaging studies and agreed with radiology reads. I reviewed recent and relevant outside records including  PCP notes, Prior discharge summaries, and prior radiology reports.    Procedure  Critical Care    Performed by: Colby Cazares MD  Authorized by: Colby Cazares MD    Critical care provider statement:     Critical care time (minutes):  45    Critical care time was exclusive of:  Separately billable procedures and treating other patients    Critical care was necessary to treat or prevent imminent or life-threatening deterioration of the following conditions:  Cardiac failure and sepsis    Critical care was time spent personally by me on the following activities:  Development of treatment plan with patient or surrogate, discussions with consultants, evaluation of patient's response to treatment, examination of patient, interpretation of cardiac output measurements, obtaining history from patient or surrogate, ordering and performing treatments and interventions, ordering and review of laboratory studies, ordering and review of radiographic studies, pulse oximetry, re-evaluation of patient's condition and review of old charts    Care discussed with: admitting provider    Electrical Cardioversion    Performed by: Colby Cazares MD  Authorized by: Colby Cazares MD    Consent:     Consent obtained:  Emergent situation    Consent given by:  Patient    Risks, benefits, and alternatives were discussed: yes      Risks discussed:  Induced arrhythmia, cutaneous burn and pain  Universal protocol:     Patient identity confirmed:  Verbally with patient and hospital-assigned identification number  Pre-procedure details:     Cardioversion basis:  Emergent    Rhythm:  Atrial fibrillation    Electrode placement:  Anterior-lateral  Attempt one:     Cardioversion mode:  Synchronous    Waveform:  Biphasic    Shock (Joules):  200    Shock outcome:  Conversion to normal sinus rhythm  Post-procedure details:     Patient status:  Awake    Procedure completion:  Tolerated well, no immediate complications  Moderate  Sedation    Performed by: Colby Cazares MD  Authorized by: Colby Cazares MD    Consent:     Consent obtained:  Verbal and emergent situation    Consent given by:  Patient    Risks discussed:  Allergic reaction, prolonged hypoxia resulting in organ damage, prolonged sedation necessitating reversal, dysrhythmia, inadequate sedation and respiratory compromise necessitating ventilatory assistance and intubation    Alternatives discussed:  Analgesia without sedation  Universal protocol:     Procedure explained and questions answered to patient or proxy's satisfaction: yes      Patient identity confirmed:  Verbally with patient and hospital-assigned identification number  Indications:     Procedure performed:  Cardioversion    Procedure necessitating sedation performed by:  Physician performing sedation    Intended level of sedation:  Moderate  Pre-sedation assessment:     Time since last food or drink:  >12H    ASA classification: class 2 - patient with mild systemic disease      Mouth opening:  3 or more finger widths    Mallampati score:  I - soft palate, uvula, fauces, pillars visible    Neck mobility: normal      Pre-sedation assessments completed and reviewed: airway patency    Immediate pre-procedure details:     Reassessment: Patient reassessed immediately prior to procedure      Verified: bag valve mask available, emergency equipment available, intubation equipment available, IV patency confirmed and oxygen available    Procedure details (see MAR for exact dosages):     Preoxygenation:  Nasal cannula    Sedation: 5mg etomidate.    Intra-procedure monitoring:  Blood pressure monitoring, continuous capnometry, frequent LOC assessments, frequent vital sign checks, continuous pulse oximetry and cardiac monitor    Intra-procedure events: none      Total sedation time (minutes):  5  Post-procedure details:     Recovery: Patient returned to pre-procedure baseline      Procedure completion:  Tolerated well, no  immediate complications      Diagnosis:   1.  Atrial fibrillation with RVR  2.  Syncope  3.  Closed head injury  4.  Hypomagnesemia  5.  Hypokalemia  6.  Hypertension  7.  UTI  8.  Sepsis    Dispo: Hospitalized in stable condition      Disclaimer: Portions of this note were dictated by speech recognition. An attempt at proof reading was made to minimize errors. Minor errors in transcription may be present.  Please call if questions.     Colby Cazares MD  06/01/24 9704

## 2024-05-29 NOTE — DISCHARGE INSTR - OTHER ORDERS
Your outpatient resources for alcohol dependence are:   Alcoholics Anonymous [phone], [employer] for daily AA meeting locations   [facility] [phone],   [facility] [phone],   [facility] [phone],   [facility] [phone]  [facility] [phone] in [facility] peer recovery services [phone]    [address] [address]  [phone]  Walk-ins/phone calls accepted 24/7/365.    [facility] outpatient programs for mental health and chemical dependency services  [address] [place] [address]   [phone],   or  [address] [place], [place] [id number] [phone]     211 for [facility]'s First Call for help, 24/7/365

## 2024-05-29 NOTE — ED TRIAGE NOTES
Pt was brought in by ems for weakness. Pt states that she was discharged on the 22nd from being admitted for weakness, and abnormal lab values. Pt states that she has felt increasily weak and had to call 911 last night for a lift assist. Pt states that she started vomiting this morning and is unable to self ambulate safely

## 2024-05-29 NOTE — ED NOTES
PT niece gregg call and report prior to ems arrival, pt neighbor reported that she thinks the pt had about a 30 sec seizure. No seizure suspected or observed while in ED.     Nehal Garcia RN  05/29/24 5701       Nehal Garcia RN  05/29/24 2568

## 2024-05-29 NOTE — CONSULTS
Inpatient consult to Cardiology  Consult performed by: Jillian Aviles, GRUPO-CNP  Consult ordered by: Colby Cazares MD  Reason for consult: Atrial Fibrillation RVR          History Of Present Illness:    Svitlana Perry is a 74 y.o. female with a past medical history of alcoholic cirrhosis of the liver c/b ascites and hepatic encephalopathy, alcoholic hepatitis(6/2020), COPD, hypertension, hyperlipidemia, pancreatitis, PVD. Recent admit 5/23-5/27/24 for generalized weakness (found covered in her feces)/ COPD exac and FTT. Patient presents today to the ED for further evaluation of syncope and collapsing, found to be in atrial fibrillation RVR.  Cardiology consulted for further evaluation of atrial fibrillation RVR..    Evaluated patient today here at [facility], patient reports that she is here for further evaluation of syncope with collapsing/dizziness and lightheadedness.  Patient reports that yesterday she was at her normal state of health when she had sudden onset of feeling dizzy and lightheaded followed by falling out of her chair to the floor.  She is unclear if she struck her head, but does report having brief LOC.  When she regained consciousness she reports that she called the police who came to help her off the floor; she declined medical evaluation at the time.  This morning she reports that she awakened with the same symptoms dizziness and lightheadedness, her neighbor who comes to check on her daily, reportedly noted that she had brief episode of uncontrollable body movements, ?  Seizure activity.  EMS was called immediately to have patient taken in for further evaluation.  Patient denies any associated symptoms of chest pain, dyspnea, palpitations, heart racing, nausea/vomiting/diaphoresis.  When patient arrived to the ED, she was noted to be in A-fib RVR.  She was treated with Lopressor 5 mg IV x 1 dose> SBP reduced to 80's.  She was fluid resuscitated and is now on an amnio infusion.  She was also  found to have hypomagnesemia and hypokalemia> both supplemented. SBP's improved in the 90's. Currently, telemetry reveals heart rate in the 140s, she continues to deny any complaints of palpitations/heart racing or chest pain.    In addition, patient reports that she lives alone, she receives physical therapy a few times a week.  She denies taking any prescription medications.  She denies any past medical history of cardiac surgeries or coronary percutaneous interventions.    ED course: Initial EKG: Initial EKG Atrial Fibrillation    Arrival vital signs: Afebrile 98.2, HR 68, BP 88/61, 95% on supplemental oxygen  Current vital signs: BP 96/63,   Pertinent imaging/Labs: WBC 13.2, Hgb 9.8 , , K3.3, CR 0.79  ED medications: LR x 1 L, Lopressor 5 mg IV x 1 dose, amiodarone bolus/infusion, potassium and magnesium supplementation, IV antibiotic therapy    Home CV meds:  Furosemide 40 mg p.o. daily, spironolactone 50 mg p.o. daily (prescribed, reportedly not taking)    All other systems reviewed and negative unless as mentioned in HPI.     Past Medical/ Surgical History:  Atrial fibrillation noted on EKG history review 9/14/2023  Hypertension  Hyperlipidemia  Alcoholic hepatitis 6/2020  Alcoholic cirrhosis c/b ascites and hepatic encephalopathy  Pancreatitis  Peripheral vascular disease  Appendectomy 9/23    Social History:  Reports that she smokes cigarettes occasionally  History of alcohol abuse; reports she has reduced alcohol consumption significantly  States that her last drink was 2 weeks ago, she had low powered vodka  Denies illicit drug use     Family History:  Reviewed, not pertinent to presenting problem    Past Cardiology Tests (Last 3 Years):  Echocardiogram 9/12/2016:  CONCLUSIONS:   1. The left ventricular systolic function is normal with a 60-65% estimated ejection fraction.   2. The left atrium is normal in size.   3. Aortic valve sclerosis.   4. There is no evidence of a patent  "foramen ovale    ROS:  10 point review of systems including (Constitutional, Eyes, ENMT, Respiratory, Cardiac, Gastrointestinal, Neurological, Psychiatric, and Hematologic) was performed and is otherwise negative.    Objective Data:  Last Recorded Vitals:  Vitals:    24 1517 24 1520 24 1530 24 1545   BP: 108/87 116/78 117/67 90/52   BP Location:       Patient Position:       Pulse: 99 99 97 (!) 105   Resp: 20 20 20 19   Temp:       TempSrc:       SpO2: 97% 96%  97%   Weight:       Height:         Medical Gas Therapy: None (Room air)  O2 Delivery Method: Nasal cannula  Weight  Av.7 kg (125 lb)  Min: 56.7 kg (125 lb)  Max: 56.7 kg (125 lb)      LABS:  CMP:  Results from last 7 days   Lab Units 24  1250 24  0640 24  0550 24  0752 24  1252   SODIUM mmol/L 134* 137 134* 138 132*   POTASSIUM mmol/L 3.3* 3.2* 3.2* 2.6* 3.3*   CHLORIDE mmol/L 97* 101 99 98 94*   CO2 mmol/L 27 29 26 27 18*   ANION GAP mmol/L 13 10 12 16 23*   BUN mg/dL 21 13 13 12 16   CREATININE mg/dL 0.79 0.43* 0.46* 0.52 0.69   EGFR mL/min/1.73m*2 80 >90 >90 >90 >90   MAGNESIUM mg/dL 1.10*  --  1.60 1.40* 1.30*   ALBUMIN g/dL 3.4 3.0* 3.2* 3.2* 3.8   ALT U/L 35 38 35 35 42   AST U/L 40* 74* 82* 82* 106*   BILIRUBIN TOTAL mg/dL 2.0* 1.6* 2.3* 3.5* 4.8*     CBC:  Results from last 7 days   Lab Units 24  1250 24  0640 24  0550 24  0752 24  1252   WBC AUTO x10*3/uL 13.2* 6.9 7.3 5.1 4.5   HEMOGLOBIN g/dL 9.8* 9.9* 9.9* 10.5* 12.2   HEMATOCRIT % 28.2* 27.2* 26.4* 28.0* 33.1*   PLATELETS AUTO x10*3/uL 192 92* 83* 62* 59*   MCV fL 103* 99 97 95 98     COAG:   Results from last 7 days   Lab Units 24  1250 24  1421 24  1252   INR  1.2* 1.0 1.0     ABO: No results found for: \"ABO\"  HEME/ENDO:  Results from last 7 days   Lab Units 24  0752   FERRITIN ng/mL 3,541*   TSH mIU/L 3.38   HEMOGLOBIN A1C % 4.1      CARDIAC:   Results from last 7 days   Lab Units " 05/29/24  1344 05/29/24  1250 05/23/24  2043 05/23/24  1252   TROPHS ng/L 209* 263* 12 15*   BNP pg/mL  --   --   --  120*      Results from last 7 days   Lab Units 05/24/24  0752   HEMOGLOBIN A1C % 4.1        Last I/O:  No intake or output data in the 24 hours ending 05/29/24 1625  Net IO Since Admission: No IO data has been entered for this period [05/29/24 1625]      Imaging Results:  ECG 12 lead    Result Date: 5/29/2024  Atrial fibrillation with rapid ventricular response ST & T wave abnormality, consider inferior ischemia ST & T wave abnormality, consider anterolateral ischemia Abnormal ECG When compared with ECG of 23-MAY-2024 12:06, Atrial fibrillation has replaced Sinus rhythm Vent. rate has increased BY  58 BPM Inverted T waves have replaced nonspecific T wave abnormality in Inferior leads Inverted T waves have replaced nonspecific T wave abnormality in Lateral leads      Inpatient Medications:  Scheduled medications   Medication Dose Route Frequency    cefTRIAXone  1 g intravenous Once    etomidate  5 mg intravenous Once    fentaNYL  50 mcg intravenous Once    fluticasone-umeclidin-vilanter  1 puff inhalation BID    folic acid  1 mg oral Daily    heparin  80 Units/kg intravenous Once    lactated Ringer's  250 mL intravenous Once    magnesium sulfate  4 g intravenous Once    metoprolol  2.5 mg intravenous Once    metoprolol  2.5 mg intravenous Once    metoprolol  5 mg intravenous Once    multivitamin with minerals  1 tablet oral Daily    sodium chloride  500 mL intravenous Once    [START ON 6/1/2024] thiamine  100 mg oral Daily    thiamine  100 mg intravenous Daily     PRN medications   Medication    heparin     Continuous Medications   Medication Dose Last Rate    amiodarone  0.5-1 mg/min 0.5 mg/min (05/29/24 1519)    heparin  0-4,500 Units/hr         Inpatient Medications:  Scheduled medications   Medication Dose Route Frequency    cefTRIAXone  1 g intravenous Once    etomidate  5 mg intravenous Once     fentaNYL  50 mcg intravenous Once    fluticasone-umeclidin-vilanter  1 puff inhalation BID    folic acid  1 mg oral Daily    heparin  80 Units/kg intravenous Once    lactated Ringer's  250 mL intravenous Once    magnesium sulfate  4 g intravenous Once    metoprolol  2.5 mg intravenous Once    metoprolol  2.5 mg intravenous Once    metoprolol  5 mg intravenous Once    multivitamin with minerals  1 tablet oral Daily    sodium chloride  500 mL intravenous Once    [START ON 6/1/2024] thiamine  100 mg oral Daily    thiamine  100 mg intravenous Daily     PRN medications   Medication    heparin     Continuous Medications   Medication Dose Last Rate    amiodarone  0.5-1 mg/min 0.5 mg/min (05/29/24 1519)    heparin  0-4,500 Units/hr       Outpatient Medications:  Current Outpatient Medications   Medication Instructions    albuterol 90 mcg/actuation inhaler 2 puffs, inhalation, Every 4 hours PRN    fluticasone propion-salmeteroL (Advair Diskus) 250-50 mcg/dose diskus inhaler 1 puff, inhalation, 2 times daily RT, Rinse mouth with water after use to reduce aftertaste and incidence of candidiasis. Do not swallow.    fluticasone-umeclidin-vilanter (Trelegy Ellipta) 100-62.5-25 mcg blister with device 1 puff, inhalation, Daily    ipratropium-albuteroL (Duo-Neb) 0.5-2.5 mg/3 mL nebulizer solution USE 1 UNIT DOSE VIA NEBULIZER THREE TIMES DAILY.       Physical Exam:  General: Pleasant elderly female, alert and oriented x 3, NAD  HEENT:  Pupils equal and reactive.  Normocephalic.  Moist mucosa.    Neck:  No thyromegaly.  Normal Jugular Venous Pressure.  Cardiovascular: Irregularly irregular rhythm. No cardiac murmurs noted.  Pulmonary:  Clear to auscultation bilaterally.  Abdomen:  Soft. Non-tender.   Non-distended.  Positive bowel sounds.  Lower Extremities:  2+ pedal pulses. No LE edema.  Neurologic:  Cranial nerves intact.  No focal deficit.   Skin: Skin warm and dry, normal skin turgor.   Psychiatric: Normal affect.      Assessment/Plan   Svitlana Perry is a 74 y.o. female with a past medical history of alcoholic cirrhosis of the liver c/b ascites and hepatic encephalopathy, alcoholic hepatitis(6/2020), COPD, hypertension, hyperlipidemia, pancreatitis, PVD. Recent admit 5/23-5/27/24 for generalized weakness (found covered in her feces)/ COPD exac and FTT. Patient presents today to the ED for further evaluation of syncope and collapsing, found to be in atrial fibrillation RVR.  Cardiology consulted for further evaluation of atrial fibrillation RVR.    I reviewed EKGs, labs and all imaging reports  I reviewed telemetry, atrial fibrillation RVR rates 140-as high as 170s      1.  New asymptomatic atrial fibrillation RVR. Prakash score 3  -TSH 3.38 (5/24/24)  -Discussed R/B of starting OAC for stroke prevention, pt. was in agreement with proceeding w/ DOAC Eliquis 5mg PO BID if indicated    2.  Syncope and collapse.  Unclear etiology.  Probable 2/2 AF RVR  Patient denies any complaints of chest pain, dyspnea, palpitations prior to syncopal event  She reports the day after syncopal event her neighbor noted brief uncontrollable body movements>?  Seizure activity  -Echo pending  - D Dimer elevated 5/23/24, CT Angio of chest was neg for PE  -Holter monitor prior to discharge> to evaluate AF burden and any other probable arrhythmias    3.  Hypertension.  Currently BP soft, monitoring    4. Electrolyte imbalance: Hypokalemia and hypomagnesemia  K 3.3, Mag 1.10 >supplemented    5.  Leukocytosis> for monitoring/evaluation defer to primary  on IV antibiotic therapy      Recommendations  Atrial fibrillation RVR> patient currently asymptomatic in setting of infection/electrolyte imbalance; denies EtOH use (Last reported use 2 weeks ago)  Treated RVR with Lopressor IVP> became hypotensive  Started amiodarone bolus/ infusion> monitor response  Correcting electrolytes> keep K greater than 4.0 and mag greater than 2.0, supplement as  needed  Echocardiogram pending  Probable WON/cardioversion pending course  Anticoagulation per Dr. Laura    Code Status:  Full Code    Mike Laura, DO    ==============================================  Attending note   ==============================================  Both the BRADY and I have had a face to face encounter with the patient today. I have examined the patient and edited the documented physical examination as necessary.  I personally reviewed the patient's recent labs, medications, orders, EKGs, and pertinent cardiac imaging.  I have reviewed the BRADY's encounter note, approve the BRADY's documentation and have edited the note to reflect the diagnostic and therapeutic plan.    Svitlana Perry is a 74 y.o. female with a past medical history of alcoholic cirrhosis of the liver c/b ascites and hepatic encephalopathy, alcoholic hepatitis(6/2020), COPD, hypertension, hyperlipidemia, pancreatitis, PVD. Recent admit 5/23-5/27/24 for generalized weakness (found covered in her feces)/ COPD exac and FTT. Patient presents today to the ED for further evaluation of syncope and collapsing, found to be in atrial fibrillation RVR.  Cardiology consulted for further evaluation of atrial fibrillation RVR..      No exacerbating or relieving factors.  Patient denies chest pain and angina.  Pt denies shortness of breath, dyspnea on exertion, orthopnea, and paroxysmal nocturnal dyspnea.  Pt denies worsening lower extremity edema.  Pt denies palpitations But reports  syncope.  Admits to falls.  No fever or chills.  No cough.  No change in bowel or bladder habits.  No sick contacts.  No recent travel.     12 point review of systems including (Constitutional, Eyes, ENMT, Respiratory, Cardiac, Gastrointestinal, Neurological, Psychiatric, and Hematologic) was performed and is otherwise negative.    Past medical history:  As above.    Medications were reviewed.    Allergies were reviewed.    Social history:  Patient denies  "smoking, alcohol abuse, or illicit drug use.    Family history:  No sudden cardiac death or premature coronary artery disease.     BP 90/52   Pulse (!) 105   Temp 36.8 °C (98.2 °F) (Oral)   Resp 19   Ht 1.651 m (5' 5\")   Wt 56.7 kg (125 lb)   SpO2 97%   BMI 20.80 kg/m²   General:  Patient is awake, alert, and oriented.  Patient is in no acute distress.  HEENT:  Pupils equal and reactive.  Normocephalic.  Moist mucosa.    Neck:  No thyromegaly.  Normal Jugular Venous Pressure.  Cardiovascular:  Tachycardic rate and regular rhythm.  Normal S1 and S2.  Pulmonary:  Clear to auscultation bilaterally.  Abdomen:  Soft. Non-tender.   Non-distended.  Positive bowel sounds.  Lower Extremities:  2+ pedal pulses. No LE edema.  Neurologic:  Cranial nerves intact.  No focal deficit.   Skin: Skin warm and dry, normal skin turgor.   Psychiatric: Normal affect.    Vital signs, telemetry, medications, labs, and imaging were reviewed as well.    Svitlana Perry is a 74 y.o. female with a past medical history of alcoholic cirrhosis of the liver c/b ascites and hepatic encephalopathy, alcoholic hepatitis(6/2020), COPD, hypertension, hyperlipidemia, pancreatitis, PVD. Recent admit 5/23-5/27/24 for generalized weakness (found covered in her feces)/ COPD exac and FTT. Patient presents today to the ED for further evaluation of syncope and collapsing, found to be in atrial fibrillation RVR. During ER course, she was treated with IV Metoprolol ( 2.5 mg X 2 followed by 5 mg X3 with additional fluid resusication. She was transferred for CT-H given fall with TLOC. When she returned from CT Study, blood pressure ~ 72 and HR ~ 140s. ER proceeded with Cardioversion due to hemodynamic instability with apparent termination of atrial fibrillation into Sinus with PAC's ( Repeated EKG Pending)     -- Heparin Infusion for anticoagulation at this time   -- Load Amiodarone ( No Bolus) ( 1 mg / min X 6 hours followed by 0.5 mg / min)   -- " Potassium > 4.5, Mag > 2.2   -- 4G Magnesium now   -- Replace Electrolytes and Fluid Resuscitate at this time   -- Dispo as per primary service       Mike Laura DO   Division of Cardiovascular Medicine  [facility] [place]

## 2024-05-29 NOTE — ED NOTES
Pt has complaints of dizziness and sob. Pt reports syncope episode, she woke up on the floor last night, pt cannot recall if she hit her head.pt presents hypotensive and tachycardic. Pt connected to cardiac monitoring, cycling bp and continuous sp02 monitoring.     Nehal Garcia RN  05/29/24 [id number]

## 2024-05-29 NOTE — PROGRESS NOTES
05/29/24 1444   [facility] Disability Status   Are you deaf or do you have serious difficulty hearing? N   Are you blind or do you have serious difficulty seeing, even when wearing glasses? N   Because of a physical, mental, or emotional condition, do you have serious difficulty concentrating, remembering, or making decisions? (5 years old or older) Y  (ETOH abuse)   Do you have serious difficulty walking or climbing stairs? Y   Do you have serious difficulty dressing or bathing? N   Because of a physical, mental, or emotional condition, do you have serious difficulty doing errands alone such as visiting the doctor? Y

## 2024-05-30 ENCOUNTER — HOME CARE VISIT (OUTPATIENT)
Dept: HOME HEALTH SERVICES | Facility: HOME HEALTH | Age: 74
End: 2024-05-30

## 2024-05-30 ENCOUNTER — APPOINTMENT (OUTPATIENT)
Dept: CARDIOLOGY | Facility: HOSPITAL | Age: 74
DRG: 309 | End: 2024-05-30
Payer: COMMERCIAL

## 2024-05-30 LAB
ANION GAP SERPL CALC-SCNC: 11 MMOL/L (ref 10–20)
BUN SERPL-MCNC: 14 MG/DL (ref 6–23)
CALCIUM SERPL-MCNC: 7.5 MG/DL (ref 8.6–10.3)
CHLORIDE SERPL-SCNC: 104 MMOL/L (ref 98–107)
CO2 SERPL-SCNC: 24 MMOL/L (ref 21–32)
CREAT SERPL-MCNC: 0.56 MG/DL (ref 0.5–1.05)
EGFRCR SERPLBLD CKD-EPI 2021: >90 ML/MIN/1.73M*2
ERYTHROCYTE [DISTWIDTH] IN BLOOD BY AUTOMATED COUNT: 14.9 % (ref 11.5–14.5)
GLUCOSE SERPL-MCNC: 107 MG/DL (ref 74–99)
HCT VFR BLD AUTO: 21.5 % (ref 36–46)
HGB BLD-MCNC: 7.3 G/DL (ref 12–16)
MAGNESIUM SERPL-MCNC: 1.7 MG/DL (ref 1.6–2.4)
MCH RBC QN AUTO: 34.6 PG (ref 26–34)
MCHC RBC AUTO-ENTMCNC: 34 G/DL (ref 32–36)
MCV RBC AUTO: 102 FL (ref 80–100)
NRBC BLD-RTO: 0 /100 WBCS (ref 0–0)
PLATELET # BLD AUTO: 172 X10*3/UL (ref 150–450)
POTASSIUM SERPL-SCNC: 3.6 MMOL/L (ref 3.5–5.3)
RBC # BLD AUTO: 2.11 X10*6/UL (ref 4–5.2)
SODIUM SERPL-SCNC: 135 MMOL/L (ref 136–145)
UFH PPP CHRO-ACNC: 0.2 IU/ML
UFH PPP CHRO-ACNC: 0.3 IU/ML
UFH PPP CHRO-ACNC: 0.5 IU/ML
WBC # BLD AUTO: 10.1 X10*3/UL (ref 4.4–11.3)

## 2024-05-30 PROCEDURE — 94640 AIRWAY INHALATION TREATMENT: CPT

## 2024-05-30 PROCEDURE — 2060000001 HC INTERMEDIATE ICU ROOM DAILY

## 2024-05-30 PROCEDURE — 2500000001 HC RX 250 WO HCPCS SELF ADMINISTERED DRUGS (ALT 637 FOR MEDICARE OP)

## 2024-05-30 PROCEDURE — 99233 SBSQ HOSP IP/OBS HIGH 50: CPT | Performed by: INTERNAL MEDICINE

## 2024-05-30 PROCEDURE — 97161 PT EVAL LOW COMPLEX 20 MIN: CPT | Mod: GP

## 2024-05-30 PROCEDURE — 2500000001 HC RX 250 WO HCPCS SELF ADMINISTERED DRUGS (ALT 637 FOR MEDICARE OP): Performed by: NURSE PRACTITIONER

## 2024-05-30 PROCEDURE — G0378 HOSPITAL OBSERVATION PER HR: HCPCS

## 2024-05-30 PROCEDURE — 97165 OT EVAL LOW COMPLEX 30 MIN: CPT | Mod: GO | Performed by: OCCUPATIONAL THERAPIST

## 2024-05-30 PROCEDURE — 82374 ASSAY BLOOD CARBON DIOXIDE: CPT | Performed by: NURSE PRACTITIONER

## 2024-05-30 PROCEDURE — 97535 SELF CARE MNGMENT TRAINING: CPT | Mod: GO | Performed by: OCCUPATIONAL THERAPIST

## 2024-05-30 PROCEDURE — 83735 ASSAY OF MAGNESIUM: CPT | Performed by: NURSE PRACTITIONER

## 2024-05-30 PROCEDURE — 2500000004 HC RX 250 GENERAL PHARMACY W/ HCPCS (ALT 636 FOR OP/ED): Performed by: NURSE PRACTITIONER

## 2024-05-30 PROCEDURE — 85520 HEPARIN ASSAY: CPT | Mod: 91 | Performed by: NURSE PRACTITIONER

## 2024-05-30 PROCEDURE — 99232 SBSQ HOSP IP/OBS MODERATE 35: CPT | Performed by: STUDENT IN AN ORGANIZED HEALTH CARE EDUCATION/TRAINING PROGRAM

## 2024-05-30 PROCEDURE — 2500000002 HC RX 250 W HCPCS SELF ADMINISTERED DRUGS (ALT 637 FOR MEDICARE OP, ALT 636 FOR OP/ED): Mod: MUE

## 2024-05-30 PROCEDURE — 85027 COMPLETE CBC AUTOMATED: CPT | Performed by: NURSE PRACTITIONER

## 2024-05-30 PROCEDURE — 36415 COLL VENOUS BLD VENIPUNCTURE: CPT | Performed by: NURSE PRACTITIONER

## 2024-05-30 RX ORDER — AMIODARONE HYDROCHLORIDE 200 MG/1
400 TABLET ORAL 2 TIMES DAILY
Status: DISCONTINUED | OUTPATIENT
Start: 2024-05-30 | End: 2024-06-01 | Stop reason: HOSPADM

## 2024-05-30 RX ORDER — AMIODARONE HYDROCHLORIDE 200 MG/1
200 TABLET ORAL EVERY OTHER DAY
Status: DISCONTINUED | OUTPATIENT
Start: 2024-06-10 | End: 2024-06-01 | Stop reason: HOSPADM

## 2024-05-30 RX ADMIN — THIAMINE HYDROCHLORIDE 100 MG: 100 INJECTION, SOLUTION INTRAMUSCULAR; INTRAVENOUS at 08:27

## 2024-05-30 RX ADMIN — AMIODARONE HYDROCHLORIDE 0.5 MG/MIN: 1.8 INJECTION, SOLUTION INTRAVENOUS at 03:00

## 2024-05-30 RX ADMIN — APIXABAN 5 MG: 5 TABLET, FILM COATED ORAL at 20:51

## 2024-05-30 RX ADMIN — HEPARIN SODIUM 1200 UNITS/HR: 10000 INJECTION, SOLUTION INTRAVENOUS at 09:32

## 2024-05-30 RX ADMIN — AMIODARONE HYDROCHLORIDE 400 MG: 200 TABLET ORAL at 15:11

## 2024-05-30 RX ADMIN — TIOTROPIUM BROMIDE INHALATION SPRAY 2 PUFF: 3.12 SPRAY, METERED RESPIRATORY (INHALATION) at 08:35

## 2024-05-30 RX ADMIN — MULTIPLE VITAMINS W/ MINERALS TAB 1 TABLET: TAB at 08:27

## 2024-05-30 RX ADMIN — FOLIC ACID 1 MG: 1 TABLET ORAL at 08:27

## 2024-05-30 RX ADMIN — FLUTICASONE FUROATE AND VILANTEROL TRIFENATATE 1 PUFF: 100; 25 POWDER RESPIRATORY (INHALATION) at 08:35

## 2024-05-30 RX ADMIN — CEFTRIAXONE SODIUM 1 G: 1 INJECTION, SOLUTION INTRAVENOUS at 15:11

## 2024-05-30 RX ADMIN — APIXABAN 5 MG: 5 TABLET, FILM COATED ORAL at 10:43

## 2024-05-30 SDOH — SOCIAL STABILITY: SOCIAL NETWORK
DO YOU BELONG TO ANY CLUBS OR ORGANIZATIONS SUCH AS CHURCH GROUPS, UNIONS, FRATERNAL OR ATHLETIC GROUPS, OR SCHOOL GROUPS?: NO

## 2024-05-30 SDOH — ECONOMIC STABILITY: FOOD INSECURITY: WITHIN THE PAST 12 MONTHS, YOU WORRIED THAT YOUR FOOD WOULD RUN OUT BEFORE YOU GOT THE MONEY TO BUY MORE.: NEVER TRUE

## 2024-05-30 SDOH — ECONOMIC STABILITY: FOOD INSECURITY: WITHIN THE PAST 12 MONTHS, YOU WORRIED THAT YOUR FOOD WOULD RUN OUT BEFORE YOU GOT MONEY TO BUY MORE.: NEVER TRUE

## 2024-05-30 SDOH — SOCIAL STABILITY: SOCIAL NETWORK: HOW OFTEN DO YOU ATTEND MEETINGS OF THE CLUBS OR ORGANIZATIONS YOU BELONG TO?: NEVER

## 2024-05-30 SDOH — ECONOMIC STABILITY: FOOD INSECURITY: WITHIN THE PAST 12 MONTHS, THE FOOD YOU BOUGHT JUST DIDN'T LAST AND YOU DIDN'T HAVE MONEY TO GET MORE.: NEVER TRUE

## 2024-05-30 SDOH — SOCIAL STABILITY: SOCIAL INSECURITY
WITHIN THE LAST YEAR, HAVE YOU BEEN RAPED OR FORCED TO HAVE ANY KIND OF SEXUAL ACTIVITY BY YOUR PARTNER OR EX-PARTNER?: NO

## 2024-05-30 SDOH — HEALTH STABILITY: PHYSICAL HEALTH: ON AVERAGE, HOW MANY MINUTES DO YOU ENGAGE IN EXERCISE AT THIS LEVEL?: 0 MIN

## 2024-05-30 SDOH — HEALTH STABILITY: MENTAL HEALTH
DO YOU FEEL STRESS - TENSE, RESTLESS, NERVOUS, OR ANXIOUS, OR UNABLE TO SLEEP AT NIGHT BECAUSE YOUR MIND IS TROUBLED ALL THE TIME - THESE DAYS?: ONLY A LITTLE

## 2024-05-30 SDOH — SOCIAL STABILITY: SOCIAL NETWORK
IN A TYPICAL WEEK, HOW MANY TIMES DO YOU TALK ON THE PHONE WITH FAMILY, FRIENDS, OR NEIGHBORS?: MORE THAN THREE TIMES A WEEK

## 2024-05-30 SDOH — SOCIAL STABILITY: SOCIAL INSECURITY: WITHIN THE LAST YEAR, HAVE YOU BEEN HUMILIATED OR EMOTIONALLY ABUSED IN OTHER WAYS BY YOUR PARTNER OR EX-PARTNER?: NO

## 2024-05-30 SDOH — SOCIAL STABILITY: SOCIAL INSECURITY
WITHIN THE LAST YEAR, HAVE TO BEEN RAPED OR FORCED TO HAVE ANY KIND OF SEXUAL ACTIVITY BY YOUR PARTNER OR EX-PARTNER?: NO

## 2024-05-30 SDOH — SOCIAL STABILITY: SOCIAL INSECURITY: ABUSE: ADULT

## 2024-05-30 SDOH — HEALTH STABILITY: PHYSICAL HEALTH: ON AVERAGE, HOW MANY DAYS PER WEEK DO YOU ENGAGE IN MODERATE TO STRENUOUS EXERCISE (LIKE A BRISK WALK)?: 0 DAYS

## 2024-05-30 SDOH — SOCIAL STABILITY: SOCIAL INSECURITY
WITHIN THE LAST YEAR, HAVE YOU BEEN KICKED, HIT, SLAPPED, OR OTHERWISE PHYSICALLY HURT BY YOUR PARTNER OR EX-PARTNER?: NO

## 2024-05-30 SDOH — SOCIAL STABILITY: SOCIAL INSECURITY: WITHIN THE LAST YEAR, HAVE YOU BEEN AFRAID OF YOUR PARTNER OR EX-PARTNER?: NO

## 2024-05-30 SDOH — SOCIAL STABILITY: SOCIAL NETWORK: HOW OFTEN DO YOU ATTEND CHURCH OR RELIGIOUS SERVICES?: NEVER

## 2024-05-30 SDOH — SOCIAL STABILITY: SOCIAL NETWORK
DO YOU BELONG TO ANY CLUBS OR ORGANIZATIONS SUCH AS CHURCH GROUPS UNIONS, FRATERNAL OR ATHLETIC GROUPS, OR SCHOOL GROUPS?: NO

## 2024-05-30 SDOH — SOCIAL STABILITY: SOCIAL INSECURITY: ARE THERE ANY APPARENT SIGNS OF INJURIES/BEHAVIORS THAT COULD BE RELATED TO ABUSE/NEGLECT?: NO

## 2024-05-30 SDOH — SOCIAL STABILITY: SOCIAL INSECURITY: HAS ANYONE EVER THREATENED TO HURT YOUR FAMILY OR YOUR PETS?: NO

## 2024-05-30 SDOH — SOCIAL STABILITY: SOCIAL INSECURITY: ARE YOU MARRIED, WIDOWED, DIVORCED, SEPARATED, NEVER MARRIED, OR LIVING WITH A PARTNER?: NEVER MARRIED

## 2024-05-30 SDOH — ECONOMIC STABILITY: INCOME INSECURITY: IN THE PAST 12 MONTHS HAS THE ELECTRIC, GAS, OIL, OR WATER COMPANY THREATENED TO SHUT OFF SERVICES IN YOUR HOME?: NO

## 2024-05-30 SDOH — SOCIAL STABILITY: SOCIAL INSECURITY: HAVE YOU HAD ANY THOUGHTS OF HARMING ANYONE ELSE?: NO

## 2024-05-30 SDOH — ECONOMIC STABILITY: INCOME INSECURITY: IN THE PAST 12 MONTHS, HAS THE ELECTRIC, GAS, OIL, OR WATER COMPANY THREATENED TO SHUT OFF SERVICE IN YOUR HOME?: NO

## 2024-05-30 SDOH — SOCIAL STABILITY: SOCIAL INSECURITY: ARE YOU OR HAVE YOU BEEN THREATENED OR ABUSED PHYSICALLY, EMOTIONALLY, OR SEXUALLY BY ANYONE?: NO

## 2024-05-30 SDOH — SOCIAL STABILITY: SOCIAL NETWORK: HOW OFTEN DO YOU GET TOGETHER WITH FRIENDS OR RELATIVES?: TWICE A WEEK

## 2024-05-30 SDOH — SOCIAL STABILITY: SOCIAL NETWORK: ARE YOU MARRIED, WIDOWED, DIVORCED, SEPARATED, NEVER MARRIED, OR LIVING WITH A PARTNER?: NEVER MARRIED

## 2024-05-30 SDOH — HEALTH STABILITY: PHYSICAL HEALTH
HOW OFTEN DO YOU NEED TO HAVE SOMEONE HELP YOU WHEN YOU READ INSTRUCTIONS, PAMPHLETS, OR OTHER WRITTEN MATERIAL FROM YOUR DOCTOR OR PHARMACY?: RARELY

## 2024-05-30 SDOH — HEALTH STABILITY: MENTAL HEALTH
STRESS IS WHEN SOMEONE FEELS TENSE, NERVOUS, ANXIOUS, OR CAN'T SLEEP AT NIGHT BECAUSE THEIR MIND IS TROUBLED. HOW STRESSED ARE YOU?: ONLY A LITTLE

## 2024-05-30 SDOH — SOCIAL STABILITY: SOCIAL NETWORK: HOW OFTEN DO YOU ATTENT MEETINGS OF THE CLUB OR ORGANIZATION YOU BELONG TO?: NEVER

## 2024-05-30 SDOH — SOCIAL STABILITY: SOCIAL INSECURITY: DOES ANYONE TRY TO KEEP YOU FROM HAVING/CONTACTING OTHER FRIENDS OR DOING THINGS OUTSIDE YOUR HOME?: NO

## 2024-05-30 SDOH — SOCIAL STABILITY: SOCIAL INSECURITY: DO YOU FEEL UNSAFE GOING BACK TO THE PLACE WHERE YOU ARE LIVING?: NO

## 2024-05-30 SDOH — SOCIAL STABILITY: SOCIAL INSECURITY: DO YOU FEEL ANYONE HAS EXPLOITED OR TAKEN ADVANTAGE OF YOU FINANCIALLY OR OF YOUR PERSONAL PROPERTY?: NO

## 2024-05-30 SDOH — SOCIAL STABILITY: SOCIAL INSECURITY: WERE YOU ABLE TO COMPLETE ALL THE BEHAVIORAL HEALTH SCREENINGS?: YES

## 2024-05-30 SDOH — SOCIAL STABILITY: SOCIAL INSECURITY: HAVE YOU HAD THOUGHTS OF HARMING ANYONE ELSE?: NO

## 2024-05-30 ASSESSMENT — ACTIVITIES OF DAILY LIVING (ADL)
WALKS IN HOME: NEEDS ASSISTANCE
ADEQUATE_TO_COMPLETE_ADL: YES
HEARING - RIGHT EAR: FUNCTIONAL
JUDGMENT_ADEQUATE_SAFELY_COMPLETE_DAILY_ACTIVITIES: YES
BATHING_ASSISTANCE: SPONGE BATHING
PATIENT'S MEMORY ADEQUATE TO SAFELY COMPLETE DAILY ACTIVITIES?: YES
WALKS IN HOME: NEEDS ASSISTANCE
TOILETING: NEEDS ASSISTANCE
ASSISTIVE_DEVICE: DENTURES UPPER;WALKER
HEARING - RIGHT EAR: FUNCTIONAL
TOILETING: NEEDS ASSISTANCE
HEARING - LEFT EAR: FUNCTIONAL
ASSISTIVE_DEVICE: DENTURES UPPER;WALKER
ADEQUATE_TO_COMPLETE_ADL: YES
PATIENT'S MEMORY ADEQUATE TO SAFELY COMPLETE DAILY ACTIVITIES?: YES
ADL_ASSISTANCE: INDEPENDENT
GROOMING: NEEDS ASSISTANCE
FEEDING YOURSELF: INDEPENDENT
FEEDING YOURSELF: NEEDS ASSISTANCE
HEARING - LEFT EAR: FUNCTIONAL
BATHING: NEEDS ASSISTANCE
GROOMING: NEEDS ASSISTANCE
DRESSING YOURSELF: NEEDS ASSISTANCE
DRESSING YOURSELF: NEEDS ASSISTANCE
BATHING: NEEDS ASSISTANCE
HOME_MANAGEMENT_TIME_ENTRY: 14

## 2024-05-30 ASSESSMENT — LIFESTYLE VARIABLES
NAUSEA AND VOMITING: NO NAUSEA AND NO VOMITING
PAROXYSMAL SWEATS: NO SWEAT VISIBLE
ORIENTATION AND CLOUDING OF SENSORIUM: ORIENTED AND CAN DO SERIAL ADDITIONS
AUDITORY DISTURBANCES: NOT PRESENT
AUDIT TOTAL SCORE: 25
PAROXYSMAL SWEATS: NO SWEAT VISIBLE
ORIENTATION AND CLOUDING OF SENSORIUM: ORIENTED AND CAN DO SERIAL ADDITIONS
AUDITORY DISTURBANCES: NOT PRESENT
TREMOR: 2
AGITATION: NORMAL ACTIVITY
TOTAL SCORE: 5
ANXIETY: 3
VISUAL DISTURBANCES: NOT PRESENT
TOTAL SCORE: 2
PRESCIPTION_ABUSE_PAST_12_MONTHS: NO
HOW OFTEN DO YOU HAVE A DRINK CONTAINING ALCOHOL: 4 OR MORE TIMES A WEEK
SKIP TO QUESTIONS 9-10: 0
NAUSEA AND VOMITING: NO NAUSEA AND NO VOMITING
HEADACHE, FULLNESS IN HEAD: NOT PRESENT
HAVE YOU OR SOMEONE ELSE BEEN INJURED AS A RESULT OF YOUR DRINKING: NO
AGITATION: NORMAL ACTIVITY
VISUAL DISTURBANCES: NOT PRESENT
TREMOR: 2
PAROXYSMAL SWEATS: NO SWEAT VISIBLE
HEADACHE, FULLNESS IN HEAD: NOT PRESENT
HOW OFTEN DURING THE LAST YEAR HAVE YOU FOUND THAT YOU WERE NOT ABLE TO STOP DRINKING ONCE YOU HAD STARTED: WEEKLY
AGITATION: NORMAL ACTIVITY
VISUAL DISTURBANCES: NOT PRESENT
AUDIT-C TOTAL SCORE: 10
HOW OFTEN DURING THE LAST YEAR HAVE YOU BEEN UNABLE TO REMEMBER WHAT HAPPENED THE NIGHT BEFORE BECAUSE YOU HAD BEEN DRINKING: MONTHLY
HOW OFTEN DURING THE LAST YEAR HAVE YOU FAILED TO DO WHAT WAS NORMALLY EXPECTED FROM YOU BECAUSE OF DRINKING: LESS THAN MONTHLY
HOW OFTEN DO YOU HAVE 6 OR MORE DRINKS ON ONE OCCASION: DAILY OR ALMOST DAILY
HOW OFTEN DURING THE LAST YEAR HAVE YOU HAD A FEELING OF GUILT OR REMORSE AFTER DRINKING: WEEKLY
ANXIETY: MILDLY ANXIOUS
HOW OFTEN DURING THE LAST YEAR HAVE YOU NEEDED AN ALCOHOLIC DRINK FIRST THING IN THE MORNING TO GET YOURSELF GOING AFTER A NIGHT OF HEAVY DRINKING: MONTHLY
ORIENTATION AND CLOUDING OF SENSORIUM: ORIENTED AND CAN DO SERIAL ADDITIONS
SUBSTANCE_ABUSE_PAST_12_MONTHS: NO
AUDIT TOTAL SCORE: 15
HOW MANY STANDARD DRINKS CONTAINING ALCOHOL DO YOU HAVE ON A TYPICAL DAY: 5 OR 6
TREMOR: NOT VISIBLE, BUT CAN BE FELT FINGERTIP TO FINGERTIP
AUDITORY DISTURBANCES: NOT PRESENT
HAS A RELATIVE, FRIEND, DOCTOR, OR ANOTHER HEALTH PROFESSIONAL EXPRESSED CONCERN ABOUT YOUR DRINKING OR SUGGESTED YOU CUT DOWN: YES, DURING THE LAST YEAR
HEADACHE, FULLNESS IN HEAD: NOT PRESENT
NAUSEA AND VOMITING: NO NAUSEA AND NO VOMITING
ANXIETY: NO ANXIETY, AT EASE
AUDIT-C TOTAL SCORE: 10
TOTAL SCORE: 2

## 2024-05-30 ASSESSMENT — PAIN SCALES - GENERAL
PAINLEVEL_OUTOF10: 0 - NO PAIN

## 2024-05-30 ASSESSMENT — COGNITIVE AND FUNCTIONAL STATUS - GENERAL
MOVING TO AND FROM BED TO CHAIR: A LITTLE
TOILETING: A LOT
PERSONAL GROOMING: A LITTLE
MOBILITY SCORE: 20
CLIMB 3 TO 5 STEPS WITH RAILING: TOTAL
HELP NEEDED FOR BATHING: A LITTLE
WALKING IN HOSPITAL ROOM: A LITTLE
TURNING FROM BACK TO SIDE WHILE IN FLAT BAD: A LOT
TOILETING: A LITTLE
PATIENT BASELINE BEDBOUND: NO
WALKING IN HOSPITAL ROOM: TOTAL
STANDING UP FROM CHAIR USING ARMS: A LITTLE
DAILY ACTIVITIY SCORE: 19
MOVING TO AND FROM BED TO CHAIR: A LOT
MOVING FROM LYING ON BACK TO SITTING ON SIDE OF FLAT BED WITH BEDRAILS: A LOT
DRESSING REGULAR UPPER BODY CLOTHING: A LITTLE
STANDING UP FROM CHAIR USING ARMS: A LOT
CLIMB 3 TO 5 STEPS WITH RAILING: A LITTLE
DRESSING REGULAR UPPER BODY CLOTHING: A LITTLE
DRESSING REGULAR LOWER BODY CLOTHING: A LITTLE
MOBILITY SCORE: 10
DAILY ACTIVITIY SCORE: 19
DRESSING REGULAR LOWER BODY CLOTHING: A LITTLE
HELP NEEDED FOR BATHING: A LITTLE

## 2024-05-30 ASSESSMENT — COLUMBIA-SUICIDE SEVERITY RATING SCALE - C-SSRS
2. HAVE YOU ACTUALLY HAD ANY THOUGHTS OF KILLING YOURSELF?: NO
6. HAVE YOU EVER DONE ANYTHING, STARTED TO DO ANYTHING, OR PREPARED TO DO ANYTHING TO END YOUR LIFE?: NO
1. IN THE PAST MONTH, HAVE YOU WISHED YOU WERE DEAD OR WISHED YOU COULD GO TO SLEEP AND NOT WAKE UP?: NO

## 2024-05-30 ASSESSMENT — PATIENT HEALTH QUESTIONNAIRE - PHQ9
1. LITTLE INTEREST OR PLEASURE IN DOING THINGS: NOT AT ALL
SUM OF ALL RESPONSES TO PHQ9 QUESTIONS 1 & 2: 0
2. FEELING DOWN, DEPRESSED OR HOPELESS: NOT AT ALL

## 2024-05-30 ASSESSMENT — PAIN - FUNCTIONAL ASSESSMENT
PAIN_FUNCTIONAL_ASSESSMENT: 0-10

## 2024-05-30 ASSESSMENT — ENCOUNTER SYMPTOMS
FEVER: 0
ABDOMINAL DISTENTION: 0
VOMITING: 0
ABDOMINAL PAIN: 0
SHORTNESS OF BREATH: 0

## 2024-05-30 NOTE — CARE PLAN
"  Problem: Skin  Goal: Decreased wound size/increased tissue granulation at next dressing change  Outcome: Progressing  Goal: Participates in plan/prevention/treatment measures  Outcome: Progressing  Goal: Prevent/manage excess moisture  Outcome: Progressing  Goal: Prevent/minimize sheer/friction injuries  Outcome: Progressing  Goal: Promote/optimize nutrition  Outcome: Progressing  Goal: Promote skin healing  Outcome: Progressing     Problem: Fall/Injury  Goal: Not fall by end of shift  Outcome: Progressing  Goal: Be free from injury by end of the shift  Outcome: Progressing  Goal: Verbalize understanding of personal risk factors for fall in the hospital  Outcome: Progressing  Goal: Verbalize understanding of risk factor reduction measures to prevent injury from fall in the home  Outcome: Progressing  Goal: Use assistive devices by end of the shift  Outcome: Progressing  Goal: Pace activities to prevent fatigue by end of the shift  Outcome: Progressing   The patient's goals for the shift include \"Get some sleep\"    The clinical goals for the shift include Pt will be free of falls    Over the shift, the patient did not make progress toward the following goals. Barriers to progression include . Recommendations to address these barriers include .    "

## 2024-05-30 NOTE — PROGRESS NOTES
"Subjective Data:  Patient resting, no complaints, reports she feels back to baseline.    Overnight Events:    None reported     Objective Data:  Last Recorded Vitals:  Vitals:    24 0037 24 0423 24 0725 24 0837   BP: 127/60 97/60 102/57    BP Location: Right arm Right arm Left arm    Patient Position: Lying Lying Lying    Pulse: 102 98 98    Resp: 18 18 18    Temp: 37.3 °C (99.1 °F) 37.3 °C (99.1 °F) 36.7 °C (98 °F)    TempSrc: Temporal Temporal Temporal    SpO2: 98% 97% 95% 96%   Weight: 60.1 kg (132 lb 6.4 oz)      Height: 1.651 m (5' 5\")        Medical Gas Therapy: None (Room air)  O2 Delivery Method: Nasal cannula  Weight  Av.4 kg (128 lb 11.2 oz)  Min: 56.7 kg (125 lb)  Max: 60.1 kg (132 lb 6.4 oz)    LABS:  CMP:  Results from last 7 days   Lab Units 24  0509 24  1250 24  0640 24  0550 24  0752 24  1252   SODIUM mmol/L 135* 134* 137 134* 138 132*   POTASSIUM mmol/L 3.6 3.3* 3.2* 3.2* 2.6* 3.3*   CHLORIDE mmol/L 104 97* 101 99 98 94*   CO2 mmol/L 24 27 29 26 27 18*   ANION GAP mmol/L 11 13 10 12 16 23*   BUN mg/dL 14 21 13 13 12 16   CREATININE mg/dL 0.56 0.79 0.43* 0.46* 0.52 0.69   EGFR mL/min/1.73m*2 >90 80 >90 >90 >90 >90   MAGNESIUM mg/dL 1.70 1.10*  --  1.60 1.40* 1.30*   ALBUMIN g/dL  --  3.4 3.0* 3.2* 3.2* 3.8   ALT U/L  --  35 38 35 35 42   AST U/L  --  40* 74* 82* 82* 106*   BILIRUBIN TOTAL mg/dL  --  2.0* 1.6* 2.3* 3.5* 4.8*     CBC:  Results from last 7 days   Lab Units 24  0509 24  1250 24  0640 24  0550 24  0752 24  1252   WBC AUTO x10*3/uL 10.1 13.2* 6.9 7.3 5.1 4.5   HEMOGLOBIN g/dL 7.3* 9.8* 9.9* 9.9* 10.5* 12.2   HEMATOCRIT % 21.5* 28.2* 27.2* 26.4* 28.0* 33.1*   PLATELETS AUTO x10*3/uL 172 192 92* 83* 62* 59*   MCV fL 102* 103* 99 97 95 98     COAG:   Results from last 7 days   Lab Units 24  1250 24  1421 24  1252   INR  1.2* 1.0 1.0     ABO: No results found for: " "\"ABO\"  HEME/ENDO:  Results from last 7 days   Lab Units 05/24/24  0752   FERRITIN ng/mL 3,541*   TSH mIU/L 3.38   HEMOGLOBIN A1C % 4.1      CARDIAC:   Results from last 7 days   Lab Units 05/29/24  1344 05/29/24  1250 05/23/24  2043 05/23/24  1252   TROPHS ng/L 209* 263* 12 15*   BNP pg/mL  --   --   --  120*      Results from last 7 days   Lab Units 05/24/24  0752   HEMOGLOBIN A1C % 4.1        Last I/O:    Intake/Output Summary (Last 24 hours) at 5/30/2024 1028  Last data filed at 5/30/2024 0833  Gross per 24 hour   Intake 513.69 ml   Output --   Net 513.69 ml     Net IO Since Admission: 513.69 mL [05/30/24 1028]            Inpatient Medications:  Scheduled medications   Medication Dose Route Frequency    apixaban  5 mg oral BID    cefTRIAXone  1 g intravenous q24h    tiotropium  2 puff inhalation Daily    And    fluticasone furoate-vilanteroL  1 puff inhalation Daily    folic acid  1 mg oral Daily    lactated Ringer's  250 mL intravenous Once    multivitamin with minerals  1 tablet oral Daily    perflutren lipid microspheres  0.5-10 mL of dilution intravenous Once in imaging    [START ON 6/1/2024] thiamine  100 mg oral Daily    thiamine  100 mg intravenous Daily     PRN medications   Medication    ipratropium-albuteroL     Continuous Medications   Medication Dose Last Rate    amiodarone  0.5-1 mg/min 0.5 mg/min (05/30/24 0300)       Physical Exam:  General: Pleasant elderly female, alert and oriented x 3, NAD  HEENT:  Pupils equal and reactive.  Normocephalic.  Moist mucosa.    Neck:  No thyromegaly.  Normal Jugular Venous Pressure.  Cardiovascular: Irregularly irregular rhythm. No cardiac murmurs noted.  Pulmonary:  Clear to auscultation bilaterally.  Abdomen:  Soft. Non-tender.   Non-distended.  Positive bowel sounds.  Lower Extremities:  2+ pedal pulses. No LE edema.  Neurologic:  Cranial nerves intact.  No focal deficit.   Skin: Skin warm and dry, normal skin turgor.   Psychiatric: Normal affect.   "   Assessment/Plan   Svitlana Perry is a 74 y.o. female with a past medical history of alcoholic cirrhosis of the liver c/b ascites and hepatic encephalopathy, alcoholic hepatitis(6/2020), COPD, hypertension, hyperlipidemia, pancreatitis, PVD. Recent admit 5/23-5/27/24 for generalized weakness (found covered in her feces)/ COPD exac and FTT. Patient presents today to the ED for further evaluation of syncope and collapsing, found to be in atrial fibrillation RVR.  Cardiology consulted for further evaluation of atrial fibrillation RVR.     During ER course, she was treated with IV Metoprolol ( 2.5 mg X 2 followed by 5 mg X3 with additional fluid resusication. She was transferred for CT-H given fall with + LOC (CTH revealed no ICH or skull fractures). When she returned from CT Study, blood pressure ~ 72 and HR ~ 140s. ER proceeded with Cardioversion due to hemodynamic instability with apparent termination of atrial fibrillation into Sinus with PAC's.      I reviewed telemetry, SR, short burst of AF, Currently SR.    1.  New asymptomatic atrial fibrillation RVR. Prakash score 3  -TSH 3.38 (5/24/24)  -Discussed R/B of starting OAC for stroke prevention, pt. was in agreement with proceeding w/ DOAC Eliquis 5mg PO BID if indicated     2.  Syncope and collapse.  Unclear etiology.  Probable 2/2 AF RVR  Patient denies any complaints of chest pain, dyspnea, palpitations prior to syncopal event  She reports the day after syncopal event her neighbor noted brief uncontrollable body movements>?  Seizure activity  -Echo pending  - D Dimer elevated 5/23/24, CT Angio of chest was neg for PE  -Holter monitor prior to discharge> to evaluate AF burden and any other probable arrhythmias     3.  Hypertension.  Currently BP soft, monitoring     4. Electrolyte imbalance: Hypokalemia and hypomagnesemia  K 3.3, Mag 1.10 >supplemented     5.  Leukocytosis> for monitoring/evaluation defer to primary  on IV antibiotic therapy         Recommendations  Patient currently SR after Cardioversion in the ED  Transition to Eliquis 5 mg PO BID today  C/w amiodarone infusion> monitor response  When Amio infusion complete today at 3p.m;  will plan for Amiodarone loading as follows:  Amiodarone 400 mg PO BID x 10 days (last dose 6/9/24)  Then  Amiodarone 200 mg PO every other day thereafter (starting 6/10/24)  Hepatic Panel in 1 week  Monitor electrolytes> keep K greater than 4.0 and mag greater than 2.0, supplement as needed  Echocardiogram pending  Cardiology follow up with Dr. Laura 3-5 weeks post discharge       Code Status:  Full Code      Jillian Aviles, APRN-CNP    =======================================================  Attending Note   =======================================================  Both the BRADY and I have had a face to face encounter with the patient today. I have examined the patient and edited the documented physical examination as necessary.  I personally reviewed the patient's vital signs, telemetry, recent labs, medications, orders, EKGs, and pertinent cardiac imaging/ echocardiography.  I have reviewed the BRADY's encounter note, approve the BRADY's documentation and have edited the note to reflect my diagnostic and therapeutic plan.    Svitlana Perry is a 74 y.o. female with a past medical history of alcoholic cirrhosis of the liver c/b ascites and hepatic encephalopathy, alcoholic hepatitis(6/2020), COPD, hypertension, hyperlipidemia, pancreatitis, PVD. Recent admit 5/23-5/27/24 for generalized weakness (found covered in her feces)/ COPD exac and FTT. Patient presents today to the ED for further evaluation of syncope and collapsing, found to be in atrial fibrillation RVR. During ER course, she was treated with IV Metoprolol ( 2.5 mg X 2 followed by 5 mg X3 with additional fluid resusication. She was transferred for CT-H given fall with TLOC. When she returned from CT Study, blood pressure ~ 72 and HR ~ 140s. ER proceeded with  Cardioversion due to hemodynamic instability with apparent termination of atrial fibrillation into Sinus with PAC's ( Repeated EKG Pending)      -- Heparin Infusion for anticoagulation at this time --> Transition to Apixaban 5 mg BID  -- Load Amiodarone  ( 1 mg / min X 6 hours followed by 0.5 mg / min)   ++ Once bag complete, transition to oral amiodarone 400 mg daily x 10 days.  Thereafter, 200 mg every other day  -- Potassium > 4.5, Mag > 2.2    -- We will obtain a transthoracic echocardiogram for structural evaluation including ejection fraction, assessment of regional wall motion abnormalities or valvular disease, and further evaluation of hemodynamics.  -- The patient will benefit from discharge with a Zio or Preventice Patch event monitor for 2 weeks.  Zio or Preventice Patch can only be placed by [facility] nursing during regular business hours (9AM-4PM Monday-Friday) after the discharge order has been placed.  The results will be discussed at the patient's followup visit.      The patient will benefit from follow-up in Cardiology clinic within 3-5 weeks of discharge.  The patient or their family should call the [facility] at [phone] for [facility].  If the care team is assisting in scheduling a follow up appointment prior to their discharge, please ensure that the patient has committed to attending the scheduled date and time.    Thank you for allowing me to participate in their care.  Please feel free to call me with any further questions or concerns.    Mike Laura DO   Clinical Cardology   [facility]  [facility]

## 2024-05-30 NOTE — PROGRESS NOTES
Physical Therapy    Physical Therapy Evaluation    Patient Name: Svitlana Perry  MRN: [id number]  Today's Date: 5/30/2024   Time Calculation  Start Time: 0952  Stop Time: 1026  Time Calculation (min): 34 min    Assessment/Plan   PT Assessment  PT Assessment Results: Decreased strength, Decreased mobility, Impaired balance, Decreased safety awareness  Rehab Prognosis: Good  Barriers to Discharge: Pending stair training with 3 steps to access home  Evaluation/Treatment Tolerance: Patient tolerated treatment well  Medical Staff Made Aware: Yes  Strengths: Access to adaptive/assistive products, Support of extended family/friends  Barriers to Participation:  (None identified)  End of Session Communication: Bedside nurse  Assessment Comment: Pt presents today with good BLE strength, fair balance, and Hx of recent fall. Pt would benefit from continued PT to imporove upon the above factors as well as for stair training. Continued PT would maximize pt's independence with functional mobility while reducing fall risk.  End of Session Patient Position: Up in chair, Alarm on  IP OR SWING BED PT PLAN  Inpatient or Swing Bed: Inpatient  PT Plan  Treatment/Interventions: Bed mobility, Transfer training, Gait training, Stair training, Balance training, Strengthening, Endurance training, Therapeutic exercise, Therapeutic activity, Home exercise program, Postural re-education  PT Plan: Skilled PT  PT Frequency: 3 times per week  PT Discharge Recommendations: Low intensity level of continued care  Equipment Recommended upon Discharge: Wheeled walker  PT Recommended Transfer Status: Assist x1, Assistive device  PT - OK to Discharge: Yes (Per PT POC)    Subjective   General Visit Information:  General  Reason for Referral: 73 y/o F presenting with dizziness, s/p fall, and is admitted for a-fib  Referred By: BRIGIDA Marques (APRN-CNP)  Past Medical History Relevant to Rehab: HLD, HTN, PAD, RA, COPD, neuropathy  Family/Caregiver Present:  No  Co-Treatment: OT  Co-Treatment Reason: Co-evaluation with OT to maximize pt safety, transfer ability and participation  Prior to Session Communication: Bedside nurse  Patient Position Received: Bed, 3 rail up, Alarm on  Preferred Learning Style: auditory, kinesthetic, visual  General Comment: Pt supine in bed upon PT arrival. Cleared to participate with RN, and agreeable to co-evaluation with PT/OT.  Home Living:  Home Living  Type of Home: Mobile home  Lives With: Alone  Home Adaptive Equipment: Walker rolling or standard, Cane  Home Layout: One level  Home Access: Stairs to enter with rails  Entrance Stairs-Rails: Left  Entrance Stairs-Number of Steps: 3  Bathroom Shower/Tub: Tub/shower unit  Bathroom Toilet: Standard  Bathroom Equipment:  (Shower chair)  Bathroom Accessibility: Pt does not step into tub to bathe  Prior Level of Function:  Prior Function Per Pt/Caregiver Report  Level of Secor: Independent with ADLs and functional transfers, Independent with homemaking with ambulation  Receives Help From: Neighbor  Bath: Sponge bathing (Leaning over tub in tub/shower. Pt reports fear of not being able to get out of the tub)  Homemaking Assistance: Independent (Pt IND at baseline, but has received assist from neighbor recently)  Ambulatory Assistance: Independent (Mod IND with walker in home)  Prior Function Comments: +Driving  Precautions:  Precautions  Medical Precautions: Fall precautions    Objective   Pain:  Pain Assessment  Pain Assessment: 0-10  Pain Score: 0 - No pain  Cognition:  Cognition  Orientation Level:  (Oriented to person, place, and time)  Insight: Mild    General Assessments:  Activity Tolerance  Endurance: Endurance does not limit participation in activity    Sensation  Sensation Comment: Pt reports PMHx of neuropathy in the feet/hands    Coordination  Movements are Fluid and Coordinated: Yes    Postural Control  Trunk Control: Trunk flexion in standing with FWW support    Static  Sitting Balance  Static Sitting-Balance Support: Bilateral upper extremity supported, Feet supported  Static Sitting-Level of Assistance: Close supervision  Dynamic Sitting Balance  Dynamic Sitting-Balance Support: Feet supported  Dynamic Sitting-Balance: Lateral lean  Dynamic Sitting-Comments: During toileting for hygiene    Static Standing Balance  Static Standing-Balance Support: Bilateral upper extremity supported  Static Standing-Level of Assistance: Contact guard  Static Standing-Comment/Number of Minutes: With FWW support  Dynamic Standing Balance  Dynamic Standing-Balance Support: Bilateral upper extremity supported  Dynamic Standing-Balance: Reaching for objects  Dynamic Standing-Comments: CGA with FWW support. Pt able to pull up brief in standing and single UE supported on the walker.  Functional Assessments:  Bed Mobility  Bed Mobility: Yes  Bed Mobility 1  Bed Mobility 1: Supine to sitting  Level of Assistance 1: Independent  Bed Mobility Comments 1: Pt able to progress BLE off the EOB without use of bed rails.    Transfers  Transfer: Yes  Transfer 1  Transfer From 1: Bed to  Transfer to 1: Stand  Technique 1: Sit to stand  Transfer Device 1: Walker, Gait belt  Transfer Level of Assistance 1: Contact guard, Minimal verbal cues  Trials/Comments 1: VC for foot placement on floor and BUE push from bed instead of pulling from the walker. VC for hand placement on walker in standing  Transfers 2  Transfer From 2: Stand to  Transfer to 2: Toilet  Technique 2: Stand to sit  Transfer Device 2: Walker, Gait belt  Transfer Level of Assistance 2: Minimum assistance, Minimal verbal cues  Trials/Comments 2: Assist with walker positioning before transfer. VC for RUE grab on grab bar near toilet to assist in ecentric descent. Assist required to prevent posterior LOB on descent to the toilet.  Transfers 3  Transfer From 3: Toilet to  Transfer to 3: Stand  Technique 3: Sit to stand  Transfer Device 3: Walker, Gait  belt  Transfer Level of Assistance 3: Minimum assistance, Minimal verbal cues  Trials/Comments 3: VC for UE grab on grab bar near toilet to help ascend from seat. VC fro hand placement on walker in standing.  Transfers 4  Transfer From 4: Stand to  Transfer to 4: Chair with arms  Technique 4: Stand to sit  Transfer Device 4: Walker, Gait belt  Transfer Level of Assistance 4: Contact guard, Minimal verbal cues  Trials/Comments 4: VC for BLE positioning against chair before attempting to sit. VC to keep walker in close proximity during transfer. VC for BUE reach for armrests of the chair when standing.    Ambulation/Gait Training  Ambulation/Gait Training Performed: Yes  Ambulation/Gait Training 1  Surface 1: Level tile  Device 1: Rolling walker  Gait Support Devices: Gait belt  Assistance 1: Contact guard, Minimal verbal cues  Comments/Distance (ft) 1: 12 ft x 2 Fair hayden and MARK with decreased step length. VC for forward gaze. Pt denies dizziness during trial. No overt LOB noted    Stairs  Stairs: No  Extremity/Trunk Assessments:  RLE   RLE :  (Grossly 4/5 MMT)  LLE   LLE :  (Grossly 4/5 MMT)  Outcome Measures:  [facility] Basic Mobility  Turning from your back to your side while in a flat bed without using bedrails: None  Moving from lying on your back to sitting on the side of a flat bed without using bedrails: None  Moving to and from bed to chair (including a wheelchair): A little  Standing up from a chair using your arms (e.g. wheelchair or bedside chair): A little  To walk in hospital room: A little  Climbing 3-5 steps with railing: A little  Basic Mobility - Total Score: 20    Encounter Problems       Encounter Problems (Active)       Balance       Goal 1 (Progressing)       Start:  05/30/24    Expected End:  06/13/24       Pt performs all sitting balance with supervision and standing balance with close supervision using LRAD            Mobility       STG - Patient will navigate 4-6 steps with left HR support  and close supervision (Not Progressing)       Start:  05/30/24    Expected End:  06/13/24            STG - Patient will ambulate (Progressing)       Start:  05/30/24    Expected End:  06/13/24       100 ft with close supervision using LRAD            PT Problem       PT Goal 1 (Not Progressing)       Start:  05/30/24    Expected End:  06/13/24       PT demonstrates IND in performing 2 sets of 15 reps of prescribed BLE HEP            PT Transfers       STG - Patient will transfer sit to and from stand (Progressing)       Start:  05/30/24    Expected End:  06/13/24       With close supervision using LRAD              Education Documentation  Body Mechanics, taught by Berhane Laird PT at 5/30/2024 12:07 PM.  Learner: Patient  Readiness: Acceptance  Method: Explanation, Demonstration  Response: Demonstrated Understanding    Mobility Training, taught by Berhane Laird PT at 5/30/2024 12:07 PM.  Learner: Patient  Readiness: Acceptance  Method: Explanation, Demonstration  Response: Demonstrated Understanding    Education Comments  No comments found.

## 2024-05-30 NOTE — CARE PLAN
"The patient's goals for the shift include \"Get some sleep\"    The clinical goals for the shift include Pt will be free of falls      Problem: Skin  Goal: Decreased wound size/increased tissue granulation at next dressing change  Outcome: Progressing  Goal: Participates in plan/prevention/treatment measures  Outcome: Progressing  Goal: Prevent/manage excess moisture  Outcome: Progressing  Goal: Prevent/minimize sheer/friction injuries  Outcome: Progressing  Goal: Promote/optimize nutrition  Outcome: Progressing  Goal: Promote skin healing  Outcome: Progressing     Problem: Fall/Injury  Goal: Not fall by end of shift  Outcome: Progressing  Goal: Be free from injury by end of the shift  Outcome: Progressing  Goal: Verbalize understanding of personal risk factors for fall in the hospital  Outcome: Progressing  Goal: Verbalize understanding of risk factor reduction measures to prevent injury from fall in the home  Outcome: Progressing  Goal: Use assistive devices by end of the shift  Outcome: Progressing  Goal: Pace activities to prevent fatigue by end of the shift  Outcome: Progressing     Problem: Pain  Goal: My pain/discomfort is manageable  Outcome: Progressing     Problem: Safety  Goal: Patient will be injury free during hospitalization  Outcome: Progressing  Goal: I will remain free of falls  Outcome: Progressing     Problem: Daily Care  Goal: Daily care needs are met  Outcome: Progressing     Problem: Psychosocial Needs  Goal: Demonstrates ability to cope with hospitalization/illness  Outcome: Progressing  Goal: Collaborate with me, my family, and caregiver to identify my specific goals  Outcome: Progressing     Problem: Discharge Barriers  Goal: My discharge needs are met  Outcome: Progressing       "

## 2024-05-30 NOTE — PROGRESS NOTES
[facility] Hospitalist Progress Note        Between 7AM-7PM please message me via Epic Secure Chat.  After 7PM please page Nocturnist on call.        Assessment/Plan     #A fib w/ RVR  - appreciate cardiology  - cardioverted in ER  - continue amio, Eliquis    #anemia  - monitor    #sepsis rule out  - blood cx and urine cx pending  - can continue CTX for now    #hypoK/hypoMg  - replaced    #alcohol use disorder  - reported no drinking since most recent discharge  - monitor for signs or sx of withdrawal  - continue thiamine, folic acid    #hx COPD not in exacerbation, not on O2  - continue inhalers    #Elevated bili, LFTs, thrombocytopenia  - suspected from liver disease and alcohol use  - needs GI follow up    #falls  #deconditioning  - PT/OT    #malnutrition reported  - ensure ordered    DVT Prophylaxis:  Eliquis      Discharge Planning: Pending PT/OT    I personally examined the patient and reviewed chart.  Plan of care was discussed with patient, all questions answered.    Total time spent: At least 38 minutes, providing counseling or in coordination of care. Total time on this day of visit includes record and documentation review before and after visit including documentation and time not explicitly included on EMR time stamp.      Subjective     Svitlana Perry is a 74 y.o. female on day 1 of admission presenting with Atrial fibrillation with RVR (Multi).    NAEON. Overall stable, does not complain of new acute issues. Up in chair today    Review of Systems   Constitutional:  Negative for fever.   Respiratory:  Negative for shortness of breath.    Cardiovascular:  Negative for chest pain.   Gastrointestinal:  Negative for abdominal distention, abdominal pain and vomiting.       Objective     Physical Exam  Constitutional:       General: She is not in acute distress.  Cardiovascular:      Rate and Rhythm: Normal rate and regular rhythm.   Pulmonary:      Effort: Pulmonary effort is normal.      Breath sounds: Normal  "breath sounds.   Abdominal:      General: There is no distension.      Palpations: Abdomen is soft.   Neurological:      Mental Status: She is alert. Mental status is at baseline.         Last Recorded Vitals  Blood pressure 109/53, pulse 83, temperature 36.6 °C (97.9 °F), temperature source Skin, resp. rate 16, height 1.651 m (5' 5\"), weight 60.1 kg (132 lb 6.4 oz), SpO2 93%.  Intake/Output last 3 Shifts:  I/O last 3 completed shifts:  In: 273.7 (4.6 mL/kg) [I.V.:273.7 (4.6 mL/kg)]  Out: - (0 mL/kg)   Weight: 60.1 kg     Relevant Results  Results for orders placed or performed during the hospital encounter of 05/29/24 (from the past 24 hour(s))   Blood Culture    Specimen: Peripheral Venipuncture; Blood culture   Result Value Ref Range    Blood Culture Loaded on Instrument - Culture in progress    Blood Culture    Specimen: Peripheral Venipuncture; Blood culture   Result Value Ref Range    Blood Culture Loaded on Instrument - Culture in progress    CBC and Auto Differential   Result Value Ref Range    WBC 13.2 (H) 4.4 - 11.3 x10*3/uL    nRBC 0.0 0.0 - 0.0 /100 WBCs    RBC 2.73 (L) 4.00 - 5.20 x10*6/uL    Hemoglobin 9.8 (L) 12.0 - 16.0 g/dL    Hematocrit 28.2 (L) 36.0 - 46.0 %     (H) 80 - 100 fL    MCH 35.9 (H) 26.0 - 34.0 pg    MCHC 34.8 32.0 - 36.0 g/dL    RDW 15.3 (H) 11.5 - 14.5 %    Platelets 192 150 - 450 x10*3/uL    Neutrophils % 77.8 40.0 - 80.0 %    Immature Granulocytes %, Automated 1.2 (H) 0.0 - 0.9 %    Lymphocytes % 11.0 13.0 - 44.0 %    Monocytes % 9.1 2.0 - 10.0 %    Eosinophils % 0.4 0.0 - 6.0 %    Basophils % 0.5 0.0 - 2.0 %    Neutrophils Absolute 10.32 (H) 1.60 - 5.50 x10*3/uL    Immature Granulocytes Absolute, Automated 0.16 0.00 - 0.50 x10*3/uL    Lymphocytes Absolute 1.45 0.80 - 3.00 x10*3/uL    Monocytes Absolute 1.20 (H) 0.05 - 0.80 x10*3/uL    Eosinophils Absolute 0.05 0.00 - 0.40 x10*3/uL    Basophils Absolute 0.06 0.00 - 0.10 x10*3/uL   Magnesium   Result Value Ref Range    " Magnesium 1.10 (L) 1.60 - 2.40 mg/dL   Comprehensive metabolic panel   Result Value Ref Range    Glucose 126 (H) 74 - 99 mg/dL    Sodium 134 (L) 136 - 145 mmol/L    Potassium 3.3 (L) 3.5 - 5.3 mmol/L    Chloride 97 (L) 98 - 107 mmol/L    Bicarbonate 27 21 - 32 mmol/L    Anion Gap 13 10 - 20 mmol/L    Urea Nitrogen 21 6 - 23 mg/dL    Creatinine 0.79 0.50 - 1.05 mg/dL    eGFR 80 >60 mL/min/1.73m*2    Calcium 8.5 (L) 8.6 - 10.3 mg/dL    Albumin 3.4 3.4 - 5.0 g/dL    Alkaline Phosphatase 269 (H) 33 - 136 U/L    Total Protein 6.0 (L) 6.4 - 8.2 g/dL    AST 40 (H) 9 - 39 U/L    Bilirubin, Total 2.0 (H) 0.0 - 1.2 mg/dL    ALT 35 7 - 45 U/L   Protime-INR   Result Value Ref Range    Protime 13.3 (H) 9.8 - 12.8 seconds    INR 1.2 (H) 0.9 - 1.1   Troponin I, High Sensitivity, Initial   Result Value Ref Range    Troponin I, High Sensitivity 263 (HH) 0 - 13 ng/L   ECG 12 lead   Result Value Ref Range    Ventricular Rate 167 BPM    QRS Duration 64 ms    QT Interval 246 ms    QTC Calculation(Bazett) 410 ms    R Axis 71 degrees    T Axis 250 degrees    QRS Count 28 beats    Q Onset 225 ms    T Offset 348 ms    QTC Fredericia 346 ms   Urinalysis with Reflex Culture and Microscopic   Result Value Ref Range    Color, Urine Yellow Light-Yellow, Yellow, Dark-Yellow    Appearance, Urine Turbid (N) Clear    Specific Gravity, Urine 1.015 1.005 - 1.035    pH, Urine 6.5 5.0, 5.5, 6.0, 6.5, 7.0, 7.5, 8.0    Protein, Urine 10 (TRACE) NEGATIVE, 10 (TRACE), 20 (TRACE) mg/dL    Glucose, Urine Normal Normal mg/dL    Blood, Urine NEGATIVE NEGATIVE    Ketones, Urine NEGATIVE NEGATIVE mg/dL    Bilirubin, Urine NEGATIVE NEGATIVE    Urobilinogen, Urine 8 (3+) (A) Normal mg/dL    Nitrite, Urine NEGATIVE NEGATIVE    Leukocyte Esterase, Urine 75 Samina/µL (A) NEGATIVE   Microscopic Only, Urine   Result Value Ref Range    WBC, Urine 6-10 (A) 1-5, NONE /HPF    RBC, Urine 1-2 NONE, 1-2, 3-5 /HPF    Squamous Epithelial Cells, Urine 1-9 (SPARSE) Reference range  not established. /HPF    Bacteria, Urine 4+ (A) NONE SEEN /HPF    Mucus, Urine FEW Reference range not established. /LPF   Troponin, High Sensitivity, 1 Hour   Result Value Ref Range    Troponin I, High Sensitivity 209 (HH) 0 - 13 ng/L   BLOOD GAS VENOUS FULL PANEL   Result Value Ref Range    POCT pH, Venous 7.39 7.33 - 7.43 pH    POCT pCO2, Venous 44 41 - 51 mm Hg    POCT pO2, Venous 21 (L) 35 - 45 mm Hg    POCT SO2, Venous 21 (L) 45 - 75 %    POCT Oxy Hemoglobin, Venous 20.5 (L) 45.0 - 75.0 %    POCT Hematocrit Calculated, Venous 26.0 (L) 36.0 - 46.0 %    POCT Sodium, Venous 132 (L) 136 - 145 mmol/L    POCT Potassium, Venous 3.2 (L) 3.5 - 5.3 mmol/L    POCT Chloride, Venous 100 98 - 107 mmol/L    POCT Ionized Calicum, Venous 1.20 1.10 - 1.33 mmol/L    POCT Glucose, Venous 122 (H) 74 - 99 mg/dL    POCT Lactate, Venous 2.0 0.4 - 2.0 mmol/L    POCT Base Excess, Venous 1.4 -2.0 - 3.0 mmol/L    POCT HCO3 Calculated, Venous 26.6 (H) 22.0 - 26.0 mmol/L    POCT Hemoglobin, Venous 8.8 (L) 12.0 - 16.0 g/dL    POCT Anion Gap, Venous 9.0 (L) 10.0 - 25.0 mmol/L    Patient Temperature 37.0 degrees Celsius    FiO2 21 %   Ethanol   Result Value Ref Range    Alcohol <10 <=10 mg/dL   ECG 12 lead   Result Value Ref Range    Ventricular Rate 106 BPM    Atrial Rate 106 BPM    NJ Interval 140 ms    QRS Duration 68 ms    QT Interval 306 ms    QTC Calculation(Bazett) 406 ms    P Axis 82 degrees    R Axis 86 degrees    T Axis 92 degrees    QRS Count 18 beats    Q Onset 225 ms    P Onset 155 ms    P Offset 209 ms    T Offset 378 ms    QTC Fredericia 369 ms   Heparin Assay, UFH   Result Value Ref Range    Heparin Unfractionated 1.1 (HH) See Comment Below for Therapeutic Ranges IU/mL   Heparin Assay, UFH   Result Value Ref Range    Heparin Unfractionated 0.2 See Comment Below for Therapeutic Ranges IU/mL   Heparin Assay, UFH   Result Value Ref Range    Heparin Unfractionated 0.3 See Comment Below for Therapeutic Ranges IU/mL   CBC   Result  Value Ref Range    WBC 10.1 4.4 - 11.3 x10*3/uL    nRBC 0.0 0.0 - 0.0 /100 WBCs    RBC 2.11 (L) 4.00 - 5.20 x10*6/uL    Hemoglobin 7.3 (L) 12.0 - 16.0 g/dL    Hematocrit 21.5 (L) 36.0 - 46.0 %     (H) 80 - 100 fL    MCH 34.6 (H) 26.0 - 34.0 pg    MCHC 34.0 32.0 - 36.0 g/dL    RDW 14.9 (H) 11.5 - 14.5 %    Platelets 172 150 - 450 x10*3/uL   Basic metabolic panel   Result Value Ref Range    Glucose 107 (H) 74 - 99 mg/dL    Sodium 135 (L) 136 - 145 mmol/L    Potassium 3.6 3.5 - 5.3 mmol/L    Chloride 104 98 - 107 mmol/L    Bicarbonate 24 21 - 32 mmol/L    Anion Gap 11 10 - 20 mmol/L    Urea Nitrogen 14 6 - 23 mg/dL    Creatinine 0.56 0.50 - 1.05 mg/dL    eGFR >90 >60 mL/min/1.73m*2    Calcium 7.5 (L) 8.6 - 10.3 mg/dL   Magnesium   Result Value Ref Range    Magnesium 1.70 1.60 - 2.40 mg/dL   Heparin Assay, UFH   Result Value Ref Range    Heparin Unfractionated 0.2 See Comment Below for Therapeutic Ranges IU/mL   Heparin Assay, UFH   Result Value Ref Range    Heparin Unfractionated 0.5 See Comment Below for Therapeutic Ranges IU/mL       Imaging Results  ECG 12 lead    Result Date: 5/29/2024  Sinus tachycardia with occasional Premature ventricular complexes Nonspecific T wave abnormality Abnormal ECG When compared with ECG of 29-MAY-2024 12:34, Sinus rhythm has replaced Atrial fibrillation Vent. rate has decreased BY  61 BPM ST no longer depressed in Anterior leads Nonspecific T wave abnormality has replaced inverted T waves in Inferior leads Nonspecific T wave abnormality has replaced inverted T waves in Anterolateral leads See ED provider note for full interpretation and clinical correlation Confirmed by Amber Roth ([id number]) on 5/29/2024 6:19:14 PM    CT cervical spine wo IV contrast    Result Date: 5/29/2024  Interpreted By:  Schoenberger, Joseph, STUDY: CT CERVICAL SPINE WO IV CONTRAST;  5/29/2024 2:51 pm   INDICATION: Signs/Symptoms:fall.   COMPARISON: None.   ACCESSION NUMBER(S): [id number]    ORDERING CLINICIAN: LOLY KNOX   TECHNIQUE: Axial CT images of the cervical spine are obtained. Axial, coronal and sagittal reconstructions are provided for review.   FINDINGS:     Fractures: There is no evidence for an acute fracture of the cervical spine.   Vertebral Alignment: There is straightening of the normal cervical lordotic curve. The sagittal alignment of the vertebra is otherwise maintained.   Craniocervical Junction: The odontoid process and craniocervical junction are intact.   Vertebrae/Disc Spaces:  All cervical vertebra are normal in height without vertebral body fracture. There is moderate degenerative narrowing of the C5 disc with mild narrowing of the C6 disc. Other discs are maintained in height.   Prevertebral/Paraspinal Soft Tissues: The prevertebral and paraspinal soft tissues are unremarkable.   Posterior elements are aligned and intact other than some mild degenerative changes. There is no acute fracture or subluxation.       Degenerative changes without acute fracture or subluxation.   MACRO: None   Signed by: Joseph Schoenberger 5/29/2024 3:27 PM Dictation workstation:   [id number]    CT head wo IV contrast    Result Date: 5/29/2024  Interpreted By:  Schoenberger, Joseph, STUDY: CT HEAD WO IV CONTRAST;  5/29/2024 2:51 pm   INDICATION: Signs/Symptoms:fall.   COMPARISON: None.   ACCESSION NUMBER(S): [id number]   ORDERING CLINICIAN: LOLY KNOX   TECHNIQUE: Noncontrast axial CT scan of head was performed. Angled reformats in brain and bone windows were generated. The images were reviewed in bone, brain, blood and soft tissue windows.   FINDINGS: CSF Spaces: Enlarged due to parenchymal volume loss. Normal configuration with intact basal cisterns. There is no extraaxial fluid collection.   Parenchyma: Periventricular confluent deep white matter hypoattenuation consistent with small vessel ischemic white matter demyelination. The grey-white differentiation is intact. There is no  mass effect or midline shift.  There is no intracranial hemorrhage.   Calvarium: The calvarium is unremarkable.   Paranasal sinuses and mastoids: Visualized paranasal sinuses and mastoids are clear.       Atrophy and chronic ischemic white matter demyelination without acute intracranial finding.   No evidence of intracranial hemorrhage or displaced skull fracture.   MACRO: None   Signed by: Joseph Schoenberger 5/29/2024 3:25 PM Dictation workstation:   [id number]    ECG 12 lead    Result Date: 5/29/2024  Atrial fibrillation with rapid ventricular response ST & T wave abnormality, consider inferior ischemia ST & T wave abnormality, consider anterolateral ischemia Abnormal ECG When compared with ECG of 23-MAY-2024 12:06, Atrial fibrillation has replaced Sinus rhythm Vent. rate has increased BY  58 BPM Inverted T waves have replaced nonspecific T wave abnormality in Inferior leads Inverted T waves have replaced nonspecific T wave abnormality in Lateral leads See ED provider note for full interpretation and clinical correlation Confirmed by Amber Roth ([id number]) on 5/29/2024 3:05:44 PM    XR pelvis 1-2 views    Result Date: 5/29/2024  STUDY: Pelvis Radiographs; 05/29/2024 1:52PM INDICATION: Status post fall. COMPARISON: XR Pelvis 05/25/2020, CT Abdomen and Pelvis 05/25/2020. ACCESSION NUMBER(S): [id number] ORDERING CLINICIAN: LOLY KNOX TECHNIQUE:  One view(s) of the pelvis. FINDINGS:  The pelvic ring is intact.  There is no acute fracture.      No acute osseous abnormality. Moderate stool in the colon. Signed by Timbo Ghosh MD    XR chest 1 view    Result Date: 5/29/2024  STUDY: Chest Radiograph;  05/29/2024 1:52PM INDICATION: Status post fall. COMPARISON: XR Chest 05/23/2024 ACCESSION NUMBER(S): [id number] ORDERING CLINICIAN: LOLY KNOX TECHNIQUE:  Frontal chest was obtained at 13:51 hours. FINDINGS: CARDIOMEDIASTINAL SILHOUETTE: Cardiomediastinal silhouette is normal in size and  configuration.  LUNGS: Lungs are clear.  ABDOMEN: No remarkable upper abdominal findings.  BONES: No acute osseous changes.    No acute process. Signed by Timbo Ghosh MD      Medications:  [START ON 6/10/2024] amiodarone, 200 mg, oral, Every other day  amiodarone, 400 mg, oral, BID  apixaban, 5 mg, oral, BID  cefTRIAXone, 1 g, intravenous, q24h  tiotropium, 2 puff, inhalation, Daily   And  fluticasone furoate-vilanteroL, 1 puff, inhalation, Daily  folic acid, 1 mg, oral, Daily  lactated Ringer's, 250 mL, intravenous, Once  multivitamin with minerals, 1 tablet, oral, Daily  perflutren lipid microspheres, 0.5-10 mL of dilution, intravenous, Once in imaging  [START ON 6/1/2024] thiamine, 100 mg, oral, Daily  thiamine, 100 mg, intravenous, Daily       PRN medications: ipratropium-albuteroL       Garfield Gibbons MD  [facility]

## 2024-05-30 NOTE — PROGRESS NOTES
Transitional Care Coordination Progress Note:  Plan per Medical/Surgical team: treatment of weakness, afib RVR with IV ATB, IV amiodarone gtt, IV heparin gtt, Iv lopressor, CIWA, cardio consult, ECHO pending, PT/OT evals pending   Status: Inpatient   Payor source: [employer]   Discharge disposition: Home alone [facility]  Potential Barriers: ETOH abuse, trop 263, 209  ADOD: 6/1/2024   BELLA Damon RN, BSN Transitional Care Coordinator ED# [phone]      05/30/24 0647   Discharge Planning   Assistance Needed cardio consult, anticoagulation plan

## 2024-05-30 NOTE — PROGRESS NOTES
Occupational Therapy    Evaluation/Treatment    Patient Name: Svitlana Perry  MRN: [id number]  : 1950  Today's Date: 24  Time Calculation  Start Time: 953  Stop Time:   Time Calculation (min): 34 min       Assessment:  OT Assessment: Pt demos decreased balance, strength, endurnace and cognition/safety awareness resulting in decreased safety and independence with ADLs/IADLs. Pt requires skilled OT services to address above deficits to safely return to PLOF.  Prognosis: Good  Evaluation/Treatment Tolerance: Patient tolerated treatment well  Medical Staff Made Aware: Yes  End of Session Communication: Bedside nurse  End of Session Patient Position: Up in chair, Alarm on  OT Assessment Results: Decreased ADL status, Decreased upper extremity strength, Decreased safe judgment during ADL, Decreased cognition, Decreased endurance, Decreased functional mobility, Decreased IADLs, Decreased trunk control for functional activities  Prognosis: Good  Evaluation/Treatment Tolerance: Patient tolerated treatment well  Medical Staff Made Aware: Yes  Strengths: Support of extended family/friends, Access to adaptive/assistive products  Barriers to Participation: Insight into problems  Plan:  Treatment Interventions: ADL retraining, Functional transfer training, UE strengthening/ROM, Endurance training, Patient/family training, Equipment evaluation/education, Compensatory technique education  OT Frequency: 3 times per week  OT Discharge Recommendations: Low intensity level of continued care  Equipment Recommended upon Discharge: Wheeled walker  OT Recommended Transfer Status: Assist of 1  OT - OK to Discharge: Yes (continue per OT POC)  Treatment Interventions: ADL retraining, Functional transfer training, UE strengthening/ROM, Endurance training, Patient/family training, Equipment evaluation/education, Compensatory technique education    Subjective   General:   OT Received On: 24  General  Reason for  Referral: 73 y/o F presenting with dizziness, s/p fall, and is admitted for a-fib  Referred By: BRIGIDA Marques (APRN-CNP)  Past Medical History Relevant to Rehab: PVD, chronic alcohol use, hepatitis B, hypertension, hyperlipidemia, rheumatoid arthritis, leg edema, neuropathy, COPD pancreatitis, weakness and debility.   Family/Caregiver Present: No  Co-Treatment: PT  Co-Treatment Reason: to maximize safety and participation with skilled intervention  Prior to Session Communication: Bedside nurse  Patient Position Received: Bed, 3 rail up, Alarm on  General Comment: Pt supine in bed upon arrival and agreeable to OT eval/tx. Pt fully participatory, demos decreased safety awareness at times.  Precautions:  Medical Precautions: Fall precautions       Pain:  Pain Assessment  Pain Assessment: 0-10  Pain Score: 0 - No pain    Objective   Cognition:  Overall Cognitive Status: Within Functional Limits  Orientation Level: Oriented X4  Insight: Mild  Impulsive: Mildly           Home Living:  Type of Home: Mobile home  Lives With: Alone  Home Adaptive Equipment: Walker rolling or standard, Cane  Home Layout: One level  Home Access: Stairs to enter with rails  Entrance Stairs-Rails: Left  Entrance Stairs-Number of Steps: 3  Bathroom Shower/Tub: Tub/shower unit  Bathroom Toilet: Standard  Bathroom Equipment:  (pt reports owning shower chair but does not use)  Bathroom Accessibility: pt reports sponge bathing and standing over tub to wash hair d/t fear of performing tub transfer  Prior Function:  Level of [place]: Independent with ADLs and functional transfers, Independent with homemaking with ambulation  Receives Help From: Neighbor  ADL Assistance: Independent (per chart review wears depends at baseline d/t incontinence)  Homemaking Assistance: Independent (pt independent at baseline with IADLs but recently requiring assist from neighbor)  Ambulatory Assistance: Independent (using FWW)  Prior Function Comments: + driving. x1 recent  fall       ADL:  Grooming Deficit: Setup, Wash/dry hands (seated in chair)  LE Dressing Assistance:  (Min A to don/doff brief, SBA to don/doff socks)  Toileting Assistance with Device: Minimal  Toileting Deficit:  (assist for brief management post toileting, cues for safety awareness)  Activities of Daily Living: Grooming  Grooming Level of Assistance: Setup  Grooming Where Assessed: Chair  Grooming Comments: pt performed hand hygiene seated in chair    LE Dressing  LE Dressing: Yes  Sock Level of Assistance: Close supervision, Minimal verbal cues  Adult Briefs Level of Assistance: Minimum assistance, Minimal verbal cues  LE Dressing Where Assessed: Bed level, Edge of bed  LE Dressing Comments: cues for sequencing and safety awareness, assist to doff soiled brief    Toileting  Toileting Level of Assistance: Minimum assistance, Minimal verbal cues  Where Assessed: Toilet  Toileting Comments: cues for safety awareness, assist for brief management minimally up over hips d/t x1 mild posterior LOB in standing  Activity Tolerance:  Endurance: Endurance does not limit participation in activity  Functional Standing Tolerance:     Bed Mobility/Transfers: Bed Mobility  Bed Mobility: Yes  Bed Mobility 1  Bed Mobility 1: Supine to sitting  Level of Assistance 1: Independent  Bed Mobility Comments 1: HOB flat    Transfers  Transfer: Yes  Transfer 1  Technique 1: Sit to stand, Stand to sit  Transfer Device 1: Walker, Gait belt  Transfer Level of Assistance 1: Contact guard, Minimal verbal cues  Trials/Comments 1: cues for sequencing, safe hand placement and walker safety    Toilet Transfers  Toilet Transfer Type: To and from  Toilet Transfer to: Raised toilet seat with rails  Toilet Transfer Technique: Ambulating  Toilet Transfers: Minimal assistance  Toilet Transfers Comments: cues for sequencing, safe hand placement and walker safety, x1 mild posterior LOB during sit>stand from toilet without incident, assist to  correct    Functional Mobility:  Functional Mobility  Functional Mobility Performed: Yes  Functional Mobility 1  Comments 1: Pt functionally navigated x min household distance in room using FWW with CGA x1. Cues for walker safety. No LOB noted.  Sitting Balance:  Static Sitting Balance  Static Sitting-Balance Support: Bilateral upper extremity supported, Feet supported  Static Sitting-Level of Assistance:  (SUP)  Static Sitting-Comment/Number of Minutes: EOB  Dynamic Sitting Balance  Dynamic Sitting-Comments: SBA at EOB  Standing Balance:  Static Standing Balance  Static Standing-Balance Support: Bilateral upper extremity supported  Static Standing-Level of Assistance: Close supervision  Static Standing-Comment/Number of Minutes: FWW  Dynamic Standing Balance  Dynamic Standing-Comments: CGA using FWW      Sensation:  Sensation Comment: Pt reports PMHx of neuropathy in the feet/hands    Coordination:  Movements are Fluid and Coordinated: Yes        Extremities: RUE   RUE : Within Functional Limits and LUE   LUE: Within Functional Limits      Outcome Measures: [facility] Daily Activity  Putting on and taking off regular lower body clothing: A little  Bathing (including washing, rinsing, drying): A little  Putting on and taking off regular upper body clothing: A little  Toileting, which includes using toilet, bedpan or urinal: A little  Taking care of personal grooming such as brushing teeth: A little  Eating Meals: None  Daily Activity - Total Score: 19        Education Documentation  Body Mechanics, taught by Candelaria Babcock OT at 5/30/2024  1:31 PM.  Learner: Patient  Readiness: Acceptance  Method: Explanation  Response: Verbalizes Understanding, Needs Reinforcement    Precautions, taught by Candelaria Babcock OT at 5/30/2024  1:31 PM.  Learner: Patient  Readiness: Acceptance  Method: Explanation  Response: Verbalizes Understanding, Needs Reinforcement    ADL Training, taught by Candelaria Babcock OT at 5/30/2024  1:31  PM.  Learner: Patient  Readiness: Acceptance  Method: Explanation  Response: Verbalizes Understanding, Needs Reinforcement    Education Comments  No comments found.             Goals:  Encounter Problems       Encounter Problems (Active)       ADLs       Patient will perform UB and LB bathing with modified independent level of assistance and grab bars, shower chair, and long-handled sponge.       Start:  05/30/24    Expected End:  06/13/24            Patient with complete upper body dressing with modified independent level of assistance donning and doffing all UE clothes with PRN adaptive equipment.       Start:  05/30/24    Expected End:  06/13/24            Patient with complete lower body dressing with modified independent level of assistance donning and doffing all LE clothes  with PRN adaptive equipment.       Start:  05/30/24    Expected End:  06/13/24            Patient will complete daily grooming tasks with modified independent level of assistance and PRN adaptive equipment while standing.       Start:  05/30/24    Expected End:  06/13/24            Patient will complete toileting including hygiene clothing management/hygiene with modified independent level of assistance and raised toilet seat and grab bars.       Start:  05/30/24    Expected End:  06/13/24               MOBILITY       Patient will perform Functional mobility x Household distances/Community Distances with modified independent level of assistance and least restrictive device in order to improve safety and functional mobility.       Start:  05/30/24    Expected End:  06/13/24               TRANSFERS       Patient will perform bed mobility modified independent level of assistance in order to improve safety and independence with mobility       Start:  05/30/24    Expected End:  06/13/24            Patient will complete functional transfers with least restrictive device with modified independent level of assistance.       Start:  05/30/24     Expected End:  06/13/24

## 2024-05-30 NOTE — H&P
History Of Present Illness  Svitlana Perry is a 74 y.o. female with a past medical history of alcoholic cirrhosis, hyperlipidemia, hypertension, PAD, RA, nicotine dependence, pancreatitis, ascites who presPatient will be admitted to stepdown for further workup.ents to [facility] due to dizziness.  Patient was recently discharged 2 days ago was admitted for 4 days due to generalized weakness, inability to ambulate, failure to thrive, hypoxia, tachycardia COPD masturbation as well as elevated troponin elevated liver enzymes and electrolyte imbalance.  Patient was discharged home with home care.     Patient states that yesterday evening she was at home and began feeling dizzy and passed out.  States that she came to shortly thereafter and called EMS and they helped her onto the couch.  States that she felt improved and this morning was feeling extremely dizzy again so presented to the ED.  Denies any headache, vision changes, neck pain, back pain, chest pain, shortness breath, palpitations, abdominal pain, diarrhea, dysuria.  Patient believes she likely hit her head when she fell.  Denies any alcohol use since prior to recent hospitalization, reports last drink was 2+ weeks ago.  Denies any tremulousness.  Patient is heart rate was elevated in the 160s-170s upon arrival.  Patient EKG showed A-fib RVR.  Patient was cardioverted, started on a heparin drip as well as a amiodarone drip.  Patient's blood pressure was noted to be soft in the emergency department, ICU evaluated patient and deemed her okay for her to stepdown unit.  Patient urine also noted to have +4 bacteria as well as leukocyte Estrace.  Patient blood glucose was 126, sodium 134, potassium 3.3 replaced with 40 mEq, alk phos 269, ALT 35, AST 40, total bilirubin 2, magnesium 1.1, high-sensitivity troponin 263 with a repeat of 209, WBC 13.2, hemoglobin 9.8, hematocrit 28.2, platelets 192.  Patient currently denies headache, dizziness, shortness  of breath, chest pain, nausea vomiting or diarrhea.  CT head showing atrophy and chronic ischemic white matter demyelination without acute intracranial finding, no evidence of intracranial hemorrhage or displaced skull fracture.  CT cervical spine without IV contrast showed degenerative changes without acute fracture or subluxation.  Chest x-ray was negative.  X-ray of pelvis was negative, however did show moderate amount of stool in the colon.  Patient will be admitted to stepdown for further workup.     Past Medical History  She has no past medical history on file.    Surgical History  She has a past surgical history that includes Other surgical history (02/15/2016); MR angio neck wo IV contrast (08/19/2019); MR angio head wo IV contrast (08/19/2019); MR angio neck wo IV contrast (11/30/2022); and Appendectomy.     Social History  She reports that she has been smoking cigarettes. She has never used smokeless tobacco. She reports that she does not currently use alcohol. She reports that she does not use drugs.    Family History  No family history on file.     Allergies  Patient has no known allergies.    Review of Systems   Constitutional:  Positive for fatigue.   Respiratory: Negative.     Cardiovascular: Negative.    Gastrointestinal: Negative.    Endocrine: Negative.    Genitourinary: Negative.    Musculoskeletal: Negative.    Skin: Negative.    Neurological: Negative.    Psychiatric/Behavioral: Negative.          Physical Exam  HENT:      Head: Normocephalic.      Mouth/Throat:      Mouth: Mucous membranes are moist.   Cardiovascular:      Rate and Rhythm: Normal rate and regular rhythm.   Pulmonary:      Effort: Pulmonary effort is normal.      Breath sounds: Normal breath sounds.   Abdominal:      General: Bowel sounds are normal.   Musculoskeletal:         General: Normal range of motion.      Cervical back: Neck supple.   Skin:     General: Skin is warm.   Neurological:      Mental Status: She is alert and  oriented to person, place, and time.   Psychiatric:         Mood and Affect: Mood normal.         Behavior: Behavior normal.          Last Recorded Vitals  /59 (BP Location: Right arm, Patient Position: Lying)   Pulse 93   Temp 36.8 °C (98.2 °F) (Oral)   Resp 18   Wt 56.7 kg (125 lb)   SpO2 97%     Relevant Results      Results for orders placed or performed during the hospital encounter of 05/29/24 (from the past 24 hour(s))   CBC and Auto Differential   Result Value Ref Range    WBC 13.2 (H) 4.4 - 11.3 x10*3/uL    nRBC 0.0 0.0 - 0.0 /100 WBCs    RBC 2.73 (L) 4.00 - 5.20 x10*6/uL    Hemoglobin 9.8 (L) 12.0 - 16.0 g/dL    Hematocrit 28.2 (L) 36.0 - 46.0 %     (H) 80 - 100 fL    MCH 35.9 (H) 26.0 - 34.0 pg    MCHC 34.8 32.0 - 36.0 g/dL    RDW 15.3 (H) 11.5 - 14.5 %    Platelets 192 150 - 450 x10*3/uL    Neutrophils % 77.8 40.0 - 80.0 %    Immature Granulocytes %, Automated 1.2 (H) 0.0 - 0.9 %    Lymphocytes % 11.0 13.0 - 44.0 %    Monocytes % 9.1 2.0 - 10.0 %    Eosinophils % 0.4 0.0 - 6.0 %    Basophils % 0.5 0.0 - 2.0 %    Neutrophils Absolute 10.32 (H) 1.60 - 5.50 x10*3/uL    Immature Granulocytes Absolute, Automated 0.16 0.00 - 0.50 x10*3/uL    Lymphocytes Absolute 1.45 0.80 - 3.00 x10*3/uL    Monocytes Absolute 1.20 (H) 0.05 - 0.80 x10*3/uL    Eosinophils Absolute 0.05 0.00 - 0.40 x10*3/uL    Basophils Absolute 0.06 0.00 - 0.10 x10*3/uL   Magnesium   Result Value Ref Range    Magnesium 1.10 (L) 1.60 - 2.40 mg/dL   Comprehensive metabolic panel   Result Value Ref Range    Glucose 126 (H) 74 - 99 mg/dL    Sodium 134 (L) 136 - 145 mmol/L    Potassium 3.3 (L) 3.5 - 5.3 mmol/L    Chloride 97 (L) 98 - 107 mmol/L    Bicarbonate 27 21 - 32 mmol/L    Anion Gap 13 10 - 20 mmol/L    Urea Nitrogen 21 6 - 23 mg/dL    Creatinine 0.79 0.50 - 1.05 mg/dL    eGFR 80 >60 mL/min/1.73m*2    Calcium 8.5 (L) 8.6 - 10.3 mg/dL    Albumin 3.4 3.4 - 5.0 g/dL    Alkaline Phosphatase 269 (H) 33 - 136 U/L    Total Protein  6.0 (L) 6.4 - 8.2 g/dL    AST 40 (H) 9 - 39 U/L    Bilirubin, Total 2.0 (H) 0.0 - 1.2 mg/dL    ALT 35 7 - 45 U/L   Protime-INR   Result Value Ref Range    Protime 13.3 (H) 9.8 - 12.8 seconds    INR 1.2 (H) 0.9 - 1.1   Troponin I, High Sensitivity, Initial   Result Value Ref Range    Troponin I, High Sensitivity 263 (HH) 0 - 13 ng/L   ECG 12 lead   Result Value Ref Range    Ventricular Rate 167 BPM    QRS Duration 64 ms    QT Interval 246 ms    QTC Calculation(Bazett) 410 ms    R Axis 71 degrees    T Axis 250 degrees    QRS Count 28 beats    Q Onset 225 ms    T Offset 348 ms    QTC Fredericia 346 ms   Urinalysis with Reflex Culture and Microscopic   Result Value Ref Range    Color, Urine Yellow Light-Yellow, Yellow, Dark-Yellow    Appearance, Urine Turbid (N) Clear    Specific Gravity, Urine 1.015 1.005 - 1.035    pH, Urine 6.5 5.0, 5.5, 6.0, 6.5, 7.0, 7.5, 8.0    Protein, Urine 10 (TRACE) NEGATIVE, 10 (TRACE), 20 (TRACE) mg/dL    Glucose, Urine Normal Normal mg/dL    Blood, Urine NEGATIVE NEGATIVE    Ketones, Urine NEGATIVE NEGATIVE mg/dL    Bilirubin, Urine NEGATIVE NEGATIVE    Urobilinogen, Urine 8 (3+) (A) Normal mg/dL    Nitrite, Urine NEGATIVE NEGATIVE    Leukocyte Esterase, Urine 75 [place]/µL (A) NEGATIVE   Microscopic Only, Urine   Result Value Ref Range    WBC, Urine 6-10 (A) 1-5, NONE /HPF    RBC, Urine 1-2 NONE, 1-2, 3-5 /HPF    Squamous Epithelial Cells, Urine 1-9 (SPARSE) Reference range not established. /HPF    Bacteria, Urine 4+ (A) NONE SEEN /HPF    Mucus, Urine FEW Reference range not established. /LPF   Troponin, High Sensitivity, 1 Hour   Result Value Ref Range    Troponin I, High Sensitivity 209 (HH) 0 - 13 ng/L   BLOOD GAS VENOUS FULL PANEL   Result Value Ref Range    POCT pH, Venous 7.39 7.33 - 7.43 pH    POCT pCO2, Venous 44 41 - 51 mm Hg    POCT pO2, Venous 21 (L) 35 - 45 mm Hg    POCT SO2, Venous 21 (L) 45 - 75 %    POCT Oxy Hemoglobin, Venous 20.5 (L) 45.0 - 75.0 %    POCT Hematocrit  Calculated, Venous 26.0 (L) 36.0 - 46.0 %    POCT Sodium, Venous 132 (L) 136 - 145 mmol/L    POCT Potassium, Venous 3.2 (L) 3.5 - 5.3 mmol/L    POCT Chloride, Venous 100 98 - 107 mmol/L    POCT Ionized Calicum, Venous 1.20 1.10 - 1.33 mmol/L    POCT Glucose, Venous 122 (H) 74 - 99 mg/dL    POCT Lactate, Venous 2.0 0.4 - 2.0 mmol/L    POCT Base Excess, Venous 1.4 -2.0 - 3.0 mmol/L    POCT HCO3 Calculated, Venous 26.6 (H) 22.0 - 26.0 mmol/L    POCT Hemoglobin, Venous 8.8 (L) 12.0 - 16.0 g/dL    POCT Anion Gap, Venous 9.0 (L) 10.0 - 25.0 mmol/L    Patient Temperature 37.0 degrees Celsius    FiO2 21 %   Ethanol   Result Value Ref Range    Alcohol <10 <=10 mg/dL   ECG 12 lead   Result Value Ref Range    Ventricular Rate 106 BPM    Atrial Rate 106 BPM    IN Interval 140 ms    QRS Duration 68 ms    QT Interval 306 ms    QTC Calculation(Bazett) 406 ms    P Axis 82 degrees    R Axis 86 degrees    T Axis 92 degrees    QRS Count 18 beats    Q Onset 225 ms    P Onset 155 ms    P Offset 209 ms    T Offset 378 ms    QTC Fredericia 369 ms   Heparin Assay, UFH   Result Value Ref Range    Heparin Unfractionated 1.1 (HH) See Comment Below for Therapeutic Ranges IU/mL          Assessment/Plan   Principal Problem:    Atrial fibrillation with RVR (Multi)  Urinary tract infection  Plan:  Patient was cardioverted  Cardiology following  Telemetry  Patient will amiodarone drip per cardiology  Heparin drip  Patient to be admitted to stepdown unit  Monitor closely  Rocephin daily follow urine culture    Alcoholism  -continue to monitor for withdrawal though is likely out of the window      HypoK  -Replaced in emergency department, monitor and replace as needed     HypoMg  -Replaced in emergency department, monitor and replace as needed  DVT prophylaxis-patient on heparin drip       Mike Marques, APRN-CNP

## 2024-05-30 NOTE — PROGRESS NOTES
"Svitlana Perry is a 74 y.o. female on day 0 of admission presenting with Atrial fibrillation with RVR (Multi).    Subjective   Requested evaluation of this 74 year old female who had been boarding in the ED since early this AM after she returned to the hospital after a recent discharge with c/o weakness, altered lab values and emesis. Treated for A fib RVR now on Amiodarone at 0.5 mg /min Noted recent hospitalization 5/23/ to 5/27 /2024 for similar complaints. She has in addition to the A Fib RVR MMP including : PVD, chronic alcohol use,  hepatitis B, hypertension, hyperlipidemia, rheumatoid arthritis, leg edema, neuropathy, COPD pancreatitis, weakness and debility. Per records she continues to consume alcohol and smokes 1 PPD cigarettes.          Objective     Physical Exam  Awake alert conversant /61 denies symptoms.NO dyspnea, no dizziness or lightheadedness sitting upright on gurney. Symmetric chest expansion ABD bland depressible   Agree with SDU discussed with ED MD Potassium magnesium to be repleted K > 4.0, Mag > 2.4   Last Recorded Vitals  Blood pressure 104/59, pulse 93, temperature 36.8 °C (98.2 °F), temperature source Oral, resp. rate 18, height 1.651 m (5' 5\"), weight 56.7 kg (125 lb), SpO2 97%.  Intake/Output last 3 Shifts:  not recorded     LABS:  CMP:  Results from last 7 days   Lab Units 05/29/24  1250 05/26/24  0640 05/25/24  0550 05/24/24  0752 05/23/24  1252   SODIUM mmol/L 134* 137 134* 138 132*   POTASSIUM mmol/L 3.3* 3.2* 3.2* 2.6* 3.3*   CHLORIDE mmol/L 97* 101 99 98 94*   CO2 mmol/L 27 29 26 27 18*   ANION GAP mmol/L 13 10 12 16 23*   BUN mg/dL 21 13 13 12 16   CREATININE mg/dL 0.79 0.43* 0.46* 0.52 0.69   EGFR mL/min/1.73m*2 80 >90 >90 >90 >90   MAGNESIUM mg/dL 1.10*  --  1.60 1.40* 1.30*   ALBUMIN g/dL 3.4 3.0* 3.2* 3.2* 3.8   ALT U/L 35 38 35 35 42   AST U/L 40* 74* 82* 82* 106*   BILIRUBIN TOTAL mg/dL 2.0* 1.6* 2.3* 3.5* 4.8*     CBC:  Results from last 7 days   Lab Units " "05/29/24  1250 05/26/24  0640 05/25/24  0550 05/24/24  0752 05/23/24  1252   WBC AUTO x10*3/uL 13.2* 6.9 7.3 5.1 4.5   HEMOGLOBIN g/dL 9.8* 9.9* 9.9* 10.5* 12.2   HEMATOCRIT % 28.2* 27.2* 26.4* 28.0* 33.1*   PLATELETS AUTO x10*3/uL 192 92* 83* 62* 59*   MCV fL 103* 99 97 95 98     COAG:   Results from last 7 days   Lab Units 05/29/24  1250 05/24/24  1421 05/23/24  1252   INR  1.2* 1.0 1.0     ABO: No results found for: \"ABO\"  HEME/ENDO:  Results from last 7 days   Lab Units 05/24/24  0752   FERRITIN ng/mL 3,541*   TSH mIU/L 3.38   HEMOGLOBIN A1C % 4.1      CARDIAC:   Results from last 7 days   Lab Units 05/29/24  1344 05/29/24  1250 05/23/24  2043 05/23/24  1252   TROPHS ng/L 209* 263* 12 15*   BNP pg/mL  --   --   --  120*           This critically ill patient continues to be at-risk for clinically significant deterioration / failure due to the above mentioned dysfunctional, unstable organ systems.  I have personally identified and managed all complex critical care issues to prevent aforementioned clinical deterioration.  Critical care time is spent at bedside and/or the immediate area and has included, but is not limited to, the review of diagnostic tests, labs, radiographs, serial assessments of hemodynamics, respiratory status, ventilatory management, and family updates.  Time spent in procedures and teaching are reported separately.   CRITICAL CARE TIME: 30  minutes                 Assessment/Plan   Principal Problem:    Atrial fibrillation with RVR (Multi)  Hypomagnesemia  hypokalemia      Jacob Garcia MD      "

## 2024-05-31 ENCOUNTER — TELEMEDICINE (OUTPATIENT)
Dept: PHARMACY | Facility: HOSPITAL | Age: 74
End: 2024-05-31
Payer: COMMERCIAL

## 2024-05-31 ENCOUNTER — APPOINTMENT (OUTPATIENT)
Dept: CARDIOLOGY | Facility: HOSPITAL | Age: 74
DRG: 309 | End: 2024-05-31
Payer: COMMERCIAL

## 2024-05-31 DIAGNOSIS — J44.9 CHRONIC OBSTRUCTIVE PULMONARY DISEASE, UNSPECIFIED COPD TYPE (MULTI): Primary | ICD-10-CM

## 2024-05-31 DIAGNOSIS — J45.909 ASTHMA, UNSPECIFIED ASTHMA SEVERITY, UNSPECIFIED WHETHER COMPLICATED, UNSPECIFIED WHETHER PERSISTENT (HHS-HCC): ICD-10-CM

## 2024-05-31 LAB
ALBUMIN SERPL BCP-MCNC: 2.8 G/DL (ref 3.4–5)
ANION GAP SERPL CALC-SCNC: 11 MMOL/L (ref 10–20)
AORTIC VALVE MEAN GRADIENT: 6.5 MMHG
AORTIC VALVE PEAK VELOCITY: 1.7 M/S
AV PEAK GRADIENT: 11.5 MMHG
AVA (PEAK VEL): 1.55 CM2
AVA (VTI): 1.62 CM2
BUN SERPL-MCNC: 8 MG/DL (ref 6–23)
CALCIUM SERPL-MCNC: 8.1 MG/DL (ref 8.6–10.3)
CHLORIDE SERPL-SCNC: 101 MMOL/L (ref 98–107)
CO2 SERPL-SCNC: 25 MMOL/L (ref 21–32)
CREAT SERPL-MCNC: 0.61 MG/DL (ref 0.5–1.05)
EGFRCR SERPLBLD CKD-EPI 2021: >90 ML/MIN/1.73M*2
EJECTION FRACTION APICAL 4 CHAMBER: 66.5
ERYTHROCYTE [DISTWIDTH] IN BLOOD BY AUTOMATED COUNT: 15 % (ref 11.5–14.5)
GLOBAL LONGITUDINAL STRAIN: 17 %
GLUCOSE SERPL-MCNC: 99 MG/DL (ref 74–99)
HCT VFR BLD AUTO: 23.8 % (ref 36–46)
HGB BLD-MCNC: 8.3 G/DL (ref 12–16)
LEFT ATRIUM VOLUME AREA LENGTH INDEX BSA: 33 ML/M2
LEFT VENTRICLE INTERNAL DIMENSION DIASTOLE: 4.48 CM (ref 3.5–6)
LEFT VENTRICULAR OUTFLOW TRACT DIAMETER: 1.91 CM
LV EJECTION FRACTION BIPLANE: 68 %
MAGNESIUM SERPL-MCNC: 1.3 MG/DL (ref 1.6–2.4)
MCH RBC QN AUTO: 35.6 PG (ref 26–34)
MCHC RBC AUTO-ENTMCNC: 34.9 G/DL (ref 32–36)
MCV RBC AUTO: 102 FL (ref 80–100)
MITRAL VALVE E/A RATIO: 1.12
MITRAL VALVE E/E' RATIO: 11.27
NRBC BLD-RTO: 0 /100 WBCS (ref 0–0)
PHOSPHATE SERPL-MCNC: 3 MG/DL (ref 2.5–4.9)
PLATELET # BLD AUTO: 228 X10*3/UL (ref 150–450)
POTASSIUM SERPL-SCNC: 3.2 MMOL/L (ref 3.5–5.3)
RBC # BLD AUTO: 2.33 X10*6/UL (ref 4–5.2)
RIGHT VENTRICLE FREE WALL PEAK S': 16 CM/S
RIGHT VENTRICLE PEAK SYSTOLIC PRESSURE: 46.3 MMHG
SODIUM SERPL-SCNC: 134 MMOL/L (ref 136–145)
TRICUSPID ANNULAR PLANE SYSTOLIC EXCURSION: 2 CM
WBC # BLD AUTO: 8.5 X10*3/UL (ref 4.4–11.3)

## 2024-05-31 PROCEDURE — 2500000001 HC RX 250 WO HCPCS SELF ADMINISTERED DRUGS (ALT 637 FOR MEDICARE OP): Performed by: NURSE PRACTITIONER

## 2024-05-31 PROCEDURE — 85027 COMPLETE CBC AUTOMATED: CPT | Performed by: STUDENT IN AN ORGANIZED HEALTH CARE EDUCATION/TRAINING PROGRAM

## 2024-05-31 PROCEDURE — 2500000004 HC RX 250 GENERAL PHARMACY W/ HCPCS (ALT 636 FOR OP/ED): Performed by: STUDENT IN AN ORGANIZED HEALTH CARE EDUCATION/TRAINING PROGRAM

## 2024-05-31 PROCEDURE — 2500000004 HC RX 250 GENERAL PHARMACY W/ HCPCS (ALT 636 FOR OP/ED): Performed by: NURSE PRACTITIONER

## 2024-05-31 PROCEDURE — 93306 TTE W/DOPPLER COMPLETE: CPT | Performed by: INTERNAL MEDICINE

## 2024-05-31 PROCEDURE — 1200000002 HC GENERAL ROOM WITH TELEMETRY DAILY

## 2024-05-31 PROCEDURE — 36415 COLL VENOUS BLD VENIPUNCTURE: CPT | Performed by: STUDENT IN AN ORGANIZED HEALTH CARE EDUCATION/TRAINING PROGRAM

## 2024-05-31 PROCEDURE — 83735 ASSAY OF MAGNESIUM: CPT | Performed by: STUDENT IN AN ORGANIZED HEALTH CARE EDUCATION/TRAINING PROGRAM

## 2024-05-31 PROCEDURE — 2500000001 HC RX 250 WO HCPCS SELF ADMINISTERED DRUGS (ALT 637 FOR MEDICARE OP)

## 2024-05-31 PROCEDURE — 2500000001 HC RX 250 WO HCPCS SELF ADMINISTERED DRUGS (ALT 637 FOR MEDICARE OP): Performed by: STUDENT IN AN ORGANIZED HEALTH CARE EDUCATION/TRAINING PROGRAM

## 2024-05-31 PROCEDURE — 93306 TTE W/DOPPLER COMPLETE: CPT

## 2024-05-31 PROCEDURE — 80069 RENAL FUNCTION PANEL: CPT | Performed by: STUDENT IN AN ORGANIZED HEALTH CARE EDUCATION/TRAINING PROGRAM

## 2024-05-31 PROCEDURE — 94640 AIRWAY INHALATION TREATMENT: CPT

## 2024-05-31 PROCEDURE — G0378 HOSPITAL OBSERVATION PER HR: HCPCS

## 2024-05-31 PROCEDURE — 2500000002 HC RX 250 W HCPCS SELF ADMINISTERED DRUGS (ALT 637 FOR MEDICARE OP, ALT 636 FOR OP/ED)

## 2024-05-31 PROCEDURE — 99232 SBSQ HOSP IP/OBS MODERATE 35: CPT | Performed by: INTERNAL MEDICINE

## 2024-05-31 PROCEDURE — 99232 SBSQ HOSP IP/OBS MODERATE 35: CPT | Performed by: STUDENT IN AN ORGANIZED HEALTH CARE EDUCATION/TRAINING PROGRAM

## 2024-05-31 RX ORDER — POTASSIUM CHLORIDE 1.5 G/1.58G
40 POWDER, FOR SOLUTION ORAL 2 TIMES DAILY
Status: COMPLETED | OUTPATIENT
Start: 2024-05-31 | End: 2024-05-31

## 2024-05-31 RX ORDER — MAGNESIUM SULFATE HEPTAHYDRATE 40 MG/ML
4 INJECTION, SOLUTION INTRAVENOUS ONCE
Status: COMPLETED | OUTPATIENT
Start: 2024-05-31 | End: 2024-05-31

## 2024-05-31 RX ORDER — LIDOCAINE 40 MG/G
CREAM TOPICAL 2 TIMES DAILY PRN
Status: DISCONTINUED | OUTPATIENT
Start: 2024-05-31 | End: 2024-06-01 | Stop reason: HOSPADM

## 2024-05-31 RX ORDER — PANTOPRAZOLE SODIUM 40 MG/1
40 TABLET, DELAYED RELEASE ORAL
Status: DISCONTINUED | OUTPATIENT
Start: 2024-05-31 | End: 2024-06-01 | Stop reason: HOSPADM

## 2024-05-31 RX ADMIN — APIXABAN 5 MG: 5 TABLET, FILM COATED ORAL at 08:17

## 2024-05-31 RX ADMIN — POTASSIUM CHLORIDE 40 MEQ: 1.5 POWDER, FOR SOLUTION ORAL at 21:56

## 2024-05-31 RX ADMIN — PANTOPRAZOLE SODIUM 40 MG: 40 TABLET, DELAYED RELEASE ORAL at 09:11

## 2024-05-31 RX ADMIN — TIOTROPIUM BROMIDE INHALATION SPRAY 2 PUFF: 3.12 SPRAY, METERED RESPIRATORY (INHALATION) at 07:18

## 2024-05-31 RX ADMIN — FOLIC ACID 1 MG: 1 TABLET ORAL at 08:17

## 2024-05-31 RX ADMIN — FLUTICASONE FUROATE AND VILANTEROL TRIFENATATE 1 PUFF: 100; 25 POWDER RESPIRATORY (INHALATION) at 07:19

## 2024-05-31 RX ADMIN — POTASSIUM CHLORIDE 40 MEQ: 1.5 POWDER, FOR SOLUTION ORAL at 15:11

## 2024-05-31 RX ADMIN — AMIODARONE HYDROCHLORIDE 400 MG: 200 TABLET ORAL at 08:17

## 2024-05-31 RX ADMIN — LIDOCAINE: 40 CREAM TOPICAL at 15:11

## 2024-05-31 RX ADMIN — MULTIPLE VITAMINS W/ MINERALS TAB 1 TABLET: TAB at 08:17

## 2024-05-31 RX ADMIN — AMIODARONE HYDROCHLORIDE 400 MG: 200 TABLET ORAL at 21:55

## 2024-05-31 RX ADMIN — MAGNESIUM SULFATE HEPTAHYDRATE 4 G: 40 INJECTION, SOLUTION INTRAVENOUS at 15:11

## 2024-05-31 RX ADMIN — THIAMINE HYDROCHLORIDE 100 MG: 100 INJECTION, SOLUTION INTRAMUSCULAR; INTRAVENOUS at 09:11

## 2024-05-31 RX ADMIN — APIXABAN 5 MG: 5 TABLET, FILM COATED ORAL at 21:56

## 2024-05-31 RX ADMIN — CEFTRIAXONE SODIUM 1 G: 1 INJECTION, SOLUTION INTRAVENOUS at 16:18

## 2024-05-31 ASSESSMENT — COGNITIVE AND FUNCTIONAL STATUS - GENERAL
DRESSING REGULAR UPPER BODY CLOTHING: A LITTLE
CLIMB 3 TO 5 STEPS WITH RAILING: A LOT
CLIMB 3 TO 5 STEPS WITH RAILING: A LOT
MOVING FROM LYING ON BACK TO SITTING ON SIDE OF FLAT BED WITH BEDRAILS: A LITTLE
HELP NEEDED FOR BATHING: A LITTLE
DRESSING REGULAR LOWER BODY CLOTHING: A LITTLE
TOILETING: A LOT
DRESSING REGULAR LOWER BODY CLOTHING: A LITTLE
DAILY ACTIVITIY SCORE: 21
TURNING FROM BACK TO SIDE WHILE IN FLAT BAD: A LOT
WALKING IN HOSPITAL ROOM: A LOT
MOVING TO AND FROM BED TO CHAIR: A LITTLE
EATING MEALS: A LITTLE
MOBILITY SCORE: 19
PERSONAL GROOMING: A LITTLE
STANDING UP FROM CHAIR USING ARMS: A LOT
MOBILITY SCORE: 13
TURNING FROM BACK TO SIDE WHILE IN FLAT BAD: A LITTLE
WALKING IN HOSPITAL ROOM: A LITTLE
DAILY ACTIVITIY SCORE: 17
MOVING TO AND FROM BED TO CHAIR: A LOT
TOILETING: A LITTLE
HELP NEEDED FOR BATHING: A LITTLE

## 2024-05-31 ASSESSMENT — LIFESTYLE VARIABLES
ORIENTATION AND CLOUDING OF SENSORIUM: ORIENTED AND CAN DO SERIAL ADDITIONS
VISUAL DISTURBANCES: NOT PRESENT
TREMOR: NO TREMOR
VISUAL DISTURBANCES: NOT PRESENT
NAUSEA AND VOMITING: NO NAUSEA AND NO VOMITING
ORIENTATION AND CLOUDING OF SENSORIUM: ORIENTED AND CAN DO SERIAL ADDITIONS
PAROXYSMAL SWEATS: NO SWEAT VISIBLE
AGITATION: NORMAL ACTIVITY
AGITATION: NORMAL ACTIVITY
TREMOR: NO TREMOR
ANXIETY: NO ANXIETY, AT EASE
TOTAL SCORE: 0
HEADACHE, FULLNESS IN HEAD: NOT PRESENT
PAROXYSMAL SWEATS: NO SWEAT VISIBLE
NAUSEA AND VOMITING: NO NAUSEA AND NO VOMITING
ANXIETY: NO ANXIETY, AT EASE
AUDITORY DISTURBANCES: NOT PRESENT
TOTAL SCORE: 0
HEADACHE, FULLNESS IN HEAD: NOT PRESENT
AUDITORY DISTURBANCES: NOT PRESENT

## 2024-05-31 ASSESSMENT — PAIN DESCRIPTION - DESCRIPTORS: DESCRIPTORS: SORE

## 2024-05-31 ASSESSMENT — PAIN SCALES - GENERAL
PAINLEVEL_OUTOF10: 0 - NO PAIN
PAINLEVEL_OUTOF10: 0 - NO PAIN

## 2024-05-31 ASSESSMENT — PAIN - FUNCTIONAL ASSESSMENT
PAIN_FUNCTIONAL_ASSESSMENT: 0-10
PAIN_FUNCTIONAL_ASSESSMENT: 0-10

## 2024-05-31 ASSESSMENT — ENCOUNTER SYMPTOMS
ABDOMINAL DISTENTION: 0
ABDOMINAL PAIN: 0
FEVER: 0
SHORTNESS OF BREATH: 0
VOMITING: 0

## 2024-05-31 NOTE — CARE PLAN
"The patient's goals for the shift include \"Get some sleep\"    The clinical goals for the shift include pt safety will be maintained in duration of the shift    Over the shift, the patient did make progress toward the following goals.-    "

## 2024-05-31 NOTE — PROGRESS NOTES
05/31/24 3670   Discharge Planning   Assistance Needed ETOH abuse recovery resources     Met with patient at bedside. Patient lives alone, but has close relationship with neighbor who looks out for her and sees her daily. Patient stated that she has not had drink since previous admission (5/23/24). Patient states that she was previously sober for 20 years, but started drinking again after her mom passed away and she retired. Patient stated that she was bored and drank to cope. Patient has long history of sobriety and is not interested in resources or treatment at this time. SW will follow for any additional social  needs.

## 2024-05-31 NOTE — PROGRESS NOTES
Svitlana Perry is a 74 y.o. female who was referred to the Clinical Pharmacy Team to complete a virtual Transitions of Care encounter for discharge medication optimization. The patient was referred for their COPD/asthma.    Attending: Dr. Garfield Gibbons    PCP: Dr. Demraco Gonzalez    _______________________________________________________________________  PHARMACY ASSESSMENT    Home Pharmacy: [facility]  Meds to beds? yes    Affordability/Accessibility: Trelegy is ~$32 per month  Adherence/Organization: Has not started Trelegy which was prescribed recently  Adverse Effects: no issues    Patient reports she would like a less expensive inhaler. She states Breo and Spiriva seem to be working well in the hospital for her. When asked about income, patient reports she lives alone and income is less than 400% of the [employer].   _______________________________________________________________________  ASTHMA/COPD ASSESSMENT    CURRENT PHARMACOTHERAPY  - Trelegy (not taking)  - Duonebs 3 times daily (ok with using consistently but does not enjoy cleaning mask/tubing all the time)    HISTORICAL PHARMACOTHERAPY  - Fluticasone/salmeterol    INHALER TECHNIQUE  - not assessed    SECONDARY PREVENTION  - Influenza: recommended yearly, not currently flu season  - Pneumococcal: Pneumovax 23 in 2016  - RSV: no history  - COVID: last fall 2022    EXACERBATION HISTORY  - When was your last hospitalization for an exacerbation? 5/23/24  - When was the last time you were treated with antibiotics and/or steroids? current  ___________________________________________________________________  PATIENT EDUCATION/GOALS  - Discussed role of clinical pharmacy team in hospital discharge follow up. Discussed how they can help with the cost of Trelegy  - Emphasized importance of Trelegy use and follow up with Dr. Gonzalez  - Answered all patient questions and concerns to best of my  ability  _______________________________________________________________________  RECOMMENDATIONS/PLAN  START Trelegy 100-62.5-25 mcg 1 puff daily  Continue all medications per medical team  Please send prescriptions to [facility] pharmacy for assistance on insurance prior authorization and copay. Prescriptions will be delivered to the patient's bedside prior to discharge with the Meds to Beds program.   Continuity of care will be provided by PCP and clinical pharmacy team  Clinical pharmacy team will evaluate patient for [facility] program for Trelegy, and likely Eliquis newly prescribed this hospitalization      Arminda Allen, Aura    Verbal consent to manage patient's drug therapy was obtained from the patient. They were informed they may decline to participate or withdraw from participation in pharmacy services at any time.

## 2024-05-31 NOTE — NURSING NOTE
Patient transferred to room 630 at 1830. Patient oriented to room. Call button in place. Patient denies any pain or discomfort. This nurse contacted nutrition host to bring dinner tray, as patient has not eaten anything. Patient denies any other needs at this time.

## 2024-05-31 NOTE — CARE PLAN
"  Problem: Fall/Injury  Goal: Not fall by end of shift  5/30/2024 2137 by Fifi Aguero RN  Outcome: Progressing  5/30/2024 2018 by Fifi Aguero RN  Outcome: Progressing  Goal: Be free from injury by end of the shift  5/30/2024 2137 by Fifi Aguero RN  Outcome: Progressing  5/30/2024 2018 by Fifi Aguero RN  Outcome: Progressing  Goal: Verbalize understanding of personal risk factors for fall in the hospital  5/30/2024 2137 by Fifi Aguero RN  Outcome: Progressing  5/30/2024 2018 by Fifi Aguero RN  Outcome: Progressing  Goal: Verbalize understanding of risk factor reduction measures to prevent injury from fall in the home  5/30/2024 2137 by Fifi Aguero RN  Outcome: Progressing  5/30/2024 2018 by Fifi Aguero RN  Outcome: Progressing  Goal: Use assistive devices by end of the shift  5/30/2024 2137 by Fifi Aguero RN  Outcome: Progressing  5/30/2024 2018 by Fifi Aguero RN  Outcome: Progressing  Goal: Pace activities to prevent fatigue by end of the shift  5/30/2024 2137 by Fifi Aguero RN  Outcome: Progressing  5/30/2024 2018 by Fifi Aguero RN  Outcome: Progressing   The patient's goals for the shift include \"Get some sleep\"    The clinical goals for the shift include pt safety will be maintained in duration of the shift    Over the shift, the patient did make progress toward the following goals.     "

## 2024-05-31 NOTE — PROGRESS NOTES
05/31/24 1039   Discharge Planning   Patient expects to be discharged to: Home with [facility]         Patient was recently discharged home from the hospital with [facility]. She was weak at home and called EMS. She was in A fib RVR, heart rate in 160's. Patient was cardioverted in ED. She was on amio drip and was transitioned to oral amio. Same with Heparin drip was switched to oral, Eliquis. When med ready patient will go home with [facility]. Requested from Dr. Gibbons to place referral for [facility].

## 2024-05-31 NOTE — PROGRESS NOTES
"Subjective Data:  Patient continues to deny any c/o CP or dyspnea.   Telemetry reveals SR  She c/o body aches today    Overnight Events:    none     Objective Data:  Last Recorded Vitals:  Vitals:    24 0011 24 0418 24 0718 24 0804   BP: 102/54 101/61  119/54   BP Location: Right arm Right arm  Right arm   Patient Position: Lying Lying  Lying   Pulse: 94   89   Resp: 16 17  18   Temp: 36.7 °C (98.1 °F) 37.1 °C (98.7 °F)  37.4 °C (99.3 °F)   TempSrc: Temporal Skin  Temporal   SpO2: 95% 96% 97% 96%   Weight:    59.9 kg (132 lb)   Height:    1.651 m (5' 5\")     Medical Gas Therapy: None (Room air)  O2 Delivery Method: Nasal cannula  Weight  Av.9 kg (129 lb 12.8 oz)  Min: 56.7 kg (125 lb)  Max: 60.1 kg (132 lb 6.4 oz)    LABS:  CMP:  Results from last 7 days   Lab Units 24  0509 24  1250 24  0640 24  0550   SODIUM mmol/L 135* 134* 137 134*   POTASSIUM mmol/L 3.6 3.3* 3.2* 3.2*   CHLORIDE mmol/L 104 97* 101 99   CO2 mmol/L 24 27 29 26   ANION GAP mmol/L 11 13 10 12   BUN mg/dL 14 21 13 13   CREATININE mg/dL 0.56 0.79 0.43* 0.46*   EGFR mL/min/1.73m*2 >90 80 >90 >90   MAGNESIUM mg/dL 1.70 1.10*  --  1.60   ALBUMIN g/dL  --  3.4 3.0* 3.2*   ALT U/L  --  35 38 35   AST U/L  --  40* 74* 82*   BILIRUBIN TOTAL mg/dL  --  2.0* 1.6* 2.3*     CBC:  Results from last 7 days   Lab Units 24  0509 24  1250 24  0640 24  0550   WBC AUTO x10*3/uL 10.1 13.2* 6.9 7.3   HEMOGLOBIN g/dL 7.3* 9.8* 9.9* 9.9*   HEMATOCRIT % 21.5* 28.2* 27.2* 26.4*   PLATELETS AUTO x10*3/uL 172 192 92* 83*   MCV fL 102* 103* 99 97     COAG:   Results from last 7 days   Lab Units 24  1250 24  1421   INR  1.2* 1.0     ABO: No results found for: \"ABO\"  HEME/ENDO:     CARDIAC:   Results from last 7 days   Lab Units 24  1344 24  1250   TROPHS ng/L 209* 263*             Last I/O:    Intake/Output Summary (Last 24 hours) at 2024 1137  Last data filed at 2024 " 1712  Gross per 24 hour   Intake 898.4 ml   Output --   Net 898.4 ml     Net IO Since Admission: 1,412.09 mL [05/31/24 1137]         Inpatient Medications:  Scheduled medications   Medication Dose Route Frequency    [START ON 6/10/2024] amiodarone  200 mg oral Every other day    amiodarone  400 mg oral BID    apixaban  5 mg oral BID    cefTRIAXone  1 g intravenous q24h    tiotropium  2 puff inhalation Daily    And    fluticasone furoate-vilanteroL  1 puff inhalation Daily    folic acid  1 mg oral Daily    lactated Ringer's  250 mL intravenous Once    multivitamin with minerals  1 tablet oral Daily    pantoprazole  40 mg oral Daily before breakfast    perflutren lipid microspheres  0.5-10 mL of dilution intravenous Once in imaging    [START ON 6/1/2024] thiamine  100 mg oral Daily     PRN medications   Medication    ipratropium-albuteroL    lidocaine     Continuous Medications   Medication Dose Last Rate       Physical Exam:  General: Pleasant elderly female, alert and oriented x 3, NAD  HEENT:  Pupils equal and reactive.  Normocephalic.  Moist mucosa.    Neck:  No thyromegaly.  Normal Jugular Venous Pressure.  Cardiovascular: Irregularly irregular rhythm. No cardiac murmurs noted.  Pulmonary:  Clear to auscultation bilaterally.  Abdomen:  Soft. Non-tender.   Non-distended.  Positive bowel sounds.  Lower Extremities:  2+ pedal pulses. No LE edema.  Neurologic:  Cranial nerves intact.  No focal deficit.   Skin: Skin warm and dry, normal skin turgor.   Psychiatric: Normal affect.     Assessment/Plan   Svitlana Perry is a 74 y.o. female with a past medical history of alcoholic cirrhosis of the liver c/b ascites and hepatic encephalopathy, alcoholic hepatitis(6/2020), COPD, hypertension, hyperlipidemia, pancreatitis, PVD. Recent admit 5/23-5/27/24 for generalized weakness (found covered in her feces)/ COPD exac and FTT. Patient presents today to the ED for further evaluation of syncope and collapsing, found to be in  atrial fibrillation RVR.  Cardiology consulted for further evaluation of atrial fibrillation RVR.     During ER course, she was treated with IV Metoprolol ( 2.5 mg X 2 followed by 5 mg X3 with additional fluid resusication. She was transferred for CT-H given fall with + LOC (CTH revealed no ICH or skull fractures). When she returned from CT Study, blood pressure ~ 72 and HR ~ 140s. ER proceeded with Cardioversion due to hemodynamic instability with apparent termination of atrial fibrillation into Sinus with PAC's.      I reviewed telemetry, SR, short burst of AF, Currently SR.     1.  New asymptomatic atrial fibrillation RVR. Prakash score 3  -TSH 3.38 (5/24/24)  -Started Eliquis (5/30/24)     2.  Syncope and collapse.  Unclear etiology.  Probable 2/2 AF RVR  Patient denies any complaints of chest pain, dyspnea, palpitations prior to syncopal event  She reports the day after syncopal event her neighbor noted brief uncontrollable body movements>?  Seizure activity  -Echo pending  - D Dimer elevated 5/23/24, CT Angio of chest was neg for PE  -Holter monitor prior to discharge> to evaluate AF burden and any other probable arrhythmias     3.  Hypertension.  Currently BP soft, monitoring     Recommendations  Patient currently SR after Cardioversion in the ED  Transitioned to Eliquis 5 mg PO BID today  Amiodarone loading as follows:  Amiodarone 400 mg PO BID x 10 days (last dose 6/9/24)  Then  Amiodarone 200 mg PO every other day thereafter (starting 6/10/24)  Hepatic Panel in 1 week  Monitor electrolytes> keep K greater than 4.0 and mag greater than 2.0, supplement as needed  Echocardiogram pending  Holter monitor prior to discharge (ordered)  Cardiology follow up with Dr. Laura 3-5 weeks post discharge   No cardiac barriers to discharge  Cardiology will sign off  Please call with questions       Code Status:  Full Code          GRUPO Camargo-CARMELINA    ======================================  Attending Note    ======================================  Both the BRADY and I have had a face to face encounter with the patient today. I have examined the patient and edited the documented physical examination as necessary.  I personally reviewed the patient's vital signs, telemetry, recent labs, medications, orders, EKGs, and pertinent cardiac imaging/ echocardiography.  I have reviewed the BRADY's encounter note, approve the BRADY's documentation and have edited the note to reflect my diagnostic and therapeutic plan.    Svitlana Perry is a 74 y.o. female with a past medical history of alcoholic cirrhosis of the liver c/b ascites and hepatic encephalopathy, alcoholic hepatitis(6/2020), COPD, hypertension, hyperlipidemia, pancreatitis, PVD. Recent admit 5/23-5/27/24 for generalized weakness (found covered in her feces)/ COPD exac and FTT. Patient presents today to the ED for further evaluation of syncope and collapsing, found to be in atrial fibrillation RVR. During ER course, she was treated with IV Metoprolol ( 2.5 mg X 2 followed by 5 mg X3 with additional fluid resusication. She was transferred for CT-H given fall with TLOC. When she returned from CT Study, blood pressure ~ 72 and HR ~ 140s. ER proceeded with Cardioversion due to hemodynamic instability with apparent termination of atrial fibrillation into Sinus with PAC's ( Repeated EKG Pending)      -- Continue on  Apixaban 5 mg BID  -- Continue on  oral amiodarone 400 mg BID x 10 days.  Thereafter, 200 mg every other day  -- Potassium > 4.5, Mag > 2.2      --Echocardiogram reviewed.  Preserved ejection fraction between 65 and 70%, normal diastology, mild-moderate mitral regurgitation, moderate TR with mild elevation of RVSP.  By echocardiography, IVC is very full suggesting AGAINST HYPOVOLEMIA as cause of syncope and collapse.    -- The patient will benefit from discharge with a Zio or Preventice Patch event monitor for 2 weeks.  Zio or Preventice Patch can only be placed by  [facility] nursing during regular business hours (9AM-4PM Monday-Friday) after the discharge order has been placed.  The results will be discussed at the patient's followup visit.        The patient will benefit from follow-up in Cardiology clinic within 3-5 weeks of discharge.  The patient or their family should call the [facility] at [phone] for [facility].  If the care team is assisting in scheduling a follow up appointment prior to their discharge, please ensure that the patient has committed to attending the scheduled date and time.     Thank you for allowing me to participate in their care.  Please feel free to call me with any further questions or concerns.    Cardiology will sign off at this time.  Disposition as per primary service.     Mike Laura DO   Clinical Cardology   [facility]  [facility]

## 2024-05-31 NOTE — CARE PLAN
"  Problem: Skin  Goal: Decreased wound size/increased tissue granulation at next dressing change  Outcome: Progressing  Flowsheets (Taken 5/30/2024 1710 by Stephany Santana RN)  Decreased wound size/increased tissue granulation at next dressing change: Promote sleep for wound healing  Goal: Participates in plan/prevention/treatment measures  Outcome: Progressing  Flowsheets (Taken 5/30/2024 1710 by Stephany Santana RN)  Participates in plan/prevention/treatment measures: Elevate heels  Goal: Prevent/manage excess moisture  Outcome: Progressing  Flowsheets (Taken 5/30/2024 1710 by Stephany Santana RN)  Prevent/manage excess moisture: Moisturize dry skin  Goal: Prevent/minimize sheer/friction injuries  Outcome: Progressing  Flowsheets (Taken 5/30/2024 1710 by Stephany Santana RN)  Prevent/minimize sheer/friction injuries: Use pull sheet  Goal: Promote/optimize nutrition  Outcome: Progressing  Flowsheets (Taken 5/30/2024 1710 by Stephany Santana RN)  Promote/optimize nutrition: Monitor/record intake including meals  Goal: Promote skin healing  Outcome: Progressing  Flowsheets (Taken 5/30/2024 1710 by Stephany Santana RN)  Promote skin healing: Assess skin/pad under line(s)/device(s)   The patient's goals for the shift include \"Get some sleep\"    The clinical goals for the shift include pt safety will be maintained in duration of the shift    Over the shift, the patient did make progress toward the following goals.   "

## 2024-06-01 ENCOUNTER — DOCUMENTATION (OUTPATIENT)
Dept: HOME HEALTH SERVICES | Facility: HOME HEALTH | Age: 74
End: 2024-06-01
Payer: COMMERCIAL

## 2024-06-01 ENCOUNTER — PHARMACY VISIT (OUTPATIENT)
Dept: PHARMACY | Facility: CLINIC | Age: 74
End: 2024-06-01
Payer: MEDICARE

## 2024-06-01 ENCOUNTER — HOME HEALTH ADMISSION (OUTPATIENT)
Dept: HOME HEALTH SERVICES | Facility: HOME HEALTH | Age: 74
End: 2024-06-01
Payer: COMMERCIAL

## 2024-06-01 VITALS
OXYGEN SATURATION: 99 % | WEIGHT: 132 LBS | DIASTOLIC BLOOD PRESSURE: 71 MMHG | TEMPERATURE: 98.9 F | SYSTOLIC BLOOD PRESSURE: 126 MMHG | HEIGHT: 65 IN | HEART RATE: 90 BPM | RESPIRATION RATE: 18 BRPM | BODY MASS INDEX: 21.99 KG/M2

## 2024-06-01 LAB
ALBUMIN SERPL BCP-MCNC: 2.6 G/DL (ref 3.4–5)
ANION GAP SERPL CALC-SCNC: 9 MMOL/L (ref 10–20)
BUN SERPL-MCNC: 6 MG/DL (ref 6–23)
CALCIUM SERPL-MCNC: 7.8 MG/DL (ref 8.6–10.3)
CHLORIDE SERPL-SCNC: 105 MMOL/L (ref 98–107)
CO2 SERPL-SCNC: 25 MMOL/L (ref 21–32)
CREAT SERPL-MCNC: 0.49 MG/DL (ref 0.5–1.05)
EGFRCR SERPLBLD CKD-EPI 2021: >90 ML/MIN/1.73M*2
GLUCOSE SERPL-MCNC: 97 MG/DL (ref 74–99)
HOLD SPECIMEN: NORMAL
MAGNESIUM SERPL-MCNC: 2 MG/DL (ref 1.6–2.4)
PHOSPHATE SERPL-MCNC: 3.1 MG/DL (ref 2.5–4.9)
POTASSIUM SERPL-SCNC: 4.4 MMOL/L (ref 3.5–5.3)
SODIUM SERPL-SCNC: 135 MMOL/L (ref 136–145)

## 2024-06-01 PROCEDURE — 94640 AIRWAY INHALATION TREATMENT: CPT | Mod: MUE

## 2024-06-01 PROCEDURE — 2500000001 HC RX 250 WO HCPCS SELF ADMINISTERED DRUGS (ALT 637 FOR MEDICARE OP)

## 2024-06-01 PROCEDURE — 80069 RENAL FUNCTION PANEL: CPT | Performed by: STUDENT IN AN ORGANIZED HEALTH CARE EDUCATION/TRAINING PROGRAM

## 2024-06-01 PROCEDURE — 2500000001 HC RX 250 WO HCPCS SELF ADMINISTERED DRUGS (ALT 637 FOR MEDICARE OP): Performed by: STUDENT IN AN ORGANIZED HEALTH CARE EDUCATION/TRAINING PROGRAM

## 2024-06-01 PROCEDURE — 2500000002 HC RX 250 W HCPCS SELF ADMINISTERED DRUGS (ALT 637 FOR MEDICARE OP, ALT 636 FOR OP/ED)

## 2024-06-01 PROCEDURE — 99239 HOSP IP/OBS DSCHRG MGMT >30: CPT | Performed by: INTERNAL MEDICINE

## 2024-06-01 PROCEDURE — RXMED WILLOW AMBULATORY MEDICATION CHARGE

## 2024-06-01 PROCEDURE — G0378 HOSPITAL OBSERVATION PER HR: HCPCS

## 2024-06-01 PROCEDURE — 83735 ASSAY OF MAGNESIUM: CPT | Performed by: STUDENT IN AN ORGANIZED HEALTH CARE EDUCATION/TRAINING PROGRAM

## 2024-06-01 PROCEDURE — 36415 COLL VENOUS BLD VENIPUNCTURE: CPT | Performed by: STUDENT IN AN ORGANIZED HEALTH CARE EDUCATION/TRAINING PROGRAM

## 2024-06-01 PROCEDURE — 5A2204Z RESTORATION OF CARDIAC RHYTHM, SINGLE: ICD-10-PCS | Performed by: EMERGENCY MEDICINE

## 2024-06-01 PROCEDURE — 2500000001 HC RX 250 WO HCPCS SELF ADMINISTERED DRUGS (ALT 637 FOR MEDICARE OP): Performed by: NURSE PRACTITIONER

## 2024-06-01 RX ORDER — CIPROFLOXACIN 500 MG/1
500 TABLET ORAL 2 TIMES DAILY
Qty: 14 TABLET | Refills: 0 | Status: SHIPPED | OUTPATIENT
Start: 2024-06-01 | End: 2024-06-14 | Stop reason: ALTCHOICE

## 2024-06-01 RX ORDER — LANOLIN ALCOHOL/MO/W.PET/CERES
100 CREAM (GRAM) TOPICAL DAILY
Qty: 30 TABLET | Refills: 0 | Status: SHIPPED | OUTPATIENT
Start: 2024-06-01 | End: 2024-07-01

## 2024-06-01 RX ORDER — AMIODARONE HYDROCHLORIDE 200 MG/1
200 TABLET ORAL EVERY OTHER DAY
Qty: 47 TABLET | Refills: 0 | Status: SHIPPED | OUTPATIENT
Start: 2024-06-10 | End: 2024-10-15 | Stop reason: ALTCHOICE

## 2024-06-01 RX ORDER — AMIODARONE HYDROCHLORIDE 400 MG/1
400 TABLET ORAL 2 TIMES DAILY
Qty: 16 TABLET | Refills: 0 | Status: SHIPPED | OUTPATIENT
Start: 2024-06-01 | End: 2024-06-14 | Stop reason: ALTCHOICE

## 2024-06-01 RX ADMIN — FLUTICASONE FUROATE AND VILANTEROL TRIFENATATE 1 PUFF: 100; 25 POWDER RESPIRATORY (INHALATION) at 09:06

## 2024-06-01 RX ADMIN — APIXABAN 5 MG: 5 TABLET, FILM COATED ORAL at 08:53

## 2024-06-01 RX ADMIN — AMIODARONE HYDROCHLORIDE 400 MG: 200 TABLET ORAL at 08:53

## 2024-06-01 RX ADMIN — TIOTROPIUM BROMIDE INHALATION SPRAY 2 PUFF: 3.12 SPRAY, METERED RESPIRATORY (INHALATION) at 09:05

## 2024-06-01 RX ADMIN — FOLIC ACID 1 MG: 1 TABLET ORAL at 08:53

## 2024-06-01 RX ADMIN — MULTIPLE VITAMINS W/ MINERALS TAB 1 TABLET: TAB at 08:53

## 2024-06-01 RX ADMIN — PANTOPRAZOLE SODIUM 40 MG: 40 TABLET, DELAYED RELEASE ORAL at 05:35

## 2024-06-01 ASSESSMENT — PAIN - FUNCTIONAL ASSESSMENT
PAIN_FUNCTIONAL_ASSESSMENT: 0-10
PAIN_FUNCTIONAL_ASSESSMENT: 0-10

## 2024-06-01 ASSESSMENT — COGNITIVE AND FUNCTIONAL STATUS - GENERAL
HELP NEEDED FOR BATHING: A LITTLE
TOILETING: A LITTLE
DRESSING REGULAR UPPER BODY CLOTHING: A LITTLE
CLIMB 3 TO 5 STEPS WITH RAILING: A LOT
DAILY ACTIVITIY SCORE: 20
MOVING TO AND FROM BED TO CHAIR: A LITTLE
MOBILITY SCORE: 19
WALKING IN HOSPITAL ROOM: A LITTLE
TURNING FROM BACK TO SIDE WHILE IN FLAT BAD: A LITTLE
DRESSING REGULAR LOWER BODY CLOTHING: A LITTLE

## 2024-06-01 ASSESSMENT — PAIN SCALES - GENERAL
PAINLEVEL_OUTOF10: 0 - NO PAIN
PAINLEVEL_OUTOF10: 0 - NO PAIN

## 2024-06-01 ASSESSMENT — PAIN DESCRIPTION - DESCRIPTORS: DESCRIPTORS: SORE

## 2024-06-01 NOTE — CARE PLAN
"The patient's goals for the shift include \"Get some sleep\"    The clinical goals for the shift include Patient to be able to rest    Over the shift, the patient did not make progress toward the following goals. Barriers to progression include none. Recommendations to address these barriers include bundling of care to allow adequate rest.    "

## 2024-06-01 NOTE — NURSING NOTE

## 2024-06-01 NOTE — HH CARE COORDINATION
Home Care received a Referral for Nursing, Physical Therapy, and Occupational Therapy. We have processed the referral for a Start of Care on 24-48 hours .     If you have any questions or concerns, please feel free to contact us at [phone]. Follow the prompts, enter your five digit zip code, and you will be directed to your care team on [facility].

## 2024-06-02 LAB
BACTERIA BLD CULT: NORMAL
BACTERIA BLD CULT: NORMAL
BACTERIA UR CULT: ABNORMAL
BACTERIA UR CULT: ABNORMAL

## 2024-06-03 ENCOUNTER — PATIENT OUTREACH (OUTPATIENT)
Dept: PRIMARY CARE | Facility: CLINIC | Age: 74
End: 2024-06-03
Payer: COMMERCIAL

## 2024-06-03 NOTE — PROGRESS NOTES
Discharge Facility: [facility]  Discharge Diagnosis: Atrial fibrillation with RVR; UTI  Admission Date:  5/30/2024  Discharge Date:  6/1/2024    PCP Appointment Date: 6/5/2024  Specialist Appointment Date: 6/14/2024 Cardiology  Hospital Encounter and Summary: Linked   See discharge assessment below for further details  Engagement  Call Start Time: 1121 (6/3/2024 11:29 AM)    Medications  Medications reviewed with patient/caregiver?: Yes (new meds discussed with patient) (6/3/2024 11:29 AM)  Is the patient having any side effects they believe may be caused by any medication additions or changes?: No (6/3/2024 11:29 AM)  Does the patient have all medications ordered at discharge?: Yes (6/3/2024 11:29 AM)  Care Management Interventions: No intervention needed (6/3/2024 11:29 AM)  Prescription Comments: see med list (cipro; thiamine; amiodarone; eliquis) (6/3/2024 11:29 AM)  Is the patient taking all medications as directed (includes completed medication regime)?: Yes (6/3/2024 11:29 AM)  Care Management Interventions: Provided patient education (6/3/2024 11:29 AM)    Appointments  Does the patient have a primary care provider?: Yes (5/28/2024 11:42 AM)  Care Management Interventions: Verified appointment date/time/provider (6/3/2024 11:29 AM)  Has the patient kept scheduled appointments due by today?: Yes (6/3/2024 11:29 AM)  Care Management Interventions: Advised patient to keep appointment (6/3/2024 11:29 AM)    Self Management  What is the home health agency?: [facility] (6/3/2024 11:29 AM)  Has home health visited the patient within 72 hours of discharge?: Yes (6/3/2024 11:29 AM)  What Durable Medical Equipment (DME) was ordered?: n/a (6/3/2024 11:29 AM)  Has all Durable Medical Equipment (DME) been delivered?: No (5/28/2024 11:42 AM)    Patient Teaching  Does the patient have access to their discharge instructions?: Yes (6/3/2024 11:29 AM)  Care Management Interventions: Reviewed instructions with patient  (6/3/2024 11:29 AM)  What is the patient's perception of their health status since discharge?: Improving (6/3/2024 11:29 AM)  Is the patient/caregiver able to teach back the hierarchy of who to call/visit for symptoms/problems? PCP, Specialist, Home Health nurse, Urgent Care, ED, 911: Yes (6/3/2024 11:29 AM)  Patient/Caregiver Education Comments: Successful transition of care outreach with patient. Patient reports doing well at home since discharge. New meds reviewed with patient during outreach. Patient denies CP/SOB. Patient denies further discharge questions/concerns/needs at time of outreach call. Emphasized that follow up appts are needed after discharge with PCP and reviewed needed follow ups with any specialties to assess response to treatment from hospitalization. Patient aware of my availability for non-emergent concerns. Contact information provided to the patient. (6/3/2024 11:29 AM)    Wrap Up  Call End Time: 1150 (5/28/2024 11:42 AM)

## 2024-06-05 ENCOUNTER — OFFICE VISIT (OUTPATIENT)
Dept: PRIMARY CARE | Facility: CLINIC | Age: 74
End: 2024-06-05
Payer: COMMERCIAL

## 2024-06-05 ENCOUNTER — HOME CARE VISIT (OUTPATIENT)
Dept: HOME HEALTH SERVICES | Facility: HOME HEALTH | Age: 74
End: 2024-06-05
Payer: COMMERCIAL

## 2024-06-05 VITALS
DIASTOLIC BLOOD PRESSURE: 60 MMHG | RESPIRATION RATE: 20 BRPM | SYSTOLIC BLOOD PRESSURE: 120 MMHG | OXYGEN SATURATION: 98 % | TEMPERATURE: 98.1 F | HEART RATE: 90 BPM

## 2024-06-05 VITALS
BODY MASS INDEX: 21.97 KG/M2 | RESPIRATION RATE: 12 BRPM | WEIGHT: 132 LBS | HEART RATE: 93 BPM | SYSTOLIC BLOOD PRESSURE: 118 MMHG | OXYGEN SATURATION: 98 % | DIASTOLIC BLOOD PRESSURE: 67 MMHG

## 2024-06-05 DIAGNOSIS — N39.0 URINARY TRACT INFECTION WITHOUT HEMATURIA, SITE UNSPECIFIED: ICD-10-CM

## 2024-06-05 DIAGNOSIS — R53.1 GENERALIZED WEAKNESS: Primary | ICD-10-CM

## 2024-06-05 DIAGNOSIS — E44.1 MILD PROTEIN-CALORIE MALNUTRITION (MULTI): ICD-10-CM

## 2024-06-05 DIAGNOSIS — I10 HYPERTENSION, UNSPECIFIED TYPE: ICD-10-CM

## 2024-06-05 DIAGNOSIS — R53.82 CHRONIC FATIGUE: ICD-10-CM

## 2024-06-05 DIAGNOSIS — R60.0 LEG EDEMA: ICD-10-CM

## 2024-06-05 PROBLEM — K37 UNSPECIFIED APPENDICITIS: Status: ACTIVE | Noted: 2020-06-09

## 2024-06-05 PROBLEM — D64.9 ANEMIA, UNSPECIFIED: Status: ACTIVE | Noted: 2020-06-09

## 2024-06-05 PROBLEM — D72.829 ELEVATED WHITE BLOOD CELL COUNT, UNSPECIFIED: Status: ACTIVE | Noted: 2020-06-05

## 2024-06-05 PROBLEM — R09.02 HYPOXEMIA: Status: ACTIVE | Noted: 2020-06-09

## 2024-06-05 PROBLEM — K85.20: Status: ACTIVE | Noted: 2020-06-05

## 2024-06-05 PROCEDURE — G0299 HHS/HOSPICE OF RN EA 15 MIN: HCPCS

## 2024-06-05 RX ORDER — FOLIC ACID 1 MG/1
1 TABLET ORAL DAILY
Qty: 30 TABLET | Refills: 11 | Status: SHIPPED | OUTPATIENT
Start: 2024-06-05 | End: 2025-06-05

## 2024-06-05 RX ORDER — SPIRONOLACTONE 25 MG/1
25 TABLET ORAL DAILY
Qty: 30 TABLET | Refills: 5 | Status: SHIPPED | OUTPATIENT
Start: 2024-06-05 | End: 2024-12-02

## 2024-06-05 RX ORDER — CALCIUM CARBONATE/VITAMIN D3 500-10/5ML
1 LIQUID (ML) ORAL NIGHTLY
Qty: 90 CAPSULE | Refills: 3 | Status: SHIPPED | OUTPATIENT
Start: 2024-06-05

## 2024-06-05 RX ORDER — POTASSIUM CHLORIDE 750 MG/1
10 TABLET, FILM COATED, EXTENDED RELEASE ORAL DAILY
Qty: 30 TABLET | Refills: 11 | Status: SHIPPED | OUTPATIENT
Start: 2024-06-05 | End: 2025-06-05

## 2024-06-05 SDOH — ECONOMIC STABILITY: FOOD INSECURITY: MEALS PER DAY: 2

## 2024-06-05 ASSESSMENT — ENCOUNTER SYMPTOMS
CHANGE IN APPETITE: UNCHANGED
APPETITE LEVEL: FAIR
PAIN LOCATION: GENERALIZED
MUSCLE WEAKNESS: 1
PERSON REPORTING PAIN: PATIENT
BOWEL INCONTINENCE: 1
LAST BOWEL MOVEMENT: 66996
PAIN: 1

## 2024-06-05 ASSESSMENT — ACTIVITIES OF DAILY LIVING (ADL)
ENTERING_EXITING_HOME: MODERATE ASSIST
OASIS_M1830: 03
CURRENT_FUNCTION: ONE PERSON
AMBULATION ASSISTANCE: ONE PERSON

## 2024-06-05 ASSESSMENT — LIFESTYLE VARIABLES: SMOKING_STATUS: 1

## 2024-06-05 NOTE — PROGRESS NOTES
Subjective   Chief complaint: Svitlana Perry is a 74 y.o. female who presents for Hospital Follow-up (Pt is being seen today for hospital follow up for A-fib).    HPI:  HPI    Patient in clinic for follow up hospital visit on 5/23/24 got DC and readmitted on 5/29/24 She reports not being able to walk, generalized weakness and SOB. The second time she was admitted she was on AFIB RVR and UTI. Patient got cardioverted and started on Amio 400 mg, Apixaban 5mg, CIPRO 500 mg, and Thiamine. Pt continues to take diet supplementary drinks.     Pt denies SOB, chest pain, N/V, or diarrhea. She continues to experience dyspnea on exertion. But reports strength is coming back.     Objective   /67   Pulse 93   Resp 12   Wt 59.9 kg (132 lb)   SpO2 98%   BMI 21.97 kg/m²     Physical Exam  Vitals reviewed.   Constitutional:       General: She is not in acute distress.     Appearance: Normal appearance. She is not ill-appearing, toxic-appearing or diaphoretic.   HENT:      Head: Normocephalic and atraumatic.      Mouth/Throat:      Mouth: Mucous membranes are moist.      Pharynx: Oropharynx is clear.   Eyes:      Extraocular Movements: Extraocular movements intact.      Conjunctiva/sclera: Conjunctivae normal.      Pupils: Pupils are equal, round, and reactive to light.   Cardiovascular:      Rate and Rhythm: Normal rate and regular rhythm.      Pulses: Normal pulses.      Heart sounds: Normal heart sounds.   Pulmonary:      Effort: Pulmonary effort is normal.      Breath sounds: Normal breath sounds.   Abdominal:      General: Bowel sounds are normal.      Palpations: Abdomen is soft.   Musculoskeletal:         General: Normal range of motion.      Cervical back: Normal range of motion.   Skin:     General: Skin is warm and dry.      Capillary Refill: Capillary refill takes less than 2 seconds.   Neurological:      General: No focal deficit present.      Mental Status: She is alert and oriented to person, place, and  time.         I have reviewed and reconciled the medication list with the patient today.   Current Outpatient Medications:     albuterol 90 mcg/actuation inhaler, Inhale 2 puffs every 4 hours if needed., Disp: , Rfl:     apixaban (Eliquis) 5 mg tablet, Take 1 tablet (5 mg) by mouth 2 times a day., Disp: 60 tablet, Rfl: 0    ciprofloxacin (Cipro) 500 mg tablet, Take 1 tablet (500 mg) by mouth 2 times a day for 7 days., Disp: 14 tablet, Rfl: 0    fluticasone-umeclidin-vilanter (Trelegy Ellipta) 100-62.5-25 mcg blister with device, Inhale 1 puff once daily., Disp: 30 each, Rfl: 0    [START ON 6/10/2024] amiodarone (Pacerone) 200 mg tablet, Take 2 tablets by mouth two times a day for 8 days, then starting June 10th, 2024 take 1 tablet (200 mg) by mouth every other day., Disp: 47 tablet, Rfl: 0    amiodarone (Pacerone) 400 mg tablet, Take 1 tablet (400 mg) by mouth 2 times a day for 16 doses., Disp: 16 tablet, Rfl: 0    ipratropium-albuteroL (Duo-Neb) 0.5-2.5 mg/3 mL nebulizer solution, USE 1 UNIT DOSE VIA NEBULIZER THREE TIMES DAILY., Disp: 270 mL, Rfl: 1    thiamine (Vitamin B-1) 100 mg tablet, Take 1 tablet (100 mg) by mouth once daily., Disp: 30 tablet, Rfl: 0    Current Facility-Administered Medications:     fluticasone-umeclidin-vilanter (TRELEGY-ELLIPTA) 100-62.5-25 mcg 100-62.5-25 mcg/puff inhaler 1 puff, 1 puff, inhalation, BID, Demarco Gonzalez MD     Imaging:  Transthoracic Echo (TTE) Complete    Result Date: 5/31/2024   [facility], [address] [place] [address]              Tel [phone] and Fax [phone] TRANSTHORACIC ECHOCARDIOGRAM REPORT  Patient Name:      JARRED Gipson Physician:    [id number] Dipti Laura DO Study Date:        5/31/2024            Ordering Provider:    [id number] DIPTI TIWARI MRN/PID:           [id number]             Fellow:  Accession#:        [id number]         Nurse: Date of Birth/Age: 1950 / 74 years Sonographer:          Laurie Koroma RDCS Gender:            F                    Additional Staff: Height:            165.00 cm            Admit Date:           5/29/2024 Weight:            60.00 kg             Admission Status:     Inpatient -                                                               Routine BSA / BMI:         1.66 m2 / 22.04      Encounter#:           [id number]                    kg/m2                                         Department Location:  [facility] Non                                                               Invasive Blood Pressure: 101 /61 mmHg Study Type:    TRANSTHORACIC ECHO (TTE) COMPLETE Diagnosis/ICD: Paroxysmal atrial fibrillation-I48.0; Elevated Troponin-R79.89 Indication:    A-fib, Elevated Troponin CPT Code:      Echo Complete w Full Doppler-19653 Patient History: Pertinent History: PVD, HTN, Hyperlipidemia, Syncope, A-Fib, Dyspnea and COPD.                    Alcoholic Cirrhosis. Study Detail: The following Echo studies were performed: 2D, M-Mode, Doppler and               color flow. Agitated saline used as a contrast agent for               intraseptal flow evaluation.  PHYSICIAN INTERPRETATION: Left Ventricle: The left ventricular systolic function is normal, with an estimated ejection fraction of 65-70%. The calculated ejection fraction is normal at 68 % using the Ramirez's Bi-plane MOD calculation. There are no regional wall motion abnormalities. The left ventricular cavity size is normal. Left Ventricular Global Longitudinal Strain - 17.0 %. Spectral Doppler shows an abnormal pattern of left ventricular diastolic filling. Strain values are normal, which imply normal myocardial function. Left Atrium: The left atrium is mildly dilated. There is no evidence of a patent foramen ovale. A bubble study using agitated saline was performed. Bubble study is negative. There is evidence of  an atrial septal aneurysm. Right Ventricle: The right ventricle is normal in size. There is normal right ventricular global systolic function. Right Atrium: The right atrium is normal in size. Aortic Valve: The aortic valve is trileaflet. There is mild aortic valve cusp calcification. There is evidence of mildly elevated transaortic gradients consistent with sclerosis of the aortic valve. There is no evidence of aortic valve regurgitation. The peak instantaneous gradient of the aortic valve is 11.5 mmHg. The mean gradient of the aortic valve is 6.5 mmHg. Mitral Valve: The mitral valve is mildly thickened. There is mild mitral valve regurgitation. Regurgitant volume 11 ml. Tricuspid Valve: The tricuspid valve is structurally normal. There is moderate tricuspid regurgitation. The Doppler estimated RVSP is mildly elevated at 46.3 mmHg. Pulmonic Valve: The pulmonic valve is structurally normal. There is physiologic pulmonic valve regurgitation. Pericardium: There is no pericardial effusion noted. Aorta: The aortic root is normal. There is no dilatation of the ascending aorta. There is no dilatation of the aortic root. Systemic Veins: The inferior vena cava appears mildly dilated. There is poor inspiratory collapse of the IVC (less than 50%), consistent with elevated right atrial pressure. In comparison to the previous echocardiogram(s): Compared with study from 9/12/2016,.  CONCLUSIONS:  1. Left ventricular systolic function is normal with a 65-70% estimated ejection fraction.  2. Spectral Doppler shows an abnormal pattern of left ventricular diastolic filling.  3. Mild mitral valve regurgitation.  4. Mildly elevated RVSP.  5. Moderate tricuspid regurgitation visualized.  6. Aortic valve sclerosis.  7. Atrial septal aneurysm present.  8. There is no evidence of a patent foramen ovale. QUANTITATIVE DATA SUMMARY: 2D MEASUREMENTS:                          Normal Ranges: LAs:           3.32 cm   (2.7-4.0cm) IVSd:           0.84 cm   (0.6-1.1cm) LVPWd:         0.82 cm   (0.6-1.1cm) LVIDd:         4.48 cm   (3.9-5.9cm) LVIDs:         3.20 cm LV Mass Index: 71.7 g/m2 LV % FS        28.6 % LA VOLUME:                               Normal Ranges: LA Vol A4C:        56.6 ml    (22+/-6mL/m2) LA Vol A2C:        48.3 ml LA Vol BP:         54.7 ml LA Vol Index A4C:  34.1ml/m2 LA Vol Index A2C:  29.1 ml/m2 LA Vol Index BP:   33.0 ml/m2 LA Area A4C:       18.6 cm2 LA Area A2C:       18.0 cm2 LA Major Axis A4C: 5.2 cm LA Major Axis A2C: 5.7 cm LA Volume Index:   32.6 ml/m2 LA Vol A4C:        50.7 ml LA Vol A2C:        47.3 ml RA VOLUME BY A/L METHOD:                               Normal Ranges: RA Vol A4C:        37.8 ml    (8.3-19.5ml) RA Vol Index A4C:  22.8 ml/m2 RA Area A4C:       14.3 cm2 RA Major Axis A4C: 4.6 cm M-MODE MEASUREMENTS:                  Normal Ranges: Ao Root: 2.90 cm (2.0-3.7cm) LAs:     4.29 cm (2.7-4.0cm) AORTA MEASUREMENTS:                      Normal Ranges: Ao Sinus, d: 3.00 cm (2.1-3.5cm) Ao STJ, d:   2.20 cm (1.7-3.4cm) Asc Ao, d:   2.80 cm (2.1-3.4cm) LV SYSTOLIC FUNCTION BY 2D PLANIMETRY (MOD):                                          Normal Ranges: EF-A4C View:                      66.5 % (>=55%) EF-A2C View:                      68.6 % EF-Biplane:                       67.8 % Global Longitudinal Strain (GLS): 17.0 % LV DIASTOLIC FUNCTION:                               Normal Ranges: MV Peak E:        1.01 m/s    (0.7-1.2 m/s) MV Peak A:        0.90 m/s    (0.42-0.7 m/s) E/A Ratio:        1.12        (1.0-2.2) MV e'             0.09 m/s    (>8.0) MV lateral e'     0.09 m/s MV medial e'      0.10 m/s MV A Dur:         104.66 msec E/e' Ratio:       11.27       (<8.0) a'                0.07 m/s PulmV Sys Lopez:    46.62 cm/s PulmV Mathews Lopez:   43.42 cm/s PulmV S/D Lopez:    1.07 PulmV A Revs Lopez: 15.36 cm/s PulmV A Revs Dur: 98.95 msec MITRAL VALVE:                 Normal Ranges: MV DT: 183 msec (150-240msec) MITRAL  INSUFFICIENCY:                         Normal Ranges: MR VTI:     136.01 cm MR Vmax:    447.60 cm/s MR Volume:  10.66 ml MR Flow Rt: 35.07 ml/s MR EROA:    0.08 cm2 AORTIC VALVE:                                    Normal Ranges: AoV Vmax:                1.70 m/s  (<=1.7m/s) AoV Peak P.5 mmHg (<20mmHg) AoV Mean P.5 mmHg  (1.7-11.5mmHg) LVOT Max Lopez:            0.92 m/s  (<=1.1m/s) AoV VTI:                 33.89 cm  (18-25cm) LVOT VTI:                19.27 cm LVOT Diameter:           1.91 cm   (1.8-2.4cm) AoV Area, VTI:           1.62 cm2  (2.5-5.5cm2) AoV Area,Vmax:           1.55 cm2  (2.5-4.5cm2) AoV Dimensionless Index: 0.57  RIGHT VENTRICLE: RV Basal 3.80 cm RV Mid   3.00 cm RV Major 6.8 cm TAPSE:   20.0 mm RV s'    0.16 m/s TRICUSPID VALVE/RVSP:                             Normal Ranges: Peak TR Velocity: 3.29 m/s Est. RA Pressure: 3 mmHg RV Syst Pressure: 46.3 mmHg (< 30mmHg) IVC Diam:         2.15 cm PULMONIC VALVE:                      Normal Ranges: RVOT Vmax:  0.56 m/s (0.6-0.9m/s) PV Max Lopez: 0.8 m/s  (0.6-0.9m/s) PV Max P.4 mmHg Pulmonary Veins: PulmV A Revs Dur: 98.95 msec PulmV A Revs Lopez: 15.36 cm/s PulmV Mathews Lopez:   43.42 cm/s PulmV S/D Lopez:    1.07 PulmV Sys Lopez:    46.62 cm/s AORTA: Asc Ao Diam 2.75 cm  [id number] Mike Laura DO Electronically signed on 2024 at 1:43:12 PM  ** Final **     ECG 12 lead    Result Date: 2024  Sinus tachycardia with occasional Premature ventricular complexes Nonspecific T wave abnormality Abnormal ECG When compared with ECG of 29-MAY-2024 12:34, Sinus rhythm has replaced Atrial fibrillation Vent. rate has decreased BY  61 BPM ST no longer depressed in Anterior leads Nonspecific T wave abnormality has replaced inverted T waves in Inferior leads Nonspecific T wave abnormality has replaced inverted T waves in Anterolateral leads See ED provider note for full interpretation and clinical correlation Confirmed by Bilderback,  Amber ([id number]) on 5/29/2024 6:19:14 PM    CT cervical spine wo IV contrast    Result Date: 5/29/2024  Interpreted By:  Schoenberger, Joseph, STUDY: CT CERVICAL SPINE WO IV CONTRAST;  5/29/2024 2:51 pm   INDICATION: Signs/Symptoms:fall.   COMPARISON: None.   ACCESSION NUMBER(S): [id number]   ORDERING CLINICIAN: LOLY KNOX   TECHNIQUE: Axial CT images of the cervical spine are obtained. Axial, coronal and sagittal reconstructions are provided for review.   FINDINGS:     Fractures: There is no evidence for an acute fracture of the cervical spine.   Vertebral Alignment: There is straightening of the normal cervical lordotic curve. The sagittal alignment of the vertebra is otherwise maintained.   Craniocervical Junction: The odontoid process and craniocervical junction are intact.   Vertebrae/Disc Spaces:  All cervical vertebra are normal in height without vertebral body fracture. There is moderate degenerative narrowing of the C5 disc with mild narrowing of the C6 disc. Other discs are maintained in height.   Prevertebral/Paraspinal Soft Tissues: The prevertebral and paraspinal soft tissues are unremarkable.   Posterior elements are aligned and intact other than some mild degenerative changes. There is no acute fracture or subluxation.       Degenerative changes without acute fracture or subluxation.   MACRO: None   Signed by: Joseph Schoenberger 5/29/2024 3:27 PM Dictation workstation:   [id number]    CT head wo IV contrast    Result Date: 5/29/2024  Interpreted By:  Schoenberger, Joseph, STUDY: CT HEAD WO IV CONTRAST;  5/29/2024 2:51 pm   INDICATION: Signs/Symptoms:fall.   COMPARISON: None.   ACCESSION NUMBER(S): [id number]   ORDERING CLINICIAN: LOLY KNOX   TECHNIQUE: Noncontrast axial CT scan of head was performed. Angled reformats in brain and bone windows were generated. The images were reviewed in bone, brain, blood and soft tissue windows.   FINDINGS: CSF Spaces: Enlarged due to parenchymal  volume loss. Normal configuration with intact basal cisterns. There is no extraaxial fluid collection.   Parenchyma: Periventricular confluent deep white matter hypoattenuation consistent with small vessel ischemic white matter demyelination. The grey-white differentiation is intact. There is no mass effect or midline shift.  There is no intracranial hemorrhage.   Calvarium: The calvarium is unremarkable.   Paranasal sinuses and mastoids: Visualized paranasal sinuses and mastoids are clear.       Atrophy and chronic ischemic white matter demyelination without acute intracranial finding.   No evidence of intracranial hemorrhage or displaced skull fracture.   MACRO: None   Signed by: Joseph Schoenberger 5/29/2024 3:25 PM Dictation workstation:   [id number]    ECG 12 lead    Result Date: 5/29/2024  Atrial fibrillation with rapid ventricular response ST & T wave abnormality, consider inferior ischemia ST & T wave abnormality, consider anterolateral ischemia Abnormal ECG When compared with ECG of 23-MAY-2024 12:06, Atrial fibrillation has replaced Sinus rhythm Vent. rate has increased BY  58 BPM Inverted T waves have replaced nonspecific T wave abnormality in Inferior leads Inverted T waves have replaced nonspecific T wave abnormality in Lateral leads See ED provider note for full interpretation and clinical correlation Confirmed by Amber Roth ([id number]) on 5/29/2024 3:05:44 PM    XR pelvis 1-2 views    Result Date: 5/29/2024  STUDY: Pelvis Radiographs; 05/29/2024 1:52PM INDICATION: Status post fall. COMPARISON: XR Pelvis 05/25/2020, CT Abdomen and Pelvis 05/25/2020. ACCESSION NUMBER(S): [id number] ORDERING CLINICIAN: LOLY KNOX TECHNIQUE:  One view(s) of the pelvis. FINDINGS:  The pelvic ring is intact.  There is no acute fracture.      No acute osseous abnormality. Moderate stool in the colon. Signed by Timbo Ghosh MD    XR chest 1 view    Result Date: 5/29/2024  STUDY: Chest Radiograph;  05/29/2024  1:52PM INDICATION: Status post fall. COMPARISON: XR Chest 05/23/2024 ACCESSION NUMBER(S): [id number] ORDERING CLINICIAN: LOLY KNOX TECHNIQUE:  Frontal chest was obtained at 13:51 hours. FINDINGS: CARDIOMEDIASTINAL SILHOUETTE: Cardiomediastinal silhouette is normal in size and configuration.  LUNGS: Lungs are clear.  ABDOMEN: No remarkable upper abdominal findings.  BONES: No acute osseous changes.    No acute process. Signed by Timbo Ghosh MD    ECG 12 lead    Result Date: 5/25/2024  Sinus tachycardia with occasional Premature ventricular complexes Nonspecific ST and T wave abnormality Abnormal ECG When compared with ECG of 14-SEP-2023 18:33, Previous ECG has undetermined rhythm, needs review Nonspecific T wave abnormality, worse in Inferior leads T wave inversion now evident in Anterior leads See ED provider note for full interpretation and clinical correlation Confirmed by Amber Roth ([id number]) on 5/25/2024 5:04:03 PM    CT angio chest for pulmonary embolism    Result Date: 5/23/2024  Interpreted By:  Catarino Parra, STUDY: CT ANGIO CHEST FOR PULMONARY EMBOLISM;  5/23/2024 5:28 pm   INDICATION: Signs/Symptoms:sob, tachy.   COMPARISON: 09/14/2023   ACCESSION NUMBER(S): [id number]   ORDERING CLINICIAN: NIKOS SPENCER   TECHNIQUE: Helical data acquisition of the chest was obtained with IV contrast material. 75 mL of Omnipaque 350. MIP reconstructions. Images were reformatted in axial, coronal, and sagittal planes.   FINDINGS: Lungs and Pleura: Few small 3 mm right upper lobe pulmonary nodules. Calcified granuloma. No pulmonary consolidation. No pleural effusion. No pneumothorax.   Mediastinum and axilla: No evidence of pulmonary embolism. No adenopathy by CT size criteria. No cardiomegaly or pericardial effusion. No thoracic aortic aneurysm. Atherosclerosis. Advanced coronary artery calcifications. Small amount of mucus in the trachea.   Visualized Upper Abdomen: Diffuse hepatic hypodensity  suggestive of hepatic steatosis. Calcified granuloma in the spleen.   MSK/Chest Wall: No aggressive bony lesion identified. Scattered small marginal osteophytes in the spine.       No evidence of pulmonary embolism.   Right upper lobe 3 mm pulmonary nodules. No further follow-up is required, however, if the patient has high risk factors for primary lung malignancy, follow-up noncontrast CT scan chest in 12 months may be obtained. (Sandro Taohoraven et al., Guidelines for management of incidental pulmonary nodules detected on CT images: From the Fleischner Society 2017, Radiology. 2017 Jul;284 (1):228-243.)   Atherosclerosis and coronary artery calcifications.   Signed by: Catarino Parra 5/23/2024 5:59 PM Dictation workstation:   [id number]    US gallbladder    Result Date: 5/23/2024  Interpreted By:  Kym Verma, STUDY: US GALLBLADDER;  5/23/2024 2:53 pm   INDICATION: Signs/Symptoms:Significantly elevated bilirubin and alk phos/transaminitis compared to priors..   COMPARISON: None.   ACCESSION NUMBER(S): [id number]   ORDERING CLINICIAN: NIKOS SPENCER   TECHNIQUE: Multiple images of the abdomen were obtained.   FINDINGS: LIVER: The echogenicity of the liver is increased in coarsened consistent with fatty infiltration. There is no hepatic mass. The sagittal dimension of the right lobe of the liver is 18.3 cm, nonenlarged   GALLBLADDER: The gallbladder is nondistended. The gallbladder wall is not thickened. There are no gallstones. There is no sludge. There is no pericholecystic fluid.     BILE DUCTS: There is no intrahepatic biliary dilatation. The common bile duct is  nondilated measuring 0.4 cm.   PANCREAS: Completely obscured secondary to shadowing from bowel gas   RIGHT KIDNEY: The right kidney is  normal in size measuring 10.6 cm in length.   The echogenicity of the cortex is within normal limits. There is no renal mass. There is no intrarenal calculus or hydronephrosis.       1. Fatty infiltration of the  liver. This can cause abnormal liver function tests. 2.   MACRO: None   Signed by: Kym Verma 5/23/2024 2:59 PM Dictation workstation:   [id number]    XR chest 1 view    Result Date: 5/23/2024  Interpreted By:  Mike Masters, STUDY: XR CHEST 1 VIEW;  5/23/2024 12:10 pm   INDICATION: Signs/Symptoms:Chest Pain.   COMPARISON: None.   ACCESSION NUMBER(S): [id number]   ORDERING CLINICIAN: NIKOS SPENCER   FINDINGS:     CARDIOMEDIASTINAL SILHOUETTE: Cardiomediastinal silhouette is normal in size and configuration.   LUNGS: No consolidation, pneumothorax, or significant effusion.   ABDOMEN: No remarkable upper abdominal findings.   BONES: No acute osseous changes.       1.  No evidence of acute cardiopulmonary process.     Signed by: Mike Masters 5/23/2024 12:24 PM Dictation workstation:   [id number]       Labs reviewed:    Lab Results   Component Value Date    WBC 8.5 05/31/2024    HGB 8.3 (L) 05/31/2024    HCT 23.8 (L) 05/31/2024     05/31/2024    CHOL 224 (H) 02/16/2023    TRIG 82 02/16/2023    HDL 55.5 02/16/2023    ALT 35 05/29/2024    AST 40 (H) 05/29/2024     (L) 06/01/2024    K 4.4 06/01/2024     06/01/2024    CREATININE 0.49 (L) 06/01/2024    BUN 6 06/01/2024    CO2 25 06/01/2024    TSH 3.38 05/24/2024    INR 1.2 (H) 05/29/2024    HGBA1C 4.1 05/24/2024       Assessment/Plan   Problem List Items Addressed This Visit       Fatigue    HTN (hypertension)    Leg edema     Start Spironolactone 25 mg daily         UTI (urinary tract infection)     Finish Cipro treatment          Generalized weakness - Primary    Mild protein-calorie malnutrition (Multi)       Continue current medications as listed  Follow up in 4 weeks

## 2024-06-07 ENCOUNTER — HOME CARE VISIT (OUTPATIENT)
Dept: HOME HEALTH SERVICES | Facility: HOME HEALTH | Age: 74
End: 2024-06-07
Payer: COMMERCIAL

## 2024-06-07 PROCEDURE — G0151 HHCP-SERV OF PT,EA 15 MIN: HCPCS

## 2024-06-07 SDOH — ECONOMIC STABILITY: HOUSING INSECURITY: HOME SAFETY: FALL RISK. FREQUENT HOSPITALIZATIONS.

## 2024-06-07 ASSESSMENT — ENCOUNTER SYMPTOMS
LOWEST PAIN SEVERITY IN PAST 24 HOURS: 0/10
PAIN SEVERITY GOAL: 0/10
PAIN LOCATION - PAIN FREQUENCY: INTERMITTENT
SUBJECTIVE PAIN PROGRESSION: UNCHANGED
PAIN LOCATION - PAIN QUALITY: DULL
PAIN LOCATION - PAIN SEVERITY: 2/10
HIGHEST PAIN SEVERITY IN PAST 24 HOURS: 1/10

## 2024-06-09 ASSESSMENT — ENCOUNTER SYMPTOMS
PAIN: 1
PERSON REPORTING PAIN: PATIENT
MUSCLE WEAKNESS: 1

## 2024-06-10 ENCOUNTER — HOME CARE VISIT (OUTPATIENT)
Dept: HOME HEALTH SERVICES | Facility: HOME HEALTH | Age: 74
End: 2024-06-10
Payer: COMMERCIAL

## 2024-06-10 DIAGNOSIS — J44.9 CHRONIC OBSTRUCTIVE PULMONARY DISEASE, UNSPECIFIED COPD TYPE (MULTI): Primary | ICD-10-CM

## 2024-06-10 DIAGNOSIS — J45.909 ASTHMA, UNSPECIFIED ASTHMA SEVERITY, UNSPECIFIED WHETHER COMPLICATED, UNSPECIFIED WHETHER PERSISTENT (HHS-HCC): ICD-10-CM

## 2024-06-10 PROCEDURE — G0157 HHC PT ASSISTANT EA 15: HCPCS | Mod: CQ

## 2024-06-10 ASSESSMENT — ENCOUNTER SYMPTOMS
SUBJECTIVE PAIN PROGRESSION: GRADUALLY IMPROVING
PAIN SEVERITY GOAL: 0/10
HIGHEST PAIN SEVERITY IN PAST 24 HOURS: 7/10
PAIN: 1
PERSON REPORTING PAIN: PATIENT

## 2024-06-11 ENCOUNTER — PATIENT OUTREACH (OUTPATIENT)
Dept: PRIMARY CARE | Facility: CLINIC | Age: 74
End: 2024-06-11
Payer: COMMERCIAL

## 2024-06-11 NOTE — PROGRESS NOTES
"Call regarding appt. with PCP on (6/5/2024) after hospitalization.  At time of outreach call the patient feels as if their condition has (improved) since last visit. Discussed medications with patient during call. She reports she has started to take the Amiodarone as instructed every other day however she also states she has started to \"wean down\" on her Eliquis as well. Went through notes during call and did not note that Eliquis dosage/times had changed from discharge. Still see that patient is supposed to take 5mg BID and educated patient on the importance of taking anticoagulation appropriately to prevent blood clots. Patient verbalized understanding but states she is sure that she was supposed to take differently and states she will call cardiologist to confirm this as well.   Reviewed the PCP appointment with the pt and addressed any questions or concerns.   "

## 2024-06-13 ENCOUNTER — HOME CARE VISIT (OUTPATIENT)
Dept: HOME HEALTH SERVICES | Facility: HOME HEALTH | Age: 74
End: 2024-06-13
Payer: COMMERCIAL

## 2024-06-13 PROCEDURE — G0157 HHC PT ASSISTANT EA 15: HCPCS | Mod: CQ

## 2024-06-13 PROCEDURE — G0299 HHS/HOSPICE OF RN EA 15 MIN: HCPCS

## 2024-06-13 ASSESSMENT — ENCOUNTER SYMPTOMS
PAIN: 1
LOWEST PAIN SEVERITY IN PAST 24 HOURS: 0/10
SUBJECTIVE PAIN PROGRESSION: GRADUALLY IMPROVING
PERSON REPORTING PAIN: PATIENT
PAIN SEVERITY GOAL: 0/10
HIGHEST PAIN SEVERITY IN PAST 24 HOURS: 3/10

## 2024-06-14 ENCOUNTER — OFFICE VISIT (OUTPATIENT)
Dept: CARDIOLOGY | Facility: CLINIC | Age: 74
End: 2024-06-14
Payer: COMMERCIAL

## 2024-06-14 ENCOUNTER — ANCILLARY PROCEDURE (OUTPATIENT)
Dept: CARDIOLOGY | Facility: CLINIC | Age: 74
End: 2024-06-14
Payer: COMMERCIAL

## 2024-06-14 VITALS
OXYGEN SATURATION: 100 % | BODY MASS INDEX: 20.16 KG/M2 | HEART RATE: 91 BPM | DIASTOLIC BLOOD PRESSURE: 87 MMHG | RESPIRATION RATE: 18 BRPM | WEIGHT: 121 LBS | SYSTOLIC BLOOD PRESSURE: 143 MMHG | HEIGHT: 65 IN

## 2024-06-14 VITALS
OXYGEN SATURATION: 100 % | DIASTOLIC BLOOD PRESSURE: 60 MMHG | TEMPERATURE: 98.8 F | SYSTOLIC BLOOD PRESSURE: 101 MMHG | RESPIRATION RATE: 19 BRPM | HEART RATE: 84 BPM

## 2024-06-14 DIAGNOSIS — R53.1 GENERALIZED WEAKNESS: ICD-10-CM

## 2024-06-14 DIAGNOSIS — Z79.899 ON AMIODARONE THERAPY: ICD-10-CM

## 2024-06-14 DIAGNOSIS — I10 ESSENTIAL (PRIMARY) HYPERTENSION: ICD-10-CM

## 2024-06-14 DIAGNOSIS — I48.91 ATRIAL FIBRILLATION WITH RVR (MULTI): Primary | ICD-10-CM

## 2024-06-14 DIAGNOSIS — I48.91 ATRIAL FIBRILLATION WITH RVR (MULTI): ICD-10-CM

## 2024-06-14 DIAGNOSIS — I65.23 BILATERAL CAROTID ARTERY STENOSIS: ICD-10-CM

## 2024-06-14 DIAGNOSIS — I48.0 PAROXYSMAL ATRIAL FIBRILLATION (MULTI): ICD-10-CM

## 2024-06-14 DIAGNOSIS — R60.0 LEG EDEMA: ICD-10-CM

## 2024-06-14 DIAGNOSIS — E78.49 FAMILIAL COMBINED HYPERLIPIDEMIA: ICD-10-CM

## 2024-06-14 DIAGNOSIS — R79.89 ELEVATED TROPONIN: ICD-10-CM

## 2024-06-14 DIAGNOSIS — R06.02 SHORTNESS OF BREATH: ICD-10-CM

## 2024-06-14 DIAGNOSIS — I10 HYPERTENSION, UNSPECIFIED TYPE: ICD-10-CM

## 2024-06-14 LAB
ATRIAL RATE: 91 BPM
BODY SURFACE AREA: 1.59 M2
P AXIS: 65 DEGREES
P OFFSET: 208 MS
P ONSET: 152 MS
PR INTERVAL: 146 MS
Q ONSET: 225 MS
QRS COUNT: 15 BEATS
QRS DURATION: 62 MS
QT INTERVAL: 354 MS
QTC CALCULATION(BAZETT): 435 MS
QTC FREDERICIA: 407 MS
R AXIS: 68 DEGREES
T AXIS: 69 DEGREES
T OFFSET: 402 MS
VENTRICULAR RATE: 91 BPM

## 2024-06-14 PROCEDURE — 93247 EXT ECG>7D<15D SCAN A/R: CPT

## 2024-06-14 PROCEDURE — 1111F DSCHRG MED/CURRENT MED MERGE: CPT | Performed by: INTERNAL MEDICINE

## 2024-06-14 PROCEDURE — 99215 OFFICE O/P EST HI 40 MIN: CPT | Mod: 25 | Performed by: INTERNAL MEDICINE

## 2024-06-14 PROCEDURE — 99215 OFFICE O/P EST HI 40 MIN: CPT | Performed by: INTERNAL MEDICINE

## 2024-06-14 PROCEDURE — 3077F SYST BP >= 140 MM HG: CPT | Performed by: INTERNAL MEDICINE

## 2024-06-14 PROCEDURE — 3079F DIAST BP 80-89 MM HG: CPT | Performed by: INTERNAL MEDICINE

## 2024-06-14 PROCEDURE — 93005 ELECTROCARDIOGRAM TRACING: CPT | Mod: 59 | Performed by: INTERNAL MEDICINE

## 2024-06-14 PROCEDURE — 1157F ADVNC CARE PLAN IN RCRD: CPT | Performed by: INTERNAL MEDICINE

## 2024-06-14 PROCEDURE — 93010 ELECTROCARDIOGRAM REPORT: CPT | Performed by: INTERNAL MEDICINE

## 2024-06-14 RX ORDER — NADOLOL 20 MG/1
20 TABLET ORAL DAILY
Qty: 30 TABLET | Refills: 11 | Status: SHIPPED | OUTPATIENT
Start: 2024-06-14 | End: 2025-06-14

## 2024-06-14 RX ORDER — SPIRONOLACTONE 25 MG/1
25 TABLET ORAL DAILY
Qty: 30 TABLET | Refills: 5 | Status: SHIPPED | OUTPATIENT
Start: 2024-06-14 | End: 2024-12-11

## 2024-06-14 SDOH — ECONOMIC STABILITY: GENERAL

## 2024-06-14 SDOH — ECONOMIC STABILITY: FOOD INSECURITY: MEALS PER DAY: 3

## 2024-06-14 ASSESSMENT — ENCOUNTER SYMPTOMS
BOWEL INCONTINENCE: 1
PAIN: 1
PAIN SEVERITY GOAL: 0/10
STOOL FREQUENCY: LESS THAN DAILY
HIGHEST PAIN SEVERITY IN PAST 24 HOURS: 0/10
PAIN LOCATION: GENERALIZED
DEPRESSION: 0
MUSCLE WEAKNESS: 1
CHANGE IN APPETITE: UNCHANGED
LAST BOWEL MOVEMENT: 67003
PERSON REPORTING PAIN: PATIENT
SUBJECTIVE PAIN PROGRESSION: UNCHANGED
LOWEST PAIN SEVERITY IN PAST 24 HOURS: 0/10
PAIN LOCATION - PAIN QUALITY: ACHY
LOSS OF SENSATION IN FEET: 1
PAIN LOCATION - PAIN SEVERITY: 1/10
PAIN LOCATION - PAIN FREQUENCY: INTERMITTENT
APPETITE LEVEL: FAIR

## 2024-06-14 ASSESSMENT — ACTIVITIES OF DAILY LIVING (ADL)
CURRENT_FUNCTION: STAND BY ASSIST
MONEY MANAGEMENT (EXPENSES/BILLS): INDEPENDENT
AMBULATION ASSISTANCE: STAND BY ASSIST

## 2024-06-14 NOTE — PATIENT INSTRUCTIONS
"It was a pleasure seeing you today. I would like to see you back in clinic in 6-8 weeks . You can call my office if questions arise between now and our next visit.     Today, we talked about your atrial fibrillation   -- Please continue the Amiodarone  at this time. When your current supply ends, we will be done with this therapy  -- Please continue to use Apixaban ( Eliquis ) 5 mg Twice Daily to prevent strokes  -- Please begin a medication call Nadolol. This will help with blood pressure and heart rates. We will start at a low dose of 20 mg Daily   -- Please continue Spironolactone 25 mg to help your blood pressure and your liver function   -- Please schedule a Carotid Study to  re-look at blockages in your neck arteries that were seen in 2020   -- Please have blood work done before our next appointment.           Below are some Heart Health Tips that we provide to all of our patients. I hope you find them useful.     - If you are having problems with medications, consider looking at the following websites.   --  \"[employer]\"   --  \"[facility]\"      - We are happy to supply written prescriptions if needed to allow you to obtain your medications from different pharmacies. Additionally, if you are having issues with mail order delivery, please let us know. We can send a limited supply of your medications to your local pharmacy.     -  We recommend you follow a heart healthy diet. Watch food labels and try not to eat more than 2,500 mg of sodium per day. Avoid foods high in salt like processed meats (lunch meats, taylor, and sausage), processed foods (boxed dinners, canned soups), fried and fast foods. Monitor serving sizes and if the sodium per serving size is more than 200 mg, avoid those foods. If the sodium per serving size is between 100-200 mg, you can use those in limited quantities. Try to choose foods where the amount of sodium per serving size is less than 100 mg. Try to eat a diet rich in " fruits and vegetables, whole grains, low fat dairy products, skinless poultry and fish, nuts, beans, non-tropical vegetable oils. Limit saturated fat, trans fat, sodium, red meats, and sugar-sweetened beverages.   Limit alcohol     -The combination of a reduced-calorie diet and increased physical activity is recommended. Adults should aim to get at least 150 minutes of moderate physical activity per week (30 minutes of moderate physical activities at least 5 days per week). Examples of moderate physical activities include brisk walking, swimming, aerobic dancing, heavy gardening, jumping rope, bicycling 10 MPH or faster, tennis, hiking uphill or with a heavy backpack. Please let us know if you would like to learn more about your nutrition and calories and additional options including weight loss programs to help you reach your goal.     -If you smoke, stop smoking. If you stop smoking you can help get rid of a major source of stress to your heart. Smoking makes your heart rate and blood pressure go up and increases your risk or developing cardiovascular diseases and worsen symptoms associated with heart failure.     -Obtain a BP monitor and monitor your BP daily. Check it around the same time each day; at least 1 hour after taking your medications. Record your BP in a log and bring your log with you to your doctors appointment.     -F/u with your PCP as recommended.

## 2024-06-14 NOTE — PROGRESS NOTES
Referred by FAITH Aviles for New Patient Visit and Atrial Fibrillation     HPI:    Svitlana Perry is a 74 y.o. female with pertinent history of COPD, hypertension, hyperlipidemia, alcohol and tobacco use disorder, pancreatitis, PVD, recent hospitalization for generalized fatigue and hypomagnesemia/hypokalemia  presents to cardiology clinic to establish care.     Recall, she was admitted to [facility] 20 4-23 through 5/27/2024 and repeat admission 5/29 through 531.  She initially presented with failure to thrive, confusion and inability to ambulate.  In the emergency department she was hypoxic, tachycardic and very confused.  She was in atrial fibrillation with rates to the 160s to 170s.  She was urgently cardioverted in the ED and started on heparin as well as IV amiodarone drip.  She was then transferred to stepdown unit where she was continued on amiodarone and ultimately switched to oral amiodarone plus oral Eliquis.  She was noted to have urinary tract infection was treated with 7-day duration of oral ciprofloxacin.  She was subsequently discharged.    Since she has returned home, she has overall done well.  She feels much more alert, more energetic.  She has not missed doses of her anticoagulation.  She has not felt heart palpitations.      No exacerbating or relieving factors.  Patient denies chest pain and angina.  Pt denies orthopnea, and paroxysmal nocturnal dyspnea.  Pt denies worsening lower extremity edema.  Pt denies palpitations or syncope.  No recent falls.  No fever or chills.  No cough.  No change in bowel or bladder habits.  No sick contacts.  No recent travel.    12 point review of systems including (Constitutional, Eyes, ENMT, Respiratory, Cardiac, Gastrointestinal, Neurological, Psychiatric, and Hematologic) was performed and is otherwise negative.    Past medical history reviewed:   has no past medical history on file.    Past surgical history reviewed:   has a past surgical history that includes  "Other surgical history (02/15/2016); MR angio neck wo IV contrast (08/19/2019); MR angio head wo IV contrast (08/19/2019); MR angio neck wo IV contrast (11/30/2022); and Appendectomy.    Social history reviewed:   reports that she has been smoking cigarettes. She has never used smokeless tobacco. She reports that she does not currently use alcohol. She reports that she does not use drugs.     Family history reviewed:  No family history on file.    Allergies reviewed: Patient has no known allergies.     Medications reviewed:   Current Outpatient Medications   Medication Instructions    albuterol 90 mcg/actuation inhaler 2 puffs, inhalation, Every 4 hours PRN    amiodarone (Pacerone) 200 mg tablet Take 2 tablets by mouth two times a day for 8 days, then starting June 10th, 2024 take 1 tablet (200 mg) by mouth every other day.    amiodarone (PACERONE) 400 mg, oral, 2 times daily    Eliquis 5 mg, oral, 2 times daily    fluticasone-umeclidin-vilanter (Trelegy Ellipta) 100-62.5-25 mcg blister with device 1 puff, inhalation, Daily    folic acid (FOLVITE) 1 mg, oral, Daily    ipratropium-albuteroL (Duo-Neb) 0.5-2.5 mg/3 mL nebulizer solution USE 1 UNIT DOSE VIA NEBULIZER THREE TIMES DAILY.    magnesium oxide 400 mg, oral, Nightly    potassium chloride CR (Klor-Con) 10 mEq ER tablet 10 mEq, oral, Daily, Do not crush, chew, or split.    spironolactone (ALDACTONE) 25 mg, oral, Daily    thiamine (VITAMIN B-1) 100 mg, oral, Daily        Vitals reviewed: Visit Vitals  /87 (BP Location: Left arm, Patient Position: Sitting, BP Cuff Size: Adult)   Pulse 91   Resp 18       Physical Exam:   /87 (BP Location: Left arm, Patient Position: Sitting, BP Cuff Size: Adult)   Pulse 91   Resp 18   Ht 1.651 m (5' 5\")   Wt 54.9 kg (121 lb)   SpO2 100%   BMI 20.14 kg/m²   General:  Patient is awake, alert, and oriented.  Patient is in no acute distress.  HEENT:  Pupils equal and reactive.  Normocephalic.  Moist mucosa.    Neck:  " No thyromegaly.  Normal Jugular Venous Pressure.  Cardiovascular:  Regular rate and rhythm.  Normal S1 and S2.  1/6 RAFAEL.  Pulmonary:  Clear to auscultation bilaterally.  Abdomen:  Soft. Non-tender.   Non-distended.  Positive bowel sounds.  Lower Extremities:  2+ pedal pulses. No LE edema.  Neurologic:  Cranial nerves intact.  No focal deficit.   Skin: Skin warm and dry, normal skin turgor.   Psychiatric: Normal affect.    Last Labs:  CBC -      Lab Results   Component Value Date    WBC 8.5 05/31/2024    HGB 8.3 (L) 05/31/2024    HCT 23.8 (L) 05/31/2024     05/31/2024        CMP-  Lab Results   Component Value Date    GLUCOSE 97 06/01/2024     (L) 06/01/2024    K 4.4 06/01/2024     06/01/2024    CO2 25 06/01/2024    ANIONGAP 9 (L) 06/01/2024    BUN 6 06/01/2024    CREATININE 0.49 (L) 06/01/2024    EGFR >90 06/01/2024    CALCIUM 7.8 (L) 06/01/2024    PHOS 3.1 06/01/2024    PROT 6.0 (L) 05/29/2024    ALBUMIN 2.6 (L) 06/01/2024    AST 40 (H) 05/29/2024    ALT 35 05/29/2024    ALKPHOS 269 (H) 05/29/2024    BILITOT 2.0 (H) 05/29/2024        LIPIDS-  Lab Results   Component Value Date    CHOL 224 (H) 02/16/2023    TRIG 82 02/16/2023    HDL 55.5 02/16/2023    CHHDL 4.0 02/16/2023    VLDL 16 02/16/2023        OTHERS-  Lab Results   Component Value Date    HGBA1C 4.1 05/24/2024     (H) 05/23/2024        I personally reviewed the patient's recent vitals, labs, medications, orders, EKGs, pertinent cardiac imaging/ echocardiography and ischemic evaluations including stress testing/ cardiac catheterization.    Assessment and Plan:  Problem List Items Addressed This Visit       Atrial fibrillation with RVR (Multi) - Primary    Overview     Paroxysmal Atrial Fibrillation noted   CHADS2-VASC = 3   --          Current Assessment & Plan     6/14/2024   -- In Office EKG shows Sinus Rhythm   -- Will continue Current supply of Amiodarone 200 mg Every Other Day until this Script is out. We will stop this therapy  at that time   -- Will continue Apixaban 5 mg BID         Relevant Medications    nadolol (Corgard) 20 mg tablet    Other Relevant Orders    ECG 12 lead (Clinic Performed) (Completed)    Renal function panel    CBC and Auto Differential    Hepatic function panel    Holter Or Event Cardiac Monitor    Carotid stenosis    Overview     Carotid MRI -- 11/2022  Occlusion of the right vertebral artery origin with partial  reconstitution beginning at the distal right V2 segment.     Estimated 70% stenosis of the proximal left internal carotid artery  and 50% stenosis of the proximal right internal carotid artery by  NASCET criteria.         Relevant Orders    Vascular US carotid artery duplex bilateral    Elevated troponin    Essential (primary) hypertension    Familial combined hyperlipidemia    Generalized weakness    Relevant Medications    apixaban (Eliquis) 5 mg tablet    Other Relevant Orders    ECG 12 lead (Clinic Performed) (Completed)    B-Type Natriuretic Peptide    HTN (hypertension)    Relevant Medications    spironolactone (Aldactone) 25 mg tablet    Other Relevant Orders    ECG 12 lead (Clinic Performed) (Completed)    Renal function panel    CBC and Auto Differential    Hepatic function panel    B-Type Natriuretic Peptide    Leg edema    Relevant Orders    B-Type Natriuretic Peptide     Other Visit Diagnoses       Paroxysmal atrial fibrillation (Multi)        Relevant Medications    apixaban (Eliquis) 5 mg tablet    nadolol (Corgard) 20 mg tablet    Other Relevant Orders    ECG 12 lead (Clinic Performed) (Completed)    Hepatic function panel    Holter Or Event Cardiac Monitor    B-Type Natriuretic Peptide    Protime-INR    On amiodarone therapy        Relevant Orders    Hepatic function panel    Holter Or Event Cardiac Monitor    Shortness of breath        Relevant Orders    B-Type Natriuretic Peptide              Please followup with me in Cardiology clinic within the next 6-8 weeks.  Please return to clinic  sooner or seek emergent care if your symptoms reoccur or worsen.    Thank you for allowing me to participate in their care.  Please feel free to call me with any further questions or concerns.        Mike Laura DO   Division of Cardiovascular Medicine  [facility], [facility]

## 2024-06-14 NOTE — ASSESSMENT & PLAN NOTE
6/14/2024   -- In Office EKG shows Sinus Rhythm   -- Will continue Current supply of Amiodarone 200 mg Every Other Day until this Script is out. We will stop this therapy at that time   -- Will continue Apixaban 5 mg BID

## 2024-06-18 ENCOUNTER — HOME CARE VISIT (OUTPATIENT)
Dept: HOME HEALTH SERVICES | Facility: HOME HEALTH | Age: 74
End: 2024-06-18
Payer: COMMERCIAL

## 2024-06-18 LAB
ATRIAL RATE: 91 BPM
P AXIS: 65 DEGREES
P OFFSET: 208 MS
P ONSET: 152 MS
PR INTERVAL: 146 MS
Q ONSET: 225 MS
QRS COUNT: 15 BEATS
QRS DURATION: 62 MS
QT INTERVAL: 354 MS
QTC CALCULATION(BAZETT): 435 MS
QTC FREDERICIA: 407 MS
R AXIS: 68 DEGREES
T AXIS: 69 DEGREES
T OFFSET: 402 MS
VENTRICULAR RATE: 91 BPM

## 2024-06-18 PROCEDURE — G0157 HHC PT ASSISTANT EA 15: HCPCS | Mod: CQ

## 2024-06-18 ASSESSMENT — ENCOUNTER SYMPTOMS
PAIN: 1
PERSON REPORTING PAIN: PATIENT

## 2024-06-20 ENCOUNTER — HOME CARE VISIT (OUTPATIENT)
Dept: HOME HEALTH SERVICES | Facility: HOME HEALTH | Age: 74
End: 2024-06-20
Payer: COMMERCIAL

## 2024-06-20 PROCEDURE — G0157 HHC PT ASSISTANT EA 15: HCPCS | Mod: CQ

## 2024-06-20 SDOH — HEALTH STABILITY: PHYSICAL HEALTH: EXERCISE COMMENTS: PT HAS PAINFUL NEUROPATHY AND SOMETIMES NEEDES TO SIT DOWN.

## 2024-06-20 ASSESSMENT — ENCOUNTER SYMPTOMS
LOWEST PAIN SEVERITY IN PAST 24 HOURS: 0/10
PAIN SEVERITY GOAL: 0/10
PAIN: 1
HIGHEST PAIN SEVERITY IN PAST 24 HOURS: 5/10
PERSON REPORTING PAIN: PATIENT

## 2024-06-21 ENCOUNTER — HOME CARE VISIT (OUTPATIENT)
Dept: HOME HEALTH SERVICES | Facility: HOME HEALTH | Age: 74
End: 2024-06-21
Payer: COMMERCIAL

## 2024-06-21 VITALS
SYSTOLIC BLOOD PRESSURE: 100 MMHG | HEART RATE: 76 BPM | DIASTOLIC BLOOD PRESSURE: 60 MMHG | TEMPERATURE: 98.1 F | OXYGEN SATURATION: 96 % | RESPIRATION RATE: 19 BRPM

## 2024-06-21 PROCEDURE — G0299 HHS/HOSPICE OF RN EA 15 MIN: HCPCS

## 2024-06-23 SDOH — ECONOMIC STABILITY: GENERAL

## 2024-06-23 SDOH — ECONOMIC STABILITY: FOOD INSECURITY: MEALS PER DAY: 2

## 2024-06-23 ASSESSMENT — ENCOUNTER SYMPTOMS
HIGHEST PAIN SEVERITY IN PAST 24 HOURS: 1/10
APPETITE LEVEL: FAIR
PAIN LOCATION - PAIN SEVERITY: 1/10
PAIN: 1
CHANGE IN APPETITE: UNCHANGED
PERSON REPORTING PAIN: PATIENT
PAIN LOCATION: GENERALIZED
SUBJECTIVE PAIN PROGRESSION: UNCHANGED
PAIN LOCATION - PAIN QUALITY: ACHY
PAIN LOCATION - PAIN FREQUENCY: INTERMITTENT
LOWEST PAIN SEVERITY IN PAST 24 HOURS: 0/10
PAIN SEVERITY GOAL: 0/10

## 2024-06-23 ASSESSMENT — ACTIVITIES OF DAILY LIVING (ADL)
CURRENT_FUNCTION: STAND BY ASSIST
AMBULATION ASSISTANCE: STAND BY ASSIST
MONEY MANAGEMENT (EXPENSES/BILLS): INDEPENDENT

## 2024-06-26 ENCOUNTER — HOME CARE VISIT (OUTPATIENT)
Dept: HOME HEALTH SERVICES | Facility: HOME HEALTH | Age: 74
End: 2024-06-26
Payer: COMMERCIAL

## 2024-06-26 PROCEDURE — G0299 HHS/HOSPICE OF RN EA 15 MIN: HCPCS

## 2024-06-26 SDOH — ECONOMIC STABILITY: GENERAL

## 2024-06-26 SDOH — ECONOMIC STABILITY: FOOD INSECURITY: MEALS PER DAY: 3

## 2024-06-26 ASSESSMENT — ENCOUNTER SYMPTOMS
PAIN: 1
LAST BOWEL MOVEMENT: 67016
PERSON REPORTING PAIN: PATIENT
APPETITE LEVEL: GOOD
CHANGE IN APPETITE: UNCHANGED

## 2024-06-26 ASSESSMENT — ACTIVITIES OF DAILY LIVING (ADL): MONEY MANAGEMENT (EXPENSES/BILLS): INDEPENDENT

## 2024-06-28 VITALS
HEART RATE: 76 BPM | RESPIRATION RATE: 20 BRPM | SYSTOLIC BLOOD PRESSURE: 110 MMHG | DIASTOLIC BLOOD PRESSURE: 60 MMHG | OXYGEN SATURATION: 100 % | TEMPERATURE: 98.7 F

## 2024-06-28 ASSESSMENT — ACTIVITIES OF DAILY LIVING (ADL)
HOME_HEALTH_OASIS: 00
OASIS_M1830: 00

## 2024-06-28 ASSESSMENT — ENCOUNTER SYMPTOMS
PAIN SEVERITY GOAL: 0/10
SUBJECTIVE PAIN PROGRESSION: UNCHANGED
PAIN LOCATION - PAIN QUALITY: ACHY
LOWEST PAIN SEVERITY IN PAST 24 HOURS: 0/10
PAIN LOCATION - PAIN FREQUENCY: INTERMITTENT
PAIN LOCATION - PAIN SEVERITY: 1/10
PAIN LOCATION: GENERALIZED
HIGHEST PAIN SEVERITY IN PAST 24 HOURS: 0/10

## 2024-07-02 ENCOUNTER — HOSPITAL ENCOUNTER (OUTPATIENT)
Dept: VASCULAR MEDICINE | Facility: HOSPITAL | Age: 74
Discharge: HOME | End: 2024-07-02
Payer: COMMERCIAL

## 2024-07-02 DIAGNOSIS — I65.23 BILATERAL CAROTID ARTERY STENOSIS: ICD-10-CM

## 2024-07-02 PROCEDURE — 93880 EXTRACRANIAL BILAT STUDY: CPT

## 2024-07-02 PROCEDURE — 93880 EXTRACRANIAL BILAT STUDY: CPT | Performed by: SURGERY

## 2024-07-12 ENCOUNTER — APPOINTMENT (OUTPATIENT)
Dept: PRIMARY CARE | Facility: CLINIC | Age: 74
End: 2024-07-12
Payer: COMMERCIAL

## 2024-07-12 DIAGNOSIS — D50.9 IRON DEFICIENCY ANEMIA, UNSPECIFIED IRON DEFICIENCY ANEMIA TYPE: ICD-10-CM

## 2024-07-12 DIAGNOSIS — E03.9 HYPOTHYROIDISM, UNSPECIFIED TYPE: ICD-10-CM

## 2024-07-12 DIAGNOSIS — I10 BENIGN ESSENTIAL HYPERTENSION: ICD-10-CM

## 2024-07-12 DIAGNOSIS — E78.5 HYPERLIPIDEMIA, UNSPECIFIED HYPERLIPIDEMIA TYPE: ICD-10-CM

## 2024-07-12 DIAGNOSIS — E78.00 ELEVATED LDL CHOLESTEROL LEVEL: ICD-10-CM

## 2024-07-12 DIAGNOSIS — I10 PRIMARY HYPERTENSION: ICD-10-CM

## 2024-07-12 PROCEDURE — 80053 COMPREHEN METABOLIC PANEL: CPT

## 2024-07-12 PROCEDURE — 36415 COLL VENOUS BLD VENIPUNCTURE: CPT

## 2024-07-12 PROCEDURE — 85027 COMPLETE CBC AUTOMATED: CPT

## 2024-07-12 PROCEDURE — 80061 LIPID PANEL: CPT

## 2024-07-13 LAB
ALBUMIN SERPL BCP-MCNC: 4.4 G/DL (ref 3.4–5)
ALP SERPL-CCNC: 86 U/L (ref 33–136)
ALT SERPL W P-5'-P-CCNC: 13 U/L (ref 7–45)
ANION GAP SERPL CALC-SCNC: 15 MMOL/L (ref 10–20)
AST SERPL W P-5'-P-CCNC: 16 U/L (ref 9–39)
BILIRUB SERPL-MCNC: 0.5 MG/DL (ref 0–1.2)
BUN SERPL-MCNC: 15 MG/DL (ref 6–23)
CALCIUM SERPL-MCNC: 9.9 MG/DL (ref 8.6–10.6)
CHLORIDE SERPL-SCNC: 104 MMOL/L (ref 98–107)
CHOLEST SERPL-MCNC: 252 MG/DL (ref 0–199)
CHOLESTEROL/HDL RATIO: 5.9
CO2 SERPL-SCNC: 25 MMOL/L (ref 21–32)
CREAT SERPL-MCNC: 0.78 MG/DL (ref 0.5–1.05)
EGFRCR SERPLBLD CKD-EPI 2021: 81 ML/MIN/1.73M*2
ERYTHROCYTE [DISTWIDTH] IN BLOOD BY AUTOMATED COUNT: 14 % (ref 11.5–14.5)
GLUCOSE SERPL-MCNC: 83 MG/DL (ref 74–99)
HCT VFR BLD AUTO: 41.4 % (ref 36–46)
HDLC SERPL-MCNC: 42.6 MG/DL
HGB BLD-MCNC: 13 G/DL (ref 12–16)
LDLC SERPL CALC-MCNC: 183 MG/DL
MCH RBC QN AUTO: 33 PG (ref 26–34)
MCHC RBC AUTO-ENTMCNC: 31.4 G/DL (ref 32–36)
MCV RBC AUTO: 105 FL (ref 80–100)
NON HDL CHOLESTEROL: 209 MG/DL (ref 0–149)
NRBC BLD-RTO: 0 /100 WBCS (ref 0–0)
PLATELET # BLD AUTO: 334 X10*3/UL (ref 150–450)
POTASSIUM SERPL-SCNC: 5.3 MMOL/L (ref 3.5–5.3)
PROT SERPL-MCNC: 7.1 G/DL (ref 6.4–8.2)
RBC # BLD AUTO: 3.94 X10*6/UL (ref 4–5.2)
SODIUM SERPL-SCNC: 139 MMOL/L (ref 136–145)
TRIGL SERPL-MCNC: 132 MG/DL (ref 0–149)
VLDL: 26 MG/DL (ref 0–40)
WBC # BLD AUTO: 8.8 X10*3/UL (ref 4.4–11.3)

## 2024-07-15 ENCOUNTER — APPOINTMENT (OUTPATIENT)
Dept: PRIMARY CARE | Facility: CLINIC | Age: 74
End: 2024-07-15
Payer: COMMERCIAL

## 2024-07-15 VITALS
SYSTOLIC BLOOD PRESSURE: 110 MMHG | HEART RATE: 71 BPM | DIASTOLIC BLOOD PRESSURE: 67 MMHG | OXYGEN SATURATION: 95 % | RESPIRATION RATE: 12 BRPM | BODY MASS INDEX: 20.14 KG/M2 | WEIGHT: 121 LBS

## 2024-07-15 DIAGNOSIS — N32.81 OVERACTIVE BLADDER: ICD-10-CM

## 2024-07-15 DIAGNOSIS — N39.0 URINARY TRACT INFECTION WITHOUT HEMATURIA, SITE UNSPECIFIED: ICD-10-CM

## 2024-07-15 DIAGNOSIS — H91.90 HEARING LOSS, UNSPECIFIED HEARING LOSS TYPE, UNSPECIFIED LATERALITY: ICD-10-CM

## 2024-07-15 DIAGNOSIS — J45.909 ASTHMA, UNSPECIFIED ASTHMA SEVERITY, UNSPECIFIED WHETHER COMPLICATED, UNSPECIFIED WHETHER PERSISTENT (HHS-HCC): ICD-10-CM

## 2024-07-15 DIAGNOSIS — E78.2 MODERATE MIXED HYPERLIPIDEMIA NOT REQUIRING STATIN THERAPY: ICD-10-CM

## 2024-07-15 DIAGNOSIS — R35.0 FREQUENCY OF URINATION: Primary | ICD-10-CM

## 2024-07-15 LAB
POC APPEARANCE, URINE: CLEAR
POC BILIRUBIN, URINE: NEGATIVE
POC BLOOD, URINE: NEGATIVE
POC COLOR, URINE: YELLOW
POC GLUCOSE, URINE: NEGATIVE MG/DL
POC KETONES, URINE: NEGATIVE MG/DL
POC LEUKOCYTES, URINE: NEGATIVE
POC NITRITE,URINE: NEGATIVE
POC PH, URINE: 5 PH
POC PROTEIN, URINE: NEGATIVE MG/DL
POC SPECIFIC GRAVITY, URINE: 1.02
POC UROBILINOGEN, URINE: 0.2 EU/DL

## 2024-07-15 PROCEDURE — 1159F MED LIST DOCD IN RCRD: CPT | Performed by: INTERNAL MEDICINE

## 2024-07-15 PROCEDURE — 3074F SYST BP LT 130 MM HG: CPT | Performed by: INTERNAL MEDICINE

## 2024-07-15 PROCEDURE — 99214 OFFICE O/P EST MOD 30 MIN: CPT | Performed by: INTERNAL MEDICINE

## 2024-07-15 PROCEDURE — 3078F DIAST BP <80 MM HG: CPT | Performed by: INTERNAL MEDICINE

## 2024-07-15 PROCEDURE — 1157F ADVNC CARE PLAN IN RCRD: CPT | Performed by: INTERNAL MEDICINE

## 2024-07-15 PROCEDURE — 81002 URINALYSIS NONAUTO W/O SCOPE: CPT | Performed by: INTERNAL MEDICINE

## 2024-07-15 RX ORDER — FLUTICASONE FUROATE AND VILANTEROL 100; 25 UG/1; UG/1
1 POWDER RESPIRATORY (INHALATION) DAILY
Qty: 60 EACH | Refills: 3 | Status: SHIPPED | OUTPATIENT
Start: 2024-07-15

## 2024-07-15 RX ORDER — FLAXSEED OIL 1000 MG
1 CAPSULE ORAL DAILY
Qty: 90 CAPSULE | Refills: 3 | Status: SHIPPED | OUTPATIENT
Start: 2024-07-15

## 2024-07-15 RX ORDER — SOLIFENACIN SUCCINATE 5 MG/1
5 TABLET, FILM COATED ORAL DAILY
Qty: 30 TABLET | Refills: 11 | Status: SHIPPED | OUTPATIENT
Start: 2024-07-15 | End: 2025-07-15

## 2024-07-15 RX ORDER — LANOLIN ALCOHOL/MO/W.PET/CERES
100 CREAM (GRAM) TOPICAL DAILY
Qty: 30 TABLET | Refills: 11 | Status: SHIPPED | OUTPATIENT
Start: 2024-07-15 | End: 2025-07-15

## 2024-07-15 NOTE — PROGRESS NOTES
Subjective   Chief complaint: Svitlana Perry is a 74 y.o. female who presents for Follow-up ( patient being seen for follow-up for blood work.  ).    HPI:  Pt presents for blood-work review.     C/o UTI symptoms - increased frequency. Loss of control, increased frequency when standing up. Denies burning or blood. Denies fever/ chills.    C/o hearing loss.         Objective   /67   Pulse 71   Resp 12   Wt 54.9 kg (121 lb)   SpO2 95%   BMI 20.14 kg/m²   Physical Exam  Constitutional:       Appearance: Normal appearance.   HENT:      Head: Normocephalic and atraumatic.   Eyes:      Extraocular Movements: Extraocular movements intact.   Cardiovascular:      Rate and Rhythm: Normal rate and regular rhythm.      Heart sounds: Normal heart sounds.   Pulmonary:      Effort: Pulmonary effort is normal.      Breath sounds: Normal breath sounds.   Skin:     General: Skin is dry.   Neurological:      General: No focal deficit present.      Mental Status: She is alert.   Psychiatric:         Mood and Affect: Mood normal.         Behavior: Behavior normal.         Thought Content: Thought content normal.         Judgment: Judgment normal.         I have reviewed and reconciled the medication list with the patient today.   Current Outpatient Medications:     albuterol 90 mcg/actuation inhaler, Inhale 2 puffs every 4 hours if needed., Disp: , Rfl:     amiodarone (Pacerone) 200 mg tablet, Take 2 tablets by mouth two times a day for 8 days, then starting June 10th, 2024 take 1 tablet (200 mg) by mouth every other day., Disp: 47 tablet, Rfl: 0    apixaban (Eliquis) 5 mg tablet, Take 1 tablet (5 mg) by mouth 2 times a day., Disp: 60 tablet, Rfl: 11    folic acid (Folvite) 1 mg tablet, Take 1 tablet (1 mg) by mouth once daily., Disp: 30 tablet, Rfl: 11    ipratropium-albuteroL (Duo-Neb) 0.5-2.5 mg/3 mL nebulizer solution, USE 1 UNIT DOSE VIA NEBULIZER THREE TIMES DAILY., Disp: 270 mL, Rfl: 1    magnesium oxide 400 mg  magnesium capsule, Take 1 capsule (400 mg) by mouth once daily at bedtime., Disp: 90 capsule, Rfl: 3    nadolol (Corgard) 20 mg tablet, Take 1 tablet (20 mg) by mouth once daily., Disp: 30 tablet, Rfl: 11    potassium chloride CR (Klor-Con) 10 mEq ER tablet, Take 1 tablet (10 mEq) by mouth once daily. Do not crush, chew, or split., Disp: 30 tablet, Rfl: 11    spironolactone (Aldactone) 25 mg tablet, Take 1 tablet (25 mg) by mouth once daily., Disp: 30 tablet, Rfl: 5    Current Facility-Administered Medications:     fluticasone-umeclidin-vilanter (TRELEGY-ELLIPTA) 100-62.5-25 mcg 100-62.5-25 mcg/puff inhaler 1 puff, 1 puff, inhalation, BID, Demarco Gonzalez MD     Imaging:  Vascular US carotid artery duplex bilateral    Result Date: 7/4/2024             [place] [id number]   Tel [phone] and Fax [phone]  Vascular Lab Report [facility] US CAROTID ARTERY DUPLEX BILATERAL  Patient Name:     JARRED Gipson Physician: [id number] Gisela Loaiza MD Study Date:       7/2/2024            Ordering           [id number] DIPTI CARRERA                                       Physician: MRN/PID:          [id number]            Technologist:      Criss Godoy T Accession#:       [id number]        Technologist 2: Date of           1950 / 74      Encounter#:        [id number] Birth/Age:        years Gender:           F Admission Status: Outpatient          Location           [facility]                                       Performed:  Diagnosis/ICD: Occlusion and stenosis of bilateral carotid arteries-I65.23 CPT Codes:     48615 Cerebrovascular Carotid Duplex scan complete  CONCLUSIONS: Right Carotid: Findings are consistent with 50 to 69% stenosis of the right proximal internal carotid artery. Turbulent flow seen by color Doppler. Right external carotid artery appears patent with no evidence of stenosis. No  evidence of hemodynamically significant stenosis of the right common carotid artery. The right vertebral artery is patent with antegrade flow. No evidence of hemodynamically significant stenosis in the right subclavian artery. Subclavian artery noted with bidirectional flow. Left Carotid: Findings are consistent with greater than 70% stenosis of the left proximal internal carotid artery. Turbulent flow seen by color Doppler. Left external carotid artery appears patent with no evidence of stenosis. No evidence of hemodynamically significant stenosis of the left common carotid artery. The left vertebral artery is patent with antegrade flow. There are elevated velocities in the left subclavian artery that are suggestive of disease.  Comparison: Compared with study from 9/15/2020, On today's study, elevated velocities were noted in the left subclavian artery. Known > 70% stenosis of left ICA.  Imaging & Doppler Findings: Right Plaque Morph: The proximal right internal carotid artery demonstrates heterogenous plaque. The proximal right external carotid artery demonstrates heterogenous plaque. The proximal right common carotid artery demonstrates heterogenous plaque. The distal right common carotid artery demonstrates heterogenous plaque. The proximal right subclavian artery demonstrates heterogenous plaque. Left Plaque Morph: The proximal left internal carotid artery demonstrates heterogenous plaque. The proximal left external carotid artery demonstrates heterogenous plaque. The distal left common carotid artery demonstrates heterogenous plaque. The proximal left subclavian artery demonstrates heterogenous plaque.   Right                        Left   PSV      EDV                PSV      EDV 65 cm/s  13 cm/s   CCA P    69 cm/s  15 cm/s 62 cm/s  13 cm/s   CCA D    53 cm/s  15 cm/s 169 cm/s 41 cm/s   ICA P    354 cm/s 90 cm/s 102 cm/s 29 cm/s   ICA M    98 cm/s  21 cm/s 79 cm/s  25 cm/s   ICA D    57 cm/s  20 cm/s 95 cm/s   13 cm/s    ECA     45 cm/s  0 cm/s 60 cm/s  9 cm/s  Vertebral  42 cm/s  13 cm/s 165 cm/s 0 cm/s  Subclavian 305 cm/s 0 cm/s               Right Left ICA/CCA Ratio  2.7  6.7   [id number] Gisela Loaiza MD Electronically signed by [id number] Gisela Loaiza MD on 7/4/2024 at 12:43:46 PM  ** Final **        Labs reviewed:    Lab Results   Component Value Date    WBC 8.8 07/12/2024    HGB 13.0 07/12/2024    HCT 41.4 07/12/2024     07/12/2024    CHOL 252 (H) 07/12/2024    TRIG 132 07/12/2024    HDL 42.6 07/12/2024    ALT 13 07/12/2024    AST 16 07/12/2024     07/12/2024    K 5.3 07/12/2024     07/12/2024    CREATININE 0.78 07/12/2024    BUN 15 07/12/2024    CO2 25 07/12/2024    TSH 3.38 05/24/2024    INR 1.2 (H) 05/29/2024    HGBA1C 4.1 05/24/2024       Assessment/Plan   Problem List Items Addressed This Visit       Asthma ([facility])     Discharge trilogy     Start breo-ellipta          Hyperlipidemia     Omega 3 flaxseed         UTI (urinary tract infection)    Relevant Orders    POCT UA (nonautomated) manually resulted (Completed)    Frequency of urination - Primary    Hearing loss     Refer to audiometry         Overactive bladder     Vasicare 5mg every night             Continue current medications as listed  Follow up as regularly planned.

## 2024-07-23 ENCOUNTER — PATIENT OUTREACH (OUTPATIENT)
Dept: PRIMARY CARE | Facility: CLINIC | Age: 74
End: 2024-07-23
Payer: COMMERCIAL

## 2024-07-30 DIAGNOSIS — J45.909 ASTHMA, UNSPECIFIED ASTHMA SEVERITY, UNSPECIFIED WHETHER COMPLICATED, UNSPECIFIED WHETHER PERSISTENT (HHS-HCC): ICD-10-CM

## 2024-07-30 RX ORDER — FLUTICASONE FUROATE AND VILANTEROL 100; 25 UG/1; UG/1
1 POWDER RESPIRATORY (INHALATION) DAILY
Qty: 60 EACH | Refills: 3 | Status: SHIPPED | OUTPATIENT
Start: 2024-07-30

## 2024-08-06 ENCOUNTER — APPOINTMENT (OUTPATIENT)
Dept: AUDIOLOGY | Facility: CLINIC | Age: 74
End: 2024-08-06
Payer: COMMERCIAL

## 2024-08-13 ENCOUNTER — APPOINTMENT (OUTPATIENT)
Dept: GASTROENTEROLOGY | Facility: CLINIC | Age: 74
End: 2024-08-13
Payer: COMMERCIAL

## 2024-08-28 ENCOUNTER — PATIENT OUTREACH (OUTPATIENT)
Dept: PRIMARY CARE | Facility: CLINIC | Age: 74
End: 2024-08-28
Payer: COMMERCIAL

## 2024-10-08 DIAGNOSIS — I48.0 PAROXYSMAL ATRIAL FIBRILLATION (MULTI): ICD-10-CM

## 2024-10-08 DIAGNOSIS — R53.1 GENERALIZED WEAKNESS: ICD-10-CM

## 2024-10-08 DIAGNOSIS — I10 HYPERTENSION, UNSPECIFIED TYPE: ICD-10-CM

## 2024-10-15 RX ORDER — NADOLOL 20 MG/1
20 TABLET ORAL DAILY
Qty: 90 TABLET | Refills: 3 | Status: SHIPPED | OUTPATIENT
Start: 2024-10-15 | End: 2025-10-15

## 2024-10-15 RX ORDER — SPIRONOLACTONE 25 MG/1
25 TABLET ORAL DAILY
Qty: 90 TABLET | Refills: 3 | Status: SHIPPED | OUTPATIENT
Start: 2024-10-15

## 2024-11-12 ENCOUNTER — ANCILLARY PROCEDURE (OUTPATIENT)
Dept: CARDIOLOGY | Facility: CLINIC | Age: 74
End: 2024-11-12
Payer: COMMERCIAL

## 2024-11-12 ENCOUNTER — OFFICE VISIT (OUTPATIENT)
Dept: CARDIOLOGY | Facility: CLINIC | Age: 74
End: 2024-11-12
Payer: COMMERCIAL

## 2024-11-12 VITALS
HEART RATE: 65 BPM | OXYGEN SATURATION: 98 % | WEIGHT: 122 LBS | SYSTOLIC BLOOD PRESSURE: 160 MMHG | DIASTOLIC BLOOD PRESSURE: 84 MMHG | HEIGHT: 65 IN | BODY MASS INDEX: 20.33 KG/M2

## 2024-11-12 DIAGNOSIS — I48.91 ATRIAL FIBRILLATION WITH RVR (MULTI): Primary | ICD-10-CM

## 2024-11-12 DIAGNOSIS — I65.22 STENOSIS OF LEFT CAROTID ARTERY: ICD-10-CM

## 2024-11-12 DIAGNOSIS — E78.2 MODERATE MIXED HYPERLIPIDEMIA NOT REQUIRING STATIN THERAPY: ICD-10-CM

## 2024-11-12 DIAGNOSIS — I73.9 PAD (PERIPHERAL ARTERY DISEASE) (CMS-HCC): ICD-10-CM

## 2024-11-12 DIAGNOSIS — E78.49 FAMILIAL COMBINED HYPERLIPIDEMIA: ICD-10-CM

## 2024-11-12 DIAGNOSIS — R06.02 SOB (SHORTNESS OF BREATH): ICD-10-CM

## 2024-11-12 PROCEDURE — 3079F DIAST BP 80-89 MM HG: CPT | Performed by: INTERNAL MEDICINE

## 2024-11-12 PROCEDURE — 1126F AMNT PAIN NOTED NONE PRSNT: CPT | Performed by: INTERNAL MEDICINE

## 2024-11-12 PROCEDURE — 3077F SYST BP >= 140 MM HG: CPT | Performed by: INTERNAL MEDICINE

## 2024-11-12 PROCEDURE — 1159F MED LIST DOCD IN RCRD: CPT | Performed by: INTERNAL MEDICINE

## 2024-11-12 PROCEDURE — 99417 PROLNG OP E/M EACH 15 MIN: CPT | Performed by: INTERNAL MEDICINE

## 2024-11-12 PROCEDURE — 3008F BODY MASS INDEX DOCD: CPT | Performed by: INTERNAL MEDICINE

## 2024-11-12 PROCEDURE — 99214 OFFICE O/P EST MOD 30 MIN: CPT | Performed by: INTERNAL MEDICINE

## 2024-11-12 PROCEDURE — 93005 ELECTROCARDIOGRAM TRACING: CPT | Performed by: INTERNAL MEDICINE

## 2024-11-12 PROCEDURE — 1157F ADVNC CARE PLAN IN RCRD: CPT | Performed by: INTERNAL MEDICINE

## 2024-11-12 PROCEDURE — 93005 ELECTROCARDIOGRAM TRACING: CPT

## 2024-11-12 ASSESSMENT — PAIN SCALES - GENERAL: PAINLEVEL_OUTOF10: 0-NO PAIN

## 2024-11-12 NOTE — PATIENT INSTRUCTIONS
"It was a pleasure seeing you today. I would like to see you back in clinic in 3-4 months  . You can call my office if questions arise between now and our next visit.     Today, we talked about your atrial fibrillation   -- Please continue to use Apixaban ( Eliquis ) 5 mg Twice Daily to prevent strokes  -- Please begin a medication call Nadolol. This will help with blood pressure and heart rates. We will start at a low dose of 20 mg Daily   -- Please continue Spironolactone 25 mg to help your blood pressure and your liver function   -- Please schedule a n appointment with a Vascular Surgeon to look at your neck artery   -- Please have blood work done before our next appointment.     Smoking Cessation   Please stop smoking.    --Try to cut back on the number of cigarettes that you smoke.    -- Try to increase the interval between cigarettes.   -- Try to use substitutes such as sugar-free candy carrots,celery sticks as in between.  --  Once you are down to less than half a pack a day try to go cold turkey.   -- Try to designate areas in the your home as smoke-free areas.   -- Try to reduce the number of ashtrays and other reminders of smoking.   -- Try to keep any major something that you dislike next to your cigarette pack to try to develop a mental aversion to smoking    [facility]  5.0(2) · Hypnotherapy service  [place], [place] · [phone]    Marin Alvarado M.A.  4.2(20) · Hypnotherapy service  [place], [place] · [phone]      Below are some Heart Health Tips that we provide to all of our patients. I hope you find them useful.     - If you are having problems with medications, consider looking at the following websites.   --  \"Good[employer]\"   --  \"[facility]\"      - We are happy to supply written prescriptions if needed to allow you to obtain your medications from different pharmacies. Additionally, if you are having issues with mail order delivery, please let us know. We " can send a limited supply of your medications to your local pharmacy.     -  We recommend you follow a heart healthy diet. Watch food labels and try not to eat more than 2,500 mg of sodium per day. Avoid foods high in salt like processed meats (lunch meats, taylor, and sausage), processed foods (boxed dinners, canned soups), fried and fast foods. Monitor serving sizes and if the sodium per serving size is more than 200 mg, avoid those foods. If the sodium per serving size is between 100-200 mg, you can use those in limited quantities. Try to choose foods where the amount of sodium per serving size is less than 100 mg. Try to eat a diet rich in fruits and vegetables, whole grains, low fat dairy products, skinless poultry and fish, nuts, beans, non-tropical vegetable oils. Limit saturated fat, trans fat, sodium, red meats, and sugar-sweetened beverages.   Limit alcohol     -The combination of a reduced-calorie diet and increased physical activity is recommended. Adults should aim to get at least 150 minutes of moderate physical activity per week (30 minutes of moderate physical activities at least 5 days per week). Examples of moderate physical activities include brisk walking, swimming, aerobic dancing, heavy gardening, jumping rope, bicycling 10 MPH or faster, tennis, hiking uphill or with a heavy backpack. Please let us know if you would like to learn more about your nutrition and calories and additional options including weight loss programs to help you reach your goal.     -If you smoke, stop smoking. If you stop smoking you can help get rid of a major source of stress to your heart. Smoking makes your heart rate and blood pressure go up and increases your risk or developing cardiovascular diseases and worsen symptoms associated with heart failure.     -Obtain a BP monitor and monitor your BP daily. Check it around the same time each day; at least 1 hour after taking your medications. Record your BP in a log and  bring your log with you to your doctors appointment.     -F/u with your PCP as recommended.

## 2024-11-12 NOTE — ASSESSMENT & PLAN NOTE
11/12/2024   -- In Office EKG shows Sinus Rhythm   -- Will continue Apixaban 5 mg BID  ++ She is only taking it daily.   ++ Reports cost is excessive ($42/month)

## 2024-11-12 NOTE — PROGRESS NOTES
Referred by FAITH Laura for Follow-up (5 month. Pt c/o SOB both with exertion and at rest)     HPI:    Svitlana Perry is a 74 y.o. female with pertinent history of COPD, hypertension, hyperlipidemia, alcohol and tobacco use disorder, pancreatitis, PVD, recent hospitalization for generalized fatigue and hypomagnesemia/hypokalemia  presents to cardiology clinic to establish care.     Recall, she was admitted to [facility] 20 4-23 through 5/27/2024 and repeat admission 5/29 through 531.  She initially presented with failure to thrive, confusion and inability to ambulate.  In the emergency department she was hypoxic, tachycardic and very confused.  She was in atrial fibrillation with rates to the 160s to 170s.  She was urgently cardioverted in the ED and started on heparin as well as IV amiodarone drip.  She was then transferred to stepdown unit where she was continued on amiodarone and ultimately switched to oral amiodarone plus oral Eliquis.  She was noted to have urinary tract infection was treated with 7-day duration of oral ciprofloxacin.  She was subsequently discharged.    Since she has returned home, she has overall done well.  She feels much more alert, more energetic.  She has not missed doses of her anticoagulation.  She has not felt heart palpitations.    11/12/2024 -- She reports that she has had intermittent periods of PND / Orthopnea.  She will fall asleep on her cough and then when she wakes up, she notes that she needs to do pursed lipped breathing for a few minutes and then she can go back to sleep. She notes that she has been using Treleghy once a day. She notes that she uses her Nebulizer frequently. She continues to smoke at least 1 PPD. Reports that she travels to [place] to Buy about 20 cartons at a time from [employer] stores.          No exacerbating or relieving factors.  Patient denies chest pain and angina.  Pt denies orthopnea, and paroxysmal nocturnal dyspnea.  Pt denies  worsening lower extremity edema.  Pt denies palpitations or syncope.  No recent falls.  No fever or chills.  No cough.  No change in bowel or bladder habits.  No sick contacts.  No recent travel.    12 point review of systems including (Constitutional, Eyes, ENMT, Respiratory, Cardiac, Gastrointestinal, Neurological, Psychiatric, and Hematologic) was performed and is otherwise negative.    Past medical history reviewed:   has no past medical history on file.    Past surgical history reviewed:   has a past surgical history that includes Other surgical history (02/15/2016); MR angio neck wo IV contrast (08/19/2019); MR angio head wo IV contrast (08/19/2019); MR angio neck wo IV contrast (11/30/2022); and Appendectomy.    Social history reviewed:   reports that she has been smoking cigarettes. She has never used smokeless tobacco. She reports that she does not currently use alcohol. She reports that she does not use drugs.     Family history reviewed:  No family history on file.    Allergies reviewed: Patient has no known allergies.     Medications reviewed:   Current Outpatient Medications   Medication Instructions    albuterol 90 mcg/actuation inhaler 2 puffs, Every 4 hours PRN    apixaban (ELIQUIS) 5 mg, oral, 2 times daily    flaxseed oiL 1,000 mg, oral, Daily    fluticasone furoate-vilanteroL (Breo Ellipta) 100-25 mcg/dose inhaler 1 puff, inhalation, Daily    folic acid (FOLVITE) 1 mg, oral, Daily    ipratropium-albuteroL (Duo-Neb) 0.5-2.5 mg/3 mL nebulizer solution USE 1 UNIT DOSE VIA NEBULIZER THREE TIMES DAILY.    magnesium oxide 400 mg, oral, Nightly    nadolol (CORGARD) 20 mg, oral, Daily    potassium chloride CR (Klor-Con) 10 mEq ER tablet 10 mEq, oral, Daily, Do not crush, chew, or split.    solifenacin (VESICARE) 5 mg, oral, Daily, Swallow tablet whole; do not crush, chew, or split.    spironolactone (ALDACTONE) 25 mg, oral, Daily    thiamine (VITAMIN B-1) 100 mg, oral, Daily        Vitals reviewed: Visit  "Vitals  /84 (Patient Position: Sitting)   Pulse 65         Physical Exam:   /84 (Patient Position: Sitting)   Pulse 65   Ht 1.651 m (5' 5\")   Wt 55.3 kg (122 lb)   SpO2 98%   BMI 20.30 kg/m²   General:  Patient is awake, alert, and oriented.  Patient is in no acute distress.  HEENT:  Pupils equal and reactive.  Normocephalic.  Moist mucosa.    Neck:  No thyromegaly.  Normal Jugular Venous Pressure.  Cardiovascular:  Regular rate and rhythm.  Normal S1 and S2.  1/6 RAFAEL.  Pulmonary:  Clear to auscultation bilaterally.  Abdomen:  Soft. Non-tender.   Non-distended.  Positive bowel sounds.  Lower Extremities:  2+ pedal pulses. No LE edema.  Neurologic:  Cranial nerves intact.  No focal deficit.   Skin: Skin warm and dry, normal skin turgor.   Psychiatric: Normal affect.    Last Labs:  CBC -      Lab Results   Component Value Date    WBC 8.8 07/12/2024    HGB 13.0 07/12/2024    HCT 41.4 07/12/2024     07/12/2024        CMP-  Lab Results   Component Value Date    GLUCOSE 83 07/12/2024     07/12/2024    K 5.3 07/12/2024     07/12/2024    CO2 25 07/12/2024    ANIONGAP 15 07/12/2024    BUN 15 07/12/2024    CREATININE 0.78 07/12/2024    EGFR 81 07/12/2024    CALCIUM 9.9 07/12/2024    PHOS 3.1 06/01/2024    PROT 7.1 07/12/2024    ALBUMIN 4.4 07/12/2024    AST 16 07/12/2024    ALT 13 07/12/2024    ALKPHOS 86 07/12/2024    BILITOT 0.5 07/12/2024        LIPIDS-  Lab Results   Component Value Date    CHOL 252 (H) 07/12/2024    TRIG 132 07/12/2024    HDL 42.6 07/12/2024    CHHDL 5.9 07/12/2024    VLDL 26 07/12/2024        OTHERS-  Lab Results   Component Value Date    HGBA1C 4.1 05/24/2024     (H) 05/23/2024        I personally reviewed the patient's recent vitals, labs, medications, orders, EKGs, pertinent cardiac imaging/ echocardiography and ischemic evaluations including stress testing/ cardiac catheterization.    Assessment and Plan:  Problem List Items Addressed This Visit       " Atrial fibrillation with RVR (Multi) - Primary    Overview     Paroxysmal Atrial Fibrillation noted   [id number] Stroke Risk Points: 3   Values used to calculate this score:    Points  Metrics       0        Has Congestive Heart Failure: No       1        Has Hypertension: Yes       1        Age: 74       0        Has Diabetes: No       0        Had Stroke: No                 Had TIA: No                 Had Thromboembolism: No       0        Has Vascular Disease: No       1        Clinically Relevant Sex: Female             Current Assessment & Plan     11/12/2024   -- In Office EKG shows Sinus Rhythm   -- Will continue Apixaban 5 mg BID  ++ She is only taking it daily.   ++ Reports cost is excessive ($42/month)          Relevant Orders    ECG 12 lead (Clinic Performed)    Referral to Clinical Pharmacy    Lipid Panel    Lipoprotein a    CBC and Auto Differential    Comprehensive metabolic panel    Carotid stenosis    Overview     Carotid Dopplers 7/2024   Left Carotid: Findings are consistent with greater than 70% stenosis of the left proximal internal carotid artery. Turbulent flow seen by color Doppler.       Carotid MRI -- 11/2022  Occlusion of the right vertebral artery origin with partial  reconstitution beginning at the distal right V2 segment.     Estimated 70% stenosis of the proximal left internal carotid artery  and 50% stenosis of the proximal right internal carotid artery by  NASCET criteria.         Current Assessment & Plan     Referral to [facility]          Relevant Medications    evolocumab (Repatha SureClick) injection 140 mg (Start on 11/12/2024 11:15 AM)    Other Relevant Orders    ECG 12 lead (Clinic Performed)    Referral to Clinical Pharmacy    Referral to [facility] - Vascular Surgery    Lipid Panel    Lipoprotein a    CBC and Auto Differential    Comprehensive metabolic panel    Familial combined hyperlipidemia    Overview     The 10-year ASCVD  "risk score (Tomasz GIRALDO, et al., 2019) is: 36.3%    Values used to calculate the score:      Age: 74 years      Sex: Female      Is Non- : No      Diabetic: No      Tobacco smoker: Yes      Systolic Blood Pressure: 160 mmHg      Is BP treated: Yes      HDL Cholesterol: 42.6 mg/dL      Total Cholesterol: 252 mg/dL    Lab Results   Component Value Date    CHOL 252 (H) 07/12/2024    CHOL 224 (H) 02/16/2023    CHOL 201 (H) 12/27/2021     Lab Results   Component Value Date    HDL 42.6 07/12/2024    HDL 55.5 02/16/2023    HDL 48.0 12/27/2021     Lab Results   Component Value Date    LDLCALC 183 (H) 07/12/2024     Lab Results   Component Value Date    TRIG 132 07/12/2024    TRIG 82 02/16/2023    TRIG 64 12/27/2021     No components found for: \"CHOLHDL\"           Relevant Medications    evolocumab (Repatha SureClick) injection 140 mg (Start on 11/12/2024 11:15 AM)    Other Relevant Orders    ECG 12 lead (Clinic Performed)    Referral to Clinical Pharmacy    Lipid Panel    Lipoprotein a    CBC and Auto Differential    Comprehensive metabolic panel    Hyperlipidemia    Overview     She has a Hx of Cirrhosis and has had evidence of worsening liver dysfunction when on Statin Therapy          Relevant Medications    evolocumab (Repatha SureClick) injection 140 mg (Start on 11/12/2024 11:15 AM)    Other Relevant Orders    ECG 12 lead (Clinic Performed)    Lipid Panel    Lipoprotein a    CBC and Auto Differential    Comprehensive metabolic panel    PAD (peripheral artery disease) (CMS-HCC)    Overview     Left Carotid: Findings are consistent with greater than 70% stenosis of the left proximal internal carotid artery. Turbulent flow seen by color Doppler.          Current Assessment & Plan     Referral to [facility]          Relevant Medications    evolocumab (Repatha SureClick) injection 140 mg (Start on 11/12/2024 11:15 AM)    Other Relevant Orders    ECG 12 lead (Clinic " Performed)    Referral to Clinical Pharmacy    Referral to [facility] [place] - Vascular Surgery    Lipid Panel    Lipoprotein a    CBC and Auto Differential    Comprehensive metabolic panel    SOB (shortness of breath)    Relevant Orders    B-Type Natriuretic Peptide           Please followup with me in Cardiology clinic within the next 3-4 Months   Please return to clinic sooner or seek emergent care if your symptoms reoccur or worsen.    Thank you for allowing me to participate in their care.  Please feel free to call me with any further questions or concerns.        Mike Laura DO   Division of Cardiovascular Medicine  [facility] [place], [facility]

## 2024-11-14 DIAGNOSIS — I10 HYPERTENSION, UNSPECIFIED TYPE: Primary | ICD-10-CM

## 2024-11-21 ENCOUNTER — APPOINTMENT (OUTPATIENT)
Dept: PRIMARY CARE | Facility: CLINIC | Age: 74
End: 2024-11-21
Payer: COMMERCIAL

## 2024-11-21 ENCOUNTER — LAB (OUTPATIENT)
Dept: LAB | Facility: LAB | Age: 74
End: 2024-11-21
Payer: COMMERCIAL

## 2024-11-21 DIAGNOSIS — I10 PRIMARY HYPERTENSION: ICD-10-CM

## 2024-11-21 DIAGNOSIS — I73.9 PAD (PERIPHERAL ARTERY DISEASE) (CMS-HCC): ICD-10-CM

## 2024-11-21 DIAGNOSIS — I10 BENIGN ESSENTIAL HYPERTENSION: ICD-10-CM

## 2024-11-21 DIAGNOSIS — E78.2 MODERATE MIXED HYPERLIPIDEMIA NOT REQUIRING STATIN THERAPY: ICD-10-CM

## 2024-11-21 DIAGNOSIS — E55.9 VITAMIN D DEFICIENCY: ICD-10-CM

## 2024-11-21 DIAGNOSIS — E87.6 HYPOKALEMIA: ICD-10-CM

## 2024-11-21 DIAGNOSIS — E03.9 HYPOTHYROIDISM, UNSPECIFIED TYPE: ICD-10-CM

## 2024-11-21 DIAGNOSIS — E78.00 ELEVATED LDL CHOLESTEROL LEVEL: ICD-10-CM

## 2024-11-21 DIAGNOSIS — I48.91 ATRIAL FIBRILLATION WITH RVR (MULTI): ICD-10-CM

## 2024-11-21 DIAGNOSIS — R06.02 SOB (SHORTNESS OF BREATH): ICD-10-CM

## 2024-11-21 DIAGNOSIS — I10 ESSENTIAL (PRIMARY) HYPERTENSION: ICD-10-CM

## 2024-11-21 DIAGNOSIS — M06.9 RHEUMATOID ARTHRITIS, INVOLVING UNSPECIFIED SITE, UNSPECIFIED WHETHER RHEUMATOID FACTOR PRESENT (MULTI): ICD-10-CM

## 2024-11-21 DIAGNOSIS — I65.22 STENOSIS OF LEFT CAROTID ARTERY: ICD-10-CM

## 2024-11-21 DIAGNOSIS — Z00.00 ENCOUNTER FOR ANNUAL WELLNESS VISIT (AWV) IN MEDICARE PATIENT: ICD-10-CM

## 2024-11-21 DIAGNOSIS — D50.9 IRON DEFICIENCY ANEMIA, UNSPECIFIED IRON DEFICIENCY ANEMIA TYPE: ICD-10-CM

## 2024-11-21 DIAGNOSIS — E88.89 HOFFA'S FAT PAD DISEASE (MULTI): ICD-10-CM

## 2024-11-21 DIAGNOSIS — E78.49 FAMILIAL COMBINED HYPERLIPIDEMIA: ICD-10-CM

## 2024-11-21 LAB
25(OH)D3 SERPL-MCNC: 31 NG/ML (ref 30–100)
BASOPHILS # BLD AUTO: 0.18 X10*3/UL (ref 0–0.1)
BASOPHILS NFR BLD AUTO: 2 %
EOSINOPHIL # BLD AUTO: 0.82 X10*3/UL (ref 0–0.4)
EOSINOPHIL NFR BLD AUTO: 9.3 %
ERYTHROCYTE [DISTWIDTH] IN BLOOD BY AUTOMATED COUNT: 15.2 % (ref 11.5–14.5)
HCT VFR BLD AUTO: 40.8 % (ref 36–46)
HGB BLD-MCNC: 12.7 G/DL (ref 12–16)
IMM GRANULOCYTES # BLD AUTO: 0.04 X10*3/UL (ref 0–0.5)
IMM GRANULOCYTES NFR BLD AUTO: 0.5 % (ref 0–0.9)
LYMPHOCYTES # BLD AUTO: 3.72 X10*3/UL (ref 0.8–3)
LYMPHOCYTES NFR BLD AUTO: 42.1 %
MCH RBC QN AUTO: 31.3 PG (ref 26–34)
MCHC RBC AUTO-ENTMCNC: 31.1 G/DL (ref 32–36)
MCV RBC AUTO: 101 FL (ref 80–100)
MONOCYTES # BLD AUTO: 0.57 X10*3/UL (ref 0.05–0.8)
MONOCYTES NFR BLD AUTO: 6.4 %
NEUTROPHILS # BLD AUTO: 3.51 X10*3/UL (ref 1.6–5.5)
NEUTROPHILS NFR BLD AUTO: 39.7 %
NRBC BLD-RTO: 0 /100 WBCS (ref 0–0)
PLATELET # BLD AUTO: 225 X10*3/UL (ref 150–450)
RBC # BLD AUTO: 4.06 X10*6/UL (ref 4–5.2)
TSH SERPL-ACNC: 4.78 MIU/L (ref 0.44–3.98)
WBC # BLD AUTO: 8.8 X10*3/UL (ref 4.4–11.3)

## 2024-11-21 PROCEDURE — 85025 COMPLETE CBC W/AUTO DIFF WBC: CPT

## 2024-11-21 PROCEDURE — 80061 LIPID PANEL: CPT

## 2024-11-21 PROCEDURE — 82306 VITAMIN D 25 HYDROXY: CPT

## 2024-11-21 PROCEDURE — 82172 ASSAY OF APOLIPOPROTEIN: CPT

## 2024-11-21 PROCEDURE — 84443 ASSAY THYROID STIM HORMONE: CPT

## 2024-11-21 PROCEDURE — 80053 COMPREHEN METABOLIC PANEL: CPT

## 2024-11-22 LAB
ALBUMIN SERPL BCP-MCNC: 4.5 G/DL (ref 3.4–5)
ALP SERPL-CCNC: 83 U/L (ref 33–136)
ALT SERPL W P-5'-P-CCNC: 13 U/L (ref 7–45)
ANION GAP SERPL CALC-SCNC: 15 MMOL/L (ref 10–20)
AST SERPL W P-5'-P-CCNC: 16 U/L (ref 9–39)
ATRIAL RATE: 65 BPM
BILIRUB SERPL-MCNC: 0.4 MG/DL (ref 0–1.2)
BODY SURFACE AREA: 1.59 M2
BUN SERPL-MCNC: 19 MG/DL (ref 6–23)
CALCIUM SERPL-MCNC: 9.4 MG/DL (ref 8.6–10.6)
CHLORIDE SERPL-SCNC: 103 MMOL/L (ref 98–107)
CHOLEST SERPL-MCNC: 210 MG/DL (ref 0–199)
CHOLESTEROL/HDL RATIO: 4.5
CO2 SERPL-SCNC: 27 MMOL/L (ref 21–32)
CREAT SERPL-MCNC: 0.71 MG/DL (ref 0.5–1.05)
EGFRCR SERPLBLD CKD-EPI 2021: 90 ML/MIN/1.73M*2
GLUCOSE SERPL-MCNC: 38 MG/DL (ref 74–99)
HDLC SERPL-MCNC: 46.6 MG/DL
LDLC SERPL CALC-MCNC: 142 MG/DL
NON HDL CHOLESTEROL: 163 MG/DL (ref 0–149)
P AXIS: 77 DEGREES
P OFFSET: 212 MS
P ONSET: 154 MS
POTASSIUM SERPL-SCNC: 5 MMOL/L (ref 3.5–5.3)
PR INTERVAL: 144 MS
PROT SERPL-MCNC: 6.4 G/DL (ref 6.4–8.2)
Q ONSET: 226 MS
QRS COUNT: 11 BEATS
QRS DURATION: 68 MS
QT INTERVAL: 392 MS
QTC CALCULATION(BAZETT): 407 MS
QTC FREDERICIA: 402 MS
R AXIS: 80 DEGREES
SODIUM SERPL-SCNC: 140 MMOL/L (ref 136–145)
T AXIS: 75 DEGREES
T OFFSET: 422 MS
TRIGL SERPL-MCNC: 108 MG/DL (ref 0–149)
VENTRICULAR RATE: 65 BPM
VLDL: 22 MG/DL (ref 0–40)

## 2024-11-26 ENCOUNTER — OFFICE VISIT (OUTPATIENT)
Dept: VASCULAR SURGERY | Facility: HOSPITAL | Age: 74
End: 2024-11-26
Payer: COMMERCIAL

## 2024-11-26 VITALS
BODY MASS INDEX: 21.16 KG/M2 | HEIGHT: 65 IN | HEART RATE: 83 BPM | DIASTOLIC BLOOD PRESSURE: 71 MMHG | SYSTOLIC BLOOD PRESSURE: 119 MMHG | OXYGEN SATURATION: 98 % | WEIGHT: 127 LBS

## 2024-11-26 DIAGNOSIS — I71.40 ABDOMINAL AORTIC ANEURYSM (AAA) WITHOUT RUPTURE, UNSPECIFIED PART (CMS-HCC): Primary | ICD-10-CM

## 2024-11-26 DIAGNOSIS — I65.22 STENOSIS OF LEFT CAROTID ARTERY: ICD-10-CM

## 2024-11-26 DIAGNOSIS — I71.43 INFRARENAL ABDOMINAL AORTIC ANEURYSM (AAA) WITHOUT RUPTURE (CMS-HCC): ICD-10-CM

## 2024-11-26 DIAGNOSIS — I65.23 BILATERAL CAROTID ARTERY STENOSIS: ICD-10-CM

## 2024-11-26 DIAGNOSIS — I73.9 PAD (PERIPHERAL ARTERY DISEASE) (CMS-HCC): ICD-10-CM

## 2024-11-26 LAB
ATRIAL RATE: 65 BPM
P AXIS: 77 DEGREES
P OFFSET: 212 MS
P ONSET: 154 MS
PR INTERVAL: 144 MS
Q ONSET: 226 MS
QRS COUNT: 11 BEATS
QRS DURATION: 68 MS
QT INTERVAL: 392 MS
QTC CALCULATION(BAZETT): 407 MS
QTC FREDERICIA: 402 MS
R AXIS: 80 DEGREES
T AXIS: 75 DEGREES
T OFFSET: 422 MS
VENTRICULAR RATE: 65 BPM

## 2024-11-26 PROCEDURE — 3078F DIAST BP <80 MM HG: CPT | Performed by: STUDENT IN AN ORGANIZED HEALTH CARE EDUCATION/TRAINING PROGRAM

## 2024-11-26 PROCEDURE — 3074F SYST BP LT 130 MM HG: CPT | Performed by: STUDENT IN AN ORGANIZED HEALTH CARE EDUCATION/TRAINING PROGRAM

## 2024-11-26 PROCEDURE — 99214 OFFICE O/P EST MOD 30 MIN: CPT | Performed by: STUDENT IN AN ORGANIZED HEALTH CARE EDUCATION/TRAINING PROGRAM

## 2024-11-26 PROCEDURE — 3008F BODY MASS INDEX DOCD: CPT | Performed by: STUDENT IN AN ORGANIZED HEALTH CARE EDUCATION/TRAINING PROGRAM

## 2024-11-26 PROCEDURE — 99204 OFFICE O/P NEW MOD 45 MIN: CPT | Performed by: STUDENT IN AN ORGANIZED HEALTH CARE EDUCATION/TRAINING PROGRAM

## 2024-11-26 PROCEDURE — 1157F ADVNC CARE PLAN IN RCRD: CPT | Performed by: STUDENT IN AN ORGANIZED HEALTH CARE EDUCATION/TRAINING PROGRAM

## 2024-11-26 RX ORDER — ASPIRIN 81 MG/1
81 TABLET ORAL DAILY
Qty: 30 TABLET | Refills: 11 | Status: SHIPPED | OUTPATIENT
Start: 2024-11-26 | End: 2025-11-26

## 2024-11-26 NOTE — PROGRESS NOTES
Call dr. Laura about repatha.      Primary Care Physician: Demarco Gonzalez MD  [place]: [place]  [id number]  [facility] [place] [id number]  Date of Visit: 11/26/2024  3:00 PM EST  Location of visit: [facility]     Chief Complaint:   No chief complaint on file.       HPI / Summary:   Svitlana Perry is a 74 y.o. female presents for follow up for chronic PAD and bilateral carotid stenosis. Regarding her carotid stenosis, she had a repeat duplex that noted high grade stenosis in her L ICA. This is chronic and stable. She had similar velocities and ratio since 2019. Pt remains asymptomatic. She denies amaurosis fugax, slurred speech and extremity weakness.     She has claudication in both lower extremities, R=L. Her pain is relieved with rest. This is not lifestyle limiting. She has known PAD and her last ANTONIO was noted to be L 0.68 and R 0.77. This was last documented in 2019.     She has an infrarenal AAA. Last measured on CT scan to be 3.4 cm. This scan was performed 9/2023.  She does not have abdominal pain.     Pt is a current smoker. She would like to quit and wants to try hypnosis as an adjunct to help assist her in quitting.     Pt has significant neuropathic pain in her feet. This has been worked up extensively per patient. She describes it as a burning, tingling pain.     She does not take statins given h/o cirrhosis. She is supposed to be on repatha but has not gotten a full rx for this.    PMH: COPD, HTN, HLD, alcoholic cirrhosis  PSH: appendectomy, wrist surgery  Soc: + smoking, former alcohol user  Fam: mother with AAA     Past Vascular Testing:     Allergies:   Patient has no known allergies.    Outpatient Medications:  Current Outpatient Medications   Medication Sig Dispense Refill    albuterol 90 mcg/actuation inhaler Inhale 2 puffs every 4 hours if needed.      apixaban (Eliquis) 5 mg tablet Take 1 tablet (5 mg) by mouth 2  times a day. (Patient taking differently: Take 1 tablet (5 mg) by mouth 2 times a day. Pt taking once a day) 180 tablet 3    aspirin 81 mg EC tablet Take 1 tablet (81 mg) by mouth once daily. 30 tablet 11    ciprofloxacin (Cipro) 500 mg tablet Take 1 tablet (500 mg) by mouth 2 times a day for 10 days. 20 tablet 0    evolocumab (Repatha SureClick) 140 mg/mL injection Inject 1 mL (140 mg) under the skin every 14 (fourteen) days. 1 mL 3    flaxseed oiL 1,000 mg capsule Take 1 capsule (1,000 mg) by mouth once daily. 90 capsule 3    fluticasone furoate-vilanteroL (Breo Ellipta) 100-25 mcg/dose inhaler Inhale 1 puff once daily. 60 each 3    fluticasone furoate-vilanteroL (Breo Ellipta) 100-25 mcg/dose inhaler Inhale 1 puff once daily. 60 each 3    fluticasone-umeclidin-vilanter (Trelegy Ellipta) 100-62.5-25 mcg blister with device Inhale 1 puff once daily. 60 each 3    folic acid (Folvite) 1 mg tablet Take 1 tablet (1 mg) by mouth once daily. 30 tablet 11    ipratropium-albuteroL (Duo-Neb) 0.5-2.5 mg/3 mL nebulizer solution USE 1 UNIT DOSE VIA NEBULIZER THREE TIMES DAILY. 270 mL 1    magnesium oxide 400 mg magnesium capsule Take 1 capsule (400 mg) by mouth once daily at bedtime. 90 capsule 3    nadolol (Corgard) 20 mg tablet Take 1 tablet (20 mg) by mouth once daily. 90 tablet 3    nitrofurantoin (Macrodantin) 50 mg capsule Take 1 capsule (50 mg) by mouth 4 times a day for 10 days. 40 capsule 0    potassium chloride CR (Klor-Con) 10 mEq ER tablet Take 1 tablet (10 mEq) by mouth once daily. Do not crush, chew, or split. 30 tablet 11    solifenacin (Vesicare) 5 mg tablet Take 1 tablet (5 mg) by mouth once daily. Swallow tablet whole; do not crush, chew, or split. (Patient not taking: Reported on 11/12/2024) 30 tablet 11    spironolactone (Aldactone) 25 mg tablet TAKE 1 TABLET BY MOUTH EVERY DAY 90 tablet 3    thiamine (Vitamin B-1) 100 mg tablet Take 1 tablet (100 mg) by mouth once daily. (Patient not taking: Reported on  "11/12/2024) 30 tablet 11     Current Facility-Administered Medications   Medication Dose Route Frequency Provider Last Rate Last Admin    evolocumab (Repatha SureClick) injection 140 mg  140 mg subcutaneous Once Mike Laura DO        fluticasone-umeclidin-vilanter (TRELEGY-ELLIPTA) 100-62.5-25 mcg 100-62.5-25 mcg/puff inhaler 1 puff  1 puff inhalation BID Demarco Gonzalez MD         Review of Systems:  Review of Systems     Physical Exam:  Blood pressure 119/71, pulse 83, height 1.651 m (5' 5\"), weight 57.6 kg (127 lb), SpO2 98%.  Wt Readings from Last 1 Encounters:   12/02/24 57.2 kg (126 lb)     Physical Exam  Neuro: alert and oriented x3  Resp: comfortable on room air  CV: well perfused  Abd: soft ntnd  Pulse exam: non palp L femoral pulse, palp R femoral pulse; doppler pedal signals BL, no wounds    Last Labs:  CMP:    Chemistry    Lab Results   Component Value Date/Time     11/21/2024 1745    K 5.0 11/21/2024 1745     11/21/2024 1745    CO2 27 11/21/2024 1745    BUN 19 11/21/2024 1745    CREATININE 0.71 11/21/2024 1745    Lab Results   Component Value Date/Time    CALCIUM 9.4 11/21/2024 1745    ALKPHOS 83 11/21/2024 1745    AST 16 11/21/2024 1745    ALT 13 11/21/2024 1745    BILITOT 0.4 11/21/2024 1745          CBC:  Lab Results   Component Value Date    WBC 8.8 11/21/2024    HGB 12.7 11/21/2024    HCT 40.8 11/21/2024     (H) 11/21/2024     11/21/2024     HEME/ENDO:  Recent Labs     11/21/24  1745 05/24/24  0752 12/15/20  1700 05/31/20  0457   FERRITIN  --  3,541*  --  3,246*   IRONSAT  --   --   --  NOT CALC.   TSH 4.78* 3.38   < >  --    HGBA1C  --  4.1  --   --     < > = values in this interval not displayed.      CARDIAC: No data recorded  Lab Results   Component Value Date    LDLCALC 142 (H) 11/21/2024       Notable Studies:         Assessment/Plan   74F with PMH alcoholic cirrhosis, HTN, HLD who presents for evaluation of carotid stenosis, PAD and AAA.     #carotid " stenosis  - has had stable duplex findings x 5 years  - needs full rx for repatha since she cannot take statins, will send note to Dr. Laura  - rx asa 81 mg    #AAA  - needs surveillance imaging, was 3.4 cm on last CT 1 y ago  - surveillance duplex in 6 mos    #PAD  - pt with claudication, no rest pain or wounds  - chronic severe neuropathic pain, has had extensive workup for this    #tobacco use disorder  - pt interested in quitting, does not want tobacco cessation referral as she plans to self refer to a hypnotist      RTC 6 mos with ANTONIO/PVR, carotid duplex and AAA duplex    Orders:  No orders of the defined types were placed in this encounter.             ____________________________________________________________  Kaitlyn M Dunphy, MD  [facility] [place]  [facility]

## 2024-11-26 NOTE — PATIENT INSTRUCTIONS
Return in 6 months with the following imaging completed before your appointment:   - PVR ultrasound, for your legs   - Aorta ultrasound, for your abdominal aneurysm   - Carotid artery ultrasound, for carotid artery stenosis    2. Start Aspirin 81 mg once daily, buy the enteric coated (EC) aspirin, it is coated and protect your stomach lining.     3. If you develop stroke-like symptoms call 911.     4. We will contact your cardiologist about your Repatha.    5. If you develop pain in your feet at rest, call our office [phone], option #3.

## 2024-12-02 ENCOUNTER — TELEPHONE (OUTPATIENT)
Dept: PRIMARY CARE | Facility: CLINIC | Age: 74
End: 2024-12-02

## 2024-12-02 ENCOUNTER — APPOINTMENT (OUTPATIENT)
Dept: PRIMARY CARE | Facility: CLINIC | Age: 74
End: 2024-12-02
Payer: COMMERCIAL

## 2024-12-02 VITALS
SYSTOLIC BLOOD PRESSURE: 140 MMHG | HEART RATE: 69 BPM | DIASTOLIC BLOOD PRESSURE: 80 MMHG | WEIGHT: 126 LBS | BODY MASS INDEX: 20.97 KG/M2 | RESPIRATION RATE: 12 BRPM | OXYGEN SATURATION: 96 %

## 2024-12-02 DIAGNOSIS — Z00.00 ENCOUNTER FOR ANNUAL WELLNESS VISIT (AWV) IN MEDICARE PATIENT: ICD-10-CM

## 2024-12-02 DIAGNOSIS — I71.43 INFRARENAL ABDOMINAL AORTIC ANEURYSM (AAA) WITHOUT RUPTURE (CMS-HCC): ICD-10-CM

## 2024-12-02 DIAGNOSIS — I10 HYPERTENSION, UNSPECIFIED TYPE: Primary | ICD-10-CM

## 2024-12-02 DIAGNOSIS — J44.9 CHRONIC OBSTRUCTIVE PULMONARY DISEASE, UNSPECIFIED COPD TYPE (MULTI): ICD-10-CM

## 2024-12-02 DIAGNOSIS — I48.91 ATRIAL FIBRILLATION WITH RVR (MULTI): ICD-10-CM

## 2024-12-02 DIAGNOSIS — I10 HTN (HYPERTENSION): Primary | ICD-10-CM

## 2024-12-02 DIAGNOSIS — N39.0 URINARY TRACT INFECTION WITHOUT HEMATURIA, SITE UNSPECIFIED: ICD-10-CM

## 2024-12-02 DIAGNOSIS — E78.49 FAMILIAL COMBINED HYPERLIPIDEMIA: ICD-10-CM

## 2024-12-02 DIAGNOSIS — R91.1 PULMONARY NODULE: ICD-10-CM

## 2024-12-02 LAB
POC APPEARANCE, URINE: ABNORMAL
POC BILIRUBIN, URINE: NEGATIVE
POC BLOOD, URINE: NEGATIVE
POC COLOR, URINE: ABNORMAL
POC GLUCOSE, URINE: NEGATIVE MG/DL
POC KETONES, URINE: NEGATIVE MG/DL
POC LEUKOCYTES, URINE: NEGATIVE
POC NITRITE,URINE: POSITIVE
POC OCCULT BLOOD STOOL #1: NEGATIVE
POC PH, URINE: 5 PH
POC PROTEIN, URINE: NEGATIVE MG/DL
POC SPECIFIC GRAVITY, URINE: 1.01
POC UROBILINOGEN, URINE: 0.2 EU/DL

## 2024-12-02 PROCEDURE — 1159F MED LIST DOCD IN RCRD: CPT | Performed by: INTERNAL MEDICINE

## 2024-12-02 PROCEDURE — G0439 PPPS, SUBSEQ VISIT: HCPCS | Performed by: INTERNAL MEDICINE

## 2024-12-02 PROCEDURE — 81002 URINALYSIS NONAUTO W/O SCOPE: CPT | Performed by: INTERNAL MEDICINE

## 2024-12-02 PROCEDURE — 3077F SYST BP >= 140 MM HG: CPT | Performed by: INTERNAL MEDICINE

## 2024-12-02 PROCEDURE — 82270 OCCULT BLOOD FECES: CPT | Performed by: INTERNAL MEDICINE

## 2024-12-02 PROCEDURE — 99214 OFFICE O/P EST MOD 30 MIN: CPT | Performed by: INTERNAL MEDICINE

## 2024-12-02 PROCEDURE — 3079F DIAST BP 80-89 MM HG: CPT | Performed by: INTERNAL MEDICINE

## 2024-12-02 PROCEDURE — 93000 ELECTROCARDIOGRAM COMPLETE: CPT | Performed by: INTERNAL MEDICINE

## 2024-12-02 PROCEDURE — 1157F ADVNC CARE PLAN IN RCRD: CPT | Performed by: INTERNAL MEDICINE

## 2024-12-02 RX ORDER — FLUTICASONE FUROATE AND VILANTEROL 100; 25 UG/1; UG/1
1 POWDER RESPIRATORY (INHALATION) DAILY
Qty: 60 EACH | Refills: 3 | Status: SHIPPED | OUTPATIENT
Start: 2024-12-02

## 2024-12-02 RX ORDER — FLUTICASONE FUROATE, UMECLIDINIUM BROMIDE AND VILANTEROL TRIFENATATE 100; 62.5; 25 UG/1; UG/1; UG/1
1 POWDER RESPIRATORY (INHALATION) DAILY
Qty: 60 EACH | Refills: 3 | Status: SHIPPED | OUTPATIENT
Start: 2024-12-02

## 2024-12-02 RX ORDER — CIPROFLOXACIN 500 MG/1
500 TABLET ORAL 2 TIMES DAILY
Qty: 20 TABLET | Refills: 0 | Status: SHIPPED | OUTPATIENT
Start: 2024-12-02 | End: 2024-12-12

## 2024-12-02 RX ORDER — EVOLOCUMAB 140 MG/ML
140 INJECTION, SOLUTION SUBCUTANEOUS
Qty: 1 ML | Refills: 3 | Status: SHIPPED | OUTPATIENT
Start: 2024-12-02

## 2024-12-02 RX ORDER — NITROFURANTOIN MACROCRYSTALS 50 MG/1
50 CAPSULE ORAL 4 TIMES DAILY
Qty: 40 CAPSULE | Refills: 0 | Status: SHIPPED | OUTPATIENT
Start: 2024-12-02 | End: 2024-12-12

## 2024-12-02 ASSESSMENT — PATIENT HEALTH QUESTIONNAIRE - PHQ9
SUM OF ALL RESPONSES TO PHQ9 QUESTIONS 1 AND 2: 0
1. LITTLE INTEREST OR PLEASURE IN DOING THINGS: NOT AT ALL
2. FEELING DOWN, DEPRESSED OR HOPELESS: NOT AT ALL

## 2024-12-02 ASSESSMENT — ENCOUNTER SYMPTOMS
DEPRESSION: 0
OCCASIONAL FEELINGS OF UNSTEADINESS: 0
LOSS OF SENSATION IN FEET: 0

## 2024-12-02 NOTE — ASSESSMENT & PLAN NOTE
Continue flax seed oil - currently taking 2 pills after lunch and dinner    Cholesterol panel trending in right direction    Ordered repatha injection at home; start use if insurance covers (if not, will try statin)

## 2024-12-02 NOTE — PROGRESS NOTES
"ANNUAL WELLNESS VISIT    Subjective :  Chief Complaint: Svitlana Perry is an 74 y.o. female here for an annual wellness visit and general medical care and f/u.     HPI:  Pt presents for annual wellness exam with PMH HLD, afib, and COPD.    Endorses about one month of urinary urgency/ frequency - \"constantly wanting to go\". Endorses off and on burning. Denies blood in urine. Denies fever and chillds.     Endorses occasional palpations and SOB.         Objective   /80   Pulse 69   Resp 12   Wt 57.2 kg (126 lb)   SpO2 96%   BMI 20.97 kg/m²     Physical Exam  Constitutional:       General: She is not in acute distress.     Appearance: Normal appearance. She is not ill-appearing or toxic-appearing.   HENT:      Head: Normocephalic and atraumatic.      Right Ear: External ear normal.      Left Ear: External ear normal.      Nose: Nose normal.   Eyes:      Extraocular Movements: Extraocular movements intact.   Neck:      Vascular: No carotid bruit.   Cardiovascular:      Rate and Rhythm: Normal rate and regular rhythm.      Heart sounds: Normal heart sounds.   Pulmonary:      Effort: Pulmonary effort is normal. No respiratory distress.      Breath sounds: Normal breath sounds. No wheezing.   Abdominal:      General: Abdomen is flat. Bowel sounds are normal. There is no distension.      Palpations: Abdomen is soft. There is no mass.      Tenderness: There is no abdominal tenderness. There is no guarding.      Hernia: No hernia is present.   Musculoskeletal:         General: No swelling.      Cervical back: Neck supple. No rigidity or tenderness.   Lymphadenopathy:      Cervical: No cervical adenopathy.   Skin:     General: Skin is warm and dry.      Coloration: Skin is not jaundiced.   Neurological:      General: No focal deficit present.      Mental Status: She is alert.   Psychiatric:         Mood and Affect: Mood normal.         Behavior: Behavior normal.         Thought Content: Thought content normal.    "      Judgment: Judgment normal.         Imaging:  ECG 12 lead (Clinic Performed)    Result Date: 11/26/2024  Normal sinus rhythm Normal ECG When compared with ECG of 14-JUN-2024 11:10, No significant change was found Confirmed by Mike Laura ([id number]) on 11/26/2024 7:31:18 PM       Labs reviewed:    Lab Results   Component Value Date    WBC 8.8 11/21/2024    HGB 12.7 11/21/2024    HCT 40.8 11/21/2024     11/21/2024    CHOL 210 (H) 11/21/2024    TRIG 108 11/21/2024    HDL 46.6 11/21/2024    ALT 13 11/21/2024    AST 16 11/21/2024     11/21/2024    K 5.0 11/21/2024     11/21/2024    CREATININE 0.71 11/21/2024    BUN 19 11/21/2024    CO2 27 11/21/2024    TSH 4.78 (H) 11/21/2024    INR 1.2 (H) 05/29/2024    HGBA1C 4.1 05/24/2024       Past Medical, Surgical, and Family History reviewed and updated in chart.    I have reviewed and reconciled the medication list with the patient today.   Current Outpatient Medications:     albuterol 90 mcg/actuation inhaler, Inhale 2 puffs every 4 hours if needed., Disp: , Rfl:     apixaban (Eliquis) 5 mg tablet, Take 1 tablet (5 mg) by mouth 2 times a day. (Patient taking differently: Take 1 tablet (5 mg) by mouth 2 times a day. Pt taking once a day), Disp: 180 tablet, Rfl: 3    aspirin 81 mg EC tablet, Take 1 tablet (81 mg) by mouth once daily., Disp: 30 tablet, Rfl: 11    flaxseed oiL 1,000 mg capsule, Take 1 capsule (1,000 mg) by mouth once daily., Disp: 90 capsule, Rfl: 3    fluticasone furoate-vilanteroL (Breo Ellipta) 100-25 mcg/dose inhaler, Inhale 1 puff once daily., Disp: 60 each, Rfl: 3    folic acid (Folvite) 1 mg tablet, Take 1 tablet (1 mg) by mouth once daily., Disp: 30 tablet, Rfl: 11    ipratropium-albuteroL (Duo-Neb) 0.5-2.5 mg/3 mL nebulizer solution, USE 1 UNIT DOSE VIA NEBULIZER THREE TIMES DAILY., Disp: 270 mL, Rfl: 1    magnesium oxide 400 mg magnesium capsule, Take 1 capsule (400 mg) by mouth once daily at bedtime., Disp: 90 capsule, Rfl: 3     nadolol (Corgard) 20 mg tablet, Take 1 tablet (20 mg) by mouth once daily., Disp: 90 tablet, Rfl: 3    potassium chloride CR (Klor-Con) 10 mEq ER tablet, Take 1 tablet (10 mEq) by mouth once daily. Do not crush, chew, or split., Disp: 30 tablet, Rfl: 11    solifenacin (Vesicare) 5 mg tablet, Take 1 tablet (5 mg) by mouth once daily. Swallow tablet whole; do not crush, chew, or split. (Patient not taking: Reported on 11/12/2024), Disp: 30 tablet, Rfl: 11    spironolactone (Aldactone) 25 mg tablet, TAKE 1 TABLET BY MOUTH EVERY DAY, Disp: 90 tablet, Rfl: 3    thiamine (Vitamin B-1) 100 mg tablet, Take 1 tablet (100 mg) by mouth once daily. (Patient not taking: Reported on 11/12/2024), Disp: 30 tablet, Rfl: 11    Current Facility-Administered Medications:     evolocumab (Repatha SureClick) injection 140 mg, 140 mg, subcutaneous, Once, Mike Laura,     fluticasone-umeclidin-vilanter (TRELEGY-ELLIPTA) 100-62.5-25 mcg 100-62.5-25 mcg/puff inhaler 1 puff, 1 puff, inhalation, BID, Demarco Gonzalez MD     List of current healthcare providers:  Patient Care Team:  Demarco Gonzalez MD as PCP - General  Demarco Gonzalez MD as PCP - [employer] PCP  Demarco Gonzalez MD as PCP - [facility] [employer]vantage PCP     HRA:  Over the past 2 weeks, how often have you been bothered by any of the following problems?  Little interest or pleasure in doing things: Not at all  Feeling down, depressed, or hopeless: Not at all  Patient Health Questionnaire-2 Score: 0    Steadi Fall Risk  One or more falls in the last year? No  How many Times?    Was the patient injured in the fall?    Has trouble stepping onto curb? No  Advised to use a cane or walker to get around safely? No  Often has to rush to toilet? No  Feels unsteady when walking? No  Has lost some feeling in feet? No  Often feels sad or depressed? No  Steadies self on furniture while walking at home? No  Takes medicine that makes them feel lightheaded or more  tired than usual? No  Worried about Falling? No  Takes medicine to sleep or improve mood? No  Needs to push with hands when rising from a chair? No                                          Assessment/Plan :  Problem List Items Addressed This Visit       COPD (chronic obstructive pulmonary disease) (Multi)    Familial combined hyperlipidemia     Continue flax seed oil - currently taking 2 pills after lunch and dinner    Cholesterol panel trending in right direction    Ordered repatha injection at home; start use if insurance covers (if not, will try statin)         HTN (hypertension) - Primary     BP stable     Continue nadolol         UTI (urinary tract infection)    Atrial fibrillation with RVR (Multi)     Taking eliquis once/ day - increase to BID          Infrarenal abdominal aortic aneurysm (AAA) without rupture (CMS-HCC)     Will continue to monitor with repeat imaging         Pulmonary nodule     Repeat CT scan in May (given h/o tobacco use)          Other Visit Diagnoses       Encounter for annual wellness visit (AWV) in Medicare patient        Relevant Orders    POCT UA (nonautomated) manually resulted (Completed)    POCT occult blood stool manually resulted (Completed)    ECG 12 lead (Clinic Performed)          The following health maintenance schedule was reviewed with the patient and provided in printed form in the after visit summary:  Health Maintenance   Topic Date Due    Bone Density Scan  Never done    Hepatitis A Vaccines (1 of 2 - Risk 2-dose series) Never done    DTaP/Tdap/Td Vaccines (1 - Tdap) Never done    Zoster Vaccines (1 of 2) Never done    RSV High Risk: (Elderly (60+) or Pregnant Population) (1 - Risk 60-74 years 1-dose series) Never done    Hepatitis B Vaccines (1 of 3 - Risk 3-dose series) Never done    Pneumococcal Vaccine: 65+ Years (2 of 2 - PCV) 09/07/2017    Mammogram  03/24/2024    Diabetes Screening  05/24/2025    TSH Level  11/21/2025    Medicare Annual Wellness Visit (AWV)   12/03/2025    Colorectal Cancer Screening  03/13/2028    Lipid Panel  11/21/2029    Influenza Vaccine  Completed    Hepatitis C Screening  Completed    COVID-19 Vaccine  Completed    HIB Vaccines  Aged Out    IPV Vaccines  Aged Out    Meningococcal Vaccine  Aged Out    Rotavirus Vaccines  Aged Out    HPV Vaccines  Aged Out    Irritable Bowel Syndrome  Discontinued       Advance Care Planning   Not discussed at this visit.              Orders Placed This Encounter   Procedures    POCT UA (nonautomated) manually resulted     Order Specific Question:   Release result to MyChart     Answer:   Immediate [1]    POCT occult blood stool manually resulted     Order Specific Question:   Release result to MyChart     Answer:   Immediate [1]    ECG 12 lead (Clinic Performed)     Order Specific Question:   Reason for Exam:     Answer:   Physical     Discussed pneumonia booster.     Continue current medications as listed  Follow up.

## 2024-12-06 DIAGNOSIS — J44.9 CHRONIC OBSTRUCTIVE PULMONARY DISEASE, UNSPECIFIED: ICD-10-CM

## 2024-12-07 RX ORDER — IPRATROPIUM BROMIDE AND ALBUTEROL SULFATE 2.5; .5 MG/3ML; MG/3ML
3 SOLUTION RESPIRATORY (INHALATION)
Qty: 270 ML | Refills: 2 | Status: SHIPPED | OUTPATIENT
Start: 2024-12-07

## 2025-01-02 ENCOUNTER — APPOINTMENT (OUTPATIENT)
Dept: PHARMACY | Facility: HOSPITAL | Age: 75
End: 2025-01-02
Payer: COMMERCIAL

## 2025-01-02 DIAGNOSIS — I65.22 STENOSIS OF LEFT CAROTID ARTERY: ICD-10-CM

## 2025-01-02 DIAGNOSIS — E78.49 FAMILIAL COMBINED HYPERLIPIDEMIA: ICD-10-CM

## 2025-01-02 DIAGNOSIS — I48.91 ATRIAL FIBRILLATION WITH RVR (MULTI): ICD-10-CM

## 2025-01-02 DIAGNOSIS — I48.91 ATRIAL FIBRILLATION WITH RVR (MULTI): Primary | ICD-10-CM

## 2025-01-02 DIAGNOSIS — I73.9 PAD (PERIPHERAL ARTERY DISEASE) (CMS-HCC): ICD-10-CM

## 2025-01-02 RX ORDER — EVOLOCUMAB 140 MG/ML
140 INJECTION, SOLUTION SUBCUTANEOUS
Qty: 6 ML | Refills: 3 | Status: SHIPPED | OUTPATIENT
Start: 2025-01-02

## 2025-01-02 NOTE — Clinical Note
Tano Laura,  Today I spoke with Svitlana about her Repatha, Eliquis and our cost assistance program. Patient states she is doing well on anticoagulation therapy and Repatha. Patient reports adherence, no adverse effects, and denies s/s of bleeding. Plan to submit PAP application once income documentation is received and follow up in 1 month. Thank you!

## 2025-01-02 NOTE — PROGRESS NOTES
Pharmacist Clinic: Cardiology Management    Svitlana Perry is a 74 y.o. female was referred to Clinical Pharmacy Team for cholesterol and anticoagulation management.     Referring Provider: Mike Laura, DO    THIS IS A NEW PATIENT APPOINTMENT. PATIENT WILL BE ESTABLISHING CARE WITH CLINICAL PHARMACY.    Appointment was completed by Svitlana who was reached at primary number.    Allergies Reviewed? Yes    No Known Allergies    No past medical history on file.    Current Outpatient Medications on File Prior to Visit   Medication Sig Dispense Refill    albuterol 90 mcg/actuation inhaler Inhale 2 puffs every 4 hours if needed.      apixaban (Eliquis) 5 mg tablet Take 1 tablet (5 mg) by mouth 2 times a day. 180 tablet 3    aspirin 81 mg EC tablet Take 1 tablet (81 mg) by mouth once daily. 30 tablet 11    evolocumab (Repatha SureClick) 140 mg/mL injection Inject 1 mL (140 mg) under the skin every 14 (fourteen) days. 1 mL 3    flaxseed oiL 1,000 mg capsule Take 1 capsule (1,000 mg) by mouth once daily. 90 capsule 3    fluticasone furoate-vilanteroL (Breo Ellipta) 100-25 mcg/dose inhaler Inhale 1 puff once daily. 60 each 3    fluticasone furoate-vilanteroL (Breo Ellipta) 100-25 mcg/dose inhaler Inhale 1 puff once daily. 60 each 3    fluticasone-umeclidin-vilanter (Trelegy Ellipta) 100-62.5-25 mcg blister with device Inhale 1 puff once daily. 60 each 3    folic acid (Folvite) 1 mg tablet Take 1 tablet (1 mg) by mouth once daily. 30 tablet 11    ipratropium-albuteroL (Duo-Neb) 0.5-2.5 mg/3 mL nebulizer solution Take 3 mL by nebulization 3 times a day. 270 mL 2    magnesium oxide 400 mg magnesium capsule Take 1 capsule (400 mg) by mouth once daily at bedtime. 90 capsule 3    nadolol (Corgard) 20 mg tablet Take 1 tablet (20 mg) by mouth once daily. 90 tablet 3    spironolactone (Aldactone) 25 mg tablet TAKE 1 TABLET BY MOUTH EVERY DAY 90 tablet 3    potassium chloride CR (Klor-Con) 10 mEq ER tablet Take 1 tablet (10  "mEq) by mouth once daily. Do not crush, chew, or split. (Patient not taking: Reported on 1/2/2025) 30 tablet 11    solifenacin (Vesicare) 5 mg tablet Take 1 tablet (5 mg) by mouth once daily. Swallow tablet whole; do not crush, chew, or split. (Patient not taking: Reported on 1/2/2025) 30 tablet 11    thiamine (Vitamin B-1) 100 mg tablet Take 1 tablet (100 mg) by mouth once daily. (Patient not taking: Reported on 1/2/2025) 30 tablet 11     Current Facility-Administered Medications on File Prior to Visit   Medication Dose Route Frequency Provider Last Rate Last Admin    evolocumab (Repatha SureClick) injection 140 mg  140 mg subcutaneous Once Mike Laura DO        fluticasone-umeclidin-vilanter (TRELEGY-ELLIPTA) 100-62.5-25 mcg 100-62.5-25 mcg/puff inhaler 1 puff  1 puff inhalation BID Demarco Gonzalez MD             RELEVANT LAB RESULTS:  Lab Results   Component Value Date    BILITOT 0.4 11/21/2024    CALCIUM 9.4 11/21/2024    CO2 27 11/21/2024     11/21/2024    CREATININE 0.71 11/21/2024    GLUCOSE 38 (LL) 11/21/2024    ALKPHOS 83 11/21/2024    K 5.0 11/21/2024    PROT 6.4 11/21/2024     11/21/2024    AST 16 11/21/2024    ALT 13 11/21/2024    BUN 19 11/21/2024    ANIONGAP 15 11/21/2024    MG 2.00 06/01/2024    PHOS 3.1 06/01/2024    ALBUMIN 4.5 11/21/2024    AMYLASE 30 10/19/2017    LIPASE 9 09/14/2023    GFRF >90 09/18/2023    GFRMALE CANCELED 09/15/2023     Lab Results   Component Value Date    TRIG 108 11/21/2024    CHOL 210 (H) 11/21/2024    LDLCALC 142 (H) 11/21/2024    HDL 46.6 11/21/2024     No results found for: \"BMCBC\", \"CBCDIF\"     PHARMACEUTICAL ASSESSMENT:    MEDICATION RECONCILIATION    Home Pharmacy Reviewed? Yes, describe: [facility]; [place], [place]    Drug Interactions? No    Medication Documentation Review Audit       Reviewed by Madison Camejo CMA (Medical Assistant) on 12/02/24 at 1209      Medication Order Taking? Sig Documenting Provider Last Dose Status   albuterol 90 " mcg/actuation inhaler [id number] No Inhale 2 puffs every 4 hours if needed. Historical Provider, MD Taking Active   apixaban (Eliquis) 5 mg tablet [id number]  Take 1 tablet (5 mg) by mouth 2 times a day.   Patient taking differently: Take 1 tablet (5 mg) by mouth 2 times a day. Pt taking once a day    Mike Laura DO  Active   aspirin 81 mg EC tablet [id number]  Take 1 tablet (81 mg) by mouth once daily. Christal Mesa, APRN-CNP  Active   evolocumab (Repatha SureClick) injection 140 mg [id number]   Mike Laura DO  Active   flaxseed oiL 1,000 mg capsule [id number]  Take 1 capsule (1,000 mg) by mouth once daily. Demarco Gonzalez MD  Active   fluticasone furoate-vilanteroL (Breo Ellipta) 100-25 mcg/dose inhaler [id number]  Inhale 1 puff once daily. Demarco Gonzalez MD  Active   fluticasone-umeclidin-vilanter (TRELEGY-ELLIPTA) 100-62.5-25 mcg 100-62.5-25 mcg/puff inhaler 1 puff [id number]   Demarco Gonzalez MD  Active   folic acid (Folvite) 1 mg tablet [id number] No Take 1 tablet (1 mg) by mouth once daily. Demarco Gonzalez MD Taking Active   ipratropium-albuteroL (Duo-Neb) 0.5-2.5 mg/3 mL nebulizer solution [id number] No USE 1 UNIT DOSE VIA NEBULIZER THREE TIMES DAILY. Demarco Gonzalez MD Taking Active   magnesium oxide 400 mg magnesium capsule [id number] No Take 1 capsule (400 mg) by mouth once daily at bedtime. Demarco Gonzalez MD Taking Active   nadolol (Corgard) 20 mg tablet [id number]  Take 1 tablet (20 mg) by mouth once daily. Mike Laura DO  Active   potassium chloride CR (Klor-Con) 10 mEq ER tablet [id number] No Take 1 tablet (10 mEq) by mouth once daily. Do not crush, chew, or split. Demarco Gonzalez MD Taking Active   solifenacin (Vesicare) 5 mg tablet [id number]  Take 1 tablet (5 mg) by mouth once daily. Swallow tablet whole; do not crush, chew, or split.   Patient not taking: Reported on 11/12/2024    Demarco Gonzalez MD  Active   spironolactone (Aldactone) 25 mg tablet [id number]  TAKE 1 TABLET BY MOUTH EVERY DAY  Mike Laura DO  Active   thiamine (Vitamin B-1) 100 mg tablet [id number]  Take 1 tablet (100 mg) by mouth once daily.   Patient not taking: Reported on 11/12/2024    Demarco Gonzalez MD  Active                    DISEASE MANAGEMENT ASSESSMENT:     ANTICOAGULATION ASSESSMENT    The 10-year ASCVD risk score (Tomasz GIRALDO, et al., 2019) is: 27.9%    Values used to calculate the score:      Age: 74 years      Sex: Female      Is Non- : No      Diabetic: No      Tobacco smoker: Yes      Systolic Blood Pressure: 140 mmHg      Is BP treated: Yes      HDL Cholesterol: 46.6 mg/dL      Total Cholesterol: 210 mg/dL    DIAGNOSIS: prevention of nonvalvular atrial fibrilliation stroke and systemic embolism  - Patient is projected to be on anticoagulation long term  - [id number] Score: [4] (only included if diagnosis is atrial fibrillation)   Age: [<65 (0)] [65-74 (+1)] [> 75 (+2)]: 1  Sex: [Male/Female (+1)]: 1  CHF history: [No/Yes(+1)]: 0  Hypertension history: [No/Yes(+1)]: 1  Stroke/TIA/thromboembolism history: [No/Yes(+2)]: 0  Vascular disease history (prior MI, peripheral artery disease, aortic plaque): [No/Yes(+1)]: 1  Diabetes history: [No/Yes(+1)]: 0    CURRENT PHARMACOTHERAPY:    Eliquis 5mg twice daily  73yo  57.2kg  Scr: 0.71    RELEVANT PAST MEDICAL HISTORY:   Afib, HTN, HLD, PAD    Affordability/Accessibility:  PAP screen  Adherence/Organization: reports adherence   Adverse Reactions: reports adherence  Recent Hospitalizations: none  Recent Falls/Trauma: none reported  Changes in Tobacco or Alcohol Intake:   Tobacco: 1 ppd   Alcohol: does not use    EDUCATION/COUNSELING:   - Counseled patient on MOA, expectations, duration of therapy, contraindications, administration, and monitoring parameters  - Counseled patient of side effects that are indicative of bleeding such as dark tarry stool, unexplainable bruising, or vomiting up a coffee ground like substance     HYPERCHOLESTEROLEMIA  ASSESSMENT    RECENT LIPID PANEL (DATE): 11/21/2024  Lab Results   Component Value Date    TRIG 108 11/21/2024    CHOL 210 (H) 11/21/2024    LDLCALC 142 (H) 11/21/2024    HDL 46.6 11/21/2024          ASCVD SCORE: The 10-year ASCVD risk score (Tomasz GIRALDO, et al., 2019) is: 27.9%    Values used to calculate the score:      Age: 74 years      Sex: Female      Is Non- : No      Diabetic: No      Tobacco smoker: Yes      Systolic Blood Pressure: 140 mmHg      Is BP treated: Yes      HDL Cholesterol: 46.6 mg/dL      Total Cholesterol: 210 mg/dL  Coronary Heart Disease (MI, angina, coronary artery stenosis): Yes   History of ischemic stroke? No   History of carotid artery stenosis? Yes   Peripheral artery disease? Yes   ASCVD RISK FACTORS:    CKD? No   Diabetes? No   HTN? Yes   Persistently elevated LDL? Yes   Elevated triglycerides? No   Inflammatory diseases (rheumatoid arthritis, psoriasis, HIV)? Yes    CURRENT PHARMACOTHERAPY  - Statin? No- worsening liver dysfunction when on statin  - Ezetimibe? No  - PCSK9-I? Yes, describe: Repatha 140mg every 14 days  - Other lipid lowering agents? No      RELEVANT PAST MEDICAL HISTORY:   Carotid stenosis, HLD, HTN, PAD    ASSESSMENT    Affordability/Accessibility:  PAP  Adherence/Organization: reports adherence   Adverse Effects: patient reports occasional runny nose, otherwise well tolerated  Recent Hospitalizations: No  Diet: potato soup, no canned bought food, watches salt intake, drinks water, occasional pepsi/coke, omelet and toast   Exercise: No- patient is member of rec center and plans to work out with machines, she was previously doing water aerobics until it got cold outside  Tobacco Use: Yes, describe: 1 ppd  Alcohol Use: No  Next Lipid Panel: 6 months      EDUCATION/COUNSELING:  - Counseled patient on MOA, expectations, side effects, duration of therapy, contraindications, administration, and monitoring parameters  - Answered all patient  questions and concerns       DISCUSSION/NOTES:   Today was an initial visit to establish with clinical pharmacy. Patient medications and allergies were reviewed and updated.   Patient states she is doing well on anticoagulation therapy. Patient reports adherence, no adverse effects, and denies s/s of bleeding.   Patient is currently on Repatha 140mg every 14 days. Patient reports tolerating medication well, she states she gets an occasional runny nose but nothing bothersome. Patient states she has a pretty healthy diet, she watches her salt intake and drinks primarily water. Patient is not currently exercising but plans to work out with machines at her local [employer] center. Patient currently smokes 1ppd of cigarettes.   Patient was screened for  PAP. Plan to submit PAP application once income documentation is received.     ASSESSMENT:     Patient Assistance Program (PAP)    Application for program to be submitted for the following medications: Repatha, Eliquis     [facility] Permanent Address: [place]   Prescription Insurance:   Yes   Members of Household: 1   Files Taxes: Yes       Patient will be faxing financial information to pharmacist directly at [phone].    Patient verbally reports monthly or yearly income which is less than 400% federal poverty level    Patient aware this process may take up to 6 weeks.     If approved medication must be filled through [facility] and MEDICATION WILL BE MAILED TO PATIENT.       Assessment/Plan   Problem List Items Addressed This Visit       Carotid stenosis    Relevant Orders    Referral to Clinical Pharmacy    Familial combined hyperlipidemia     LDL continues to be above goal. Plan to continue Repatha 140mg injection every 14 days.          Relevant Orders    Referral to Clinical Pharmacy    PAD (peripheral artery disease) (CMS-[facility])    Relevant Orders    Referral to Clinical Pharmacy    Atrial fibrillation with RVR (Multi)     Patient age, weight and renal  function appropriate to continue Eliquis 5mg twice daily.         Relevant Orders    Referral to Clinical Pharmacy         RECOMMENDATIONS/PLAN:    CONTINUE  Eliquis 5mg twice daily  Repatha 140mg every 14 days as prescribed    Last Appnt with Referring Provider: 11/12/2024  Next Appnt with Referring Provider: 03/13/2025  Clinical Pharmacist follow up: 1 month  VAF/Application Expiration: pending  Type of Encounter: Tati Kern PharmD    Verbal consent to manage patient's drug therapy was obtained from the patient . They were informed they may decline to participate or withdraw from participation in pharmacy services at any time.    Continue all meds under the continuation of care with the referring provider and clinical pharmacy team.

## 2025-01-03 PROCEDURE — RXMED WILLOW AMBULATORY MEDICATION CHARGE

## 2025-01-06 ENCOUNTER — TELEPHONE (OUTPATIENT)
Dept: PHARMACY | Facility: HOSPITAL | Age: 75
End: 2025-01-06
Payer: COMMERCIAL

## 2025-01-06 NOTE — TELEPHONE ENCOUNTER
Patient Assistance Program Approval:     We are pleased to inform you that your application for assistance has been approved.     This approval is valid through 01/03/2026  as long as the following criteria continue to be satisfied:     Your medication (Repatha/Eliquis) remains covered under your current insurance plan.   Your prescriber does not discontinue therapy.   You do not seek reimbursement from any other private or government-funded programs for the  medication.    Under this program, the pharmacy will first bill your insurance plan for your indemnified specified medication. The [employer] Assistance Fund will then offset your copay balance, so that your out-of pocket expense for your specialty medication will be $0.00.    Marychuy Kern, PattieD

## 2025-01-07 ENCOUNTER — PHARMACY VISIT (OUTPATIENT)
Dept: PHARMACY | Facility: CLINIC | Age: 75
End: 2025-01-07
Payer: MEDICARE

## 2025-01-27 NOTE — PROGRESS NOTES
Pharmacist Clinic: Cardiology Management    Svitlana Perry is a 75 y.o. female was referred to Clinical Pharmacy Team for cholesterol and anticoagulation management.     Referring Provider: Mike Laura, DO    THIS IS A FOLLOW UP PATIENT APPOINTMENT. AT LAST VISIT ON 01/02/2025 WITH PHARMACIST (Marychuy Kern).    Appointment was completed by Svitlana who was reached at primary number.    REVIEW OF LAST APPT  Today was an initial visit to establish with clinical pharmacy. Patient medications and allergies were reviewed and updated.   Patient states she is doing well on anticoagulation therapy. Patient reports adherence, no adverse effects, and denies s/s of bleeding.   Patient is currently on Repatha 140mg every 14 days. Patient reports tolerating medication well, she states she gets an occasional runny nose but nothing bothersome. Patient states she has a pretty healthy diet, she watches her salt intake and drinks primarily water. Patient is not currently exercising but plans to work out with machines at her local [employer] center. Patient currently smokes 1ppd of cigarettes.   Patient was screened for  PAP. Plan to submit PAP application once income documentation is received.     Allergies Reviewed? No    No Known Allergies    No past medical history on file.    Current Outpatient Medications on File Prior to Visit   Medication Sig Dispense Refill    albuterol 90 mcg/actuation inhaler Inhale 2 puffs every 4 hours if needed.      apixaban (Eliquis) 5 mg tablet Take 1 tablet (5 mg) by mouth 2 times a day. 180 tablet 3    aspirin 81 mg EC tablet Take 1 tablet (81 mg) by mouth once daily. 30 tablet 11    flaxseed oiL 1,000 mg capsule Take 1 capsule (1,000 mg) by mouth once daily. 90 capsule 3    fluticasone furoate-vilanteroL (Breo Ellipta) 100-25 mcg/dose inhaler Inhale 1 puff once daily. 60 each 3    fluticasone furoate-vilanteroL (Breo Ellipta) 100-25 mcg/dose inhaler Inhale 1 puff once daily. 60 each 3     fluticasone-umeclidin-vilanter (Trelegy Ellipta) 100-62.5-25 mcg blister with device Inhale 1 puff once daily. 60 each 3    folic acid (Folvite) 1 mg tablet Take 1 tablet (1 mg) by mouth once daily. 30 tablet 11    ipratropium-albuteroL (Duo-Neb) 0.5-2.5 mg/3 mL nebulizer solution Take 3 mL by nebulization 3 times a day. 270 mL 2    magnesium oxide 400 mg magnesium capsule Take 1 capsule (400 mg) by mouth once daily at bedtime. 90 capsule 3    nadolol (Corgard) 20 mg tablet Take 1 tablet (20 mg) by mouth once daily. 90 tablet 3    potassium chloride CR (Klor-Con) 10 mEq ER tablet Take 1 tablet (10 mEq) by mouth once daily. Do not crush, chew, or split. (Patient not taking: Reported on 1/2/2025) 30 tablet 11    solifenacin (Vesicare) 5 mg tablet Take 1 tablet (5 mg) by mouth once daily. Swallow tablet whole; do not crush, chew, or split. (Patient not taking: Reported on 1/2/2025) 30 tablet 11    spironolactone (Aldactone) 25 mg tablet TAKE 1 TABLET BY MOUTH EVERY DAY 90 tablet 3    thiamine (Vitamin B-1) 100 mg tablet Take 1 tablet (100 mg) by mouth once daily. (Patient not taking: Reported on 1/2/2025) 30 tablet 11    [DISCONTINUED] evolocumab (Repatha SureClick) 140 mg/mL injection Inject 1 mL (140 mg) under the skin every 14 (fourteen) days. 6 mL 3     Current Facility-Administered Medications on File Prior to Visit   Medication Dose Route Frequency Provider Last Rate Last Admin    evolocumab (Repatha SureClick) injection 140 mg  140 mg subcutaneous Once Mike Laura,         fluticasone-umeclidin-vilanter (TRELEGY-ELLIPTA) 100-62.5-25 mcg 100-62.5-25 mcg/puff inhaler 1 puff  1 puff inhalation BID Demarco Gonzalez MD             RELEVANT LAB RESULTS:  Lab Results   Component Value Date    BILITOT 0.4 11/21/2024    CALCIUM 9.4 11/21/2024    CO2 27 11/21/2024     11/21/2024    CREATININE 0.71 11/21/2024    GLUCOSE 38 (LL) 11/21/2024    ALKPHOS 83 11/21/2024    K 5.0 11/21/2024    PROT 6.4 11/21/2024    NA  "140 11/21/2024    AST 16 11/21/2024    ALT 13 11/21/2024    BUN 19 11/21/2024    ANIONGAP 15 11/21/2024    MG 2.00 06/01/2024    PHOS 3.1 06/01/2024    ALBUMIN 4.5 11/21/2024    AMYLASE 30 10/19/2017    LIPASE 9 09/14/2023    GFRF >90 09/18/2023    GFRMALE CANCELED 09/15/2023     Lab Results   Component Value Date    TRIG 108 11/21/2024    CHOL 210 (H) 11/21/2024    LDLCALC 142 (H) 11/21/2024    HDL 46.6 11/21/2024     No results found for: \"BMCBC\", \"CBCDIF\"     PHARMACEUTICAL ASSESSMENT:    MEDICATION RECONCILIATION    Drug Interactions? No    Medication Documentation Review Audit       Reviewed by Madison Camejo CMA (Medical Assistant) on 12/02/24 at 1209      Medication Order Taking? Sig Documenting Provider Last Dose Status   albuterol 90 mcg/actuation inhaler [id number] No Inhale 2 puffs every 4 hours if needed. Historical Provider, MD Taking Active   apixaban (Eliquis) 5 mg tablet [id number]  Take 1 tablet (5 mg) by mouth 2 times a day.   Patient taking differently: Take 1 tablet (5 mg) by mouth 2 times a day. Pt taking once a day    Mike Laura DO  Active   aspirin 81 mg EC tablet [id number]  Take 1 tablet (81 mg) by mouth once daily. Christal Mesa, APRN-CNP  Active   evolocumab (Repatha SureClick) injection 140 mg [id number]   Mike Laura DO  Active   flaxseed oiL 1,000 mg capsule [id number]  Take 1 capsule (1,000 mg) by mouth once daily. Demarco Gonzalez MD  Active   fluticasone furoate-vilanteroL (Breo Ellipta) 100-25 mcg/dose inhaler [id number]  Inhale 1 puff once daily. Demarco Gonzalez MD  Active   fluticasone-umeclidin-vilanter (TRELEGY-ELLIPTA) 100-62.5-25 mcg 100-62.5-25 mcg/puff inhaler 1 puff [id number]   Demarco Gonzalez MD  Active   folic acid (Folvite) 1 mg tablet [id number] No Take 1 tablet (1 mg) by mouth once daily. Demarco Gonzalez MD Taking Active   ipratropium-albuteroL (Duo-Neb) 0.5-2.5 mg/3 mL nebulizer solution [id number] No USE 1 UNIT DOSE VIA NEBULIZER THREE TIMES DAILY. Demarco BANG" MD Lisa Taking Active   magnesium oxide 400 mg magnesium capsule [id number] No Take 1 capsule (400 mg) by mouth once daily at bedtime. Demarco Gonzalez MD Taking Active   nadolol (Corgard) 20 mg tablet [id number]  Take 1 tablet (20 mg) by mouth once daily. Mike Laura, DO  Active   potassium chloride CR (Klor-Con) 10 mEq ER tablet [id number] No Take 1 tablet (10 mEq) by mouth once daily. Do not crush, chew, or split. Demarco Gonzalez MD Taking Active   solifenacin (Vesicare) 5 mg tablet [id number]  Take 1 tablet (5 mg) by mouth once daily. Swallow tablet whole; do not crush, chew, or split.   Patient not taking: Reported on 11/12/2024    Demarco Gonzalez MD  Active   spironolactone (Aldactone) 25 mg tablet [id number]  TAKE 1 TABLET BY MOUTH EVERY DAY Mike Laura DO  Active   thiamine (Vitamin B-1) 100 mg tablet [id number]  Take 1 tablet (100 mg) by mouth once daily.   Patient not taking: Reported on 11/12/2024    Demarco Gonzalez MD  Active                    DISEASE MANAGEMENT ASSESSMENT:     ANTICOAGULATION ASSESSMENT    The 10-year ASCVD risk score (Tomasz DK, et al., 2019) is: 29.8%    Values used to calculate the score:      Age: 75 years      Sex: Female      Is Non- : No      Diabetic: No      Tobacco smoker: Yes      Systolic Blood Pressure: 140 mmHg      Is BP treated: Yes      HDL Cholesterol: 46.6 mg/dL      Total Cholesterol: 210 mg/dL    DIAGNOSIS: prevention of nonvalvular atrial fibrilliation stroke and systemic embolism  - Patient is projected to be on anticoagulation long term  - [id number] Score: [4] (only included if diagnosis is atrial fibrillation)   Age: [<65 (0)] [65-74 (+1)] [> 75 (+2)]: 1  Sex: [Male/Female (+1)]: 1  CHF history: [No/Yes(+1)]: 0  Hypertension history: [No/Yes(+1)]: 1  Stroke/TIA/thromboembolism history: [No/Yes(+2)]: 0  Vascular disease history (prior MI, peripheral artery disease, aortic plaque): [No/Yes(+1)]: 1  Diabetes history: [No/Yes(+1)]:  0    CURRENT PHARMACOTHERAPY:    Eliquis 5mg twice daily  73yo  57.2kg  Scr: 0.71 (11/21/2024)    RELEVANT PAST MEDICAL HISTORY:   Afib, HTN, HLD, PAD    Affordability/Accessibility:  PAP   Adherence/Organization: reports adherence   Adverse Reactions: reports adherence  Recent Hospitalizations: none  Recent Falls/Trauma: none reported  Changes in Tobacco or Alcohol Intake:   Tobacco: 1 ppd   Alcohol: does not use    EDUCATION/COUNSELING:   - Counseled patient on MOA, expectations, duration of therapy, contraindications, administration, and monitoring parameters  - Counseled patient of side effects that are indicative of bleeding such as dark tarry stool, unexplainable bruising, or vomiting up a coffee ground like substance     HYPERCHOLESTEROLEMIA ASSESSMENT    RECENT LIPID PANEL (DATE): 11/21/2024  Lab Results   Component Value Date    TRIG 108 11/21/2024    CHOL 210 (H) 11/21/2024    LDLCALC 142 (H) 11/21/2024    HDL 46.6 11/21/2024          ASCVD SCORE: The 10-year ASCVD risk score (Tomasz DK, et al., 2019) is: 29.8%    Values used to calculate the score:      Age: 75 years      Sex: Female      Is Non- : No      Diabetic: No      Tobacco smoker: Yes      Systolic Blood Pressure: 140 mmHg      Is BP treated: Yes      HDL Cholesterol: 46.6 mg/dL      Total Cholesterol: 210 mg/dL  Coronary Heart Disease (MI, angina, coronary artery stenosis): Yes   History of ischemic stroke? No   History of carotid artery stenosis? Yes   Peripheral artery disease? Yes   ASCVD RISK FACTORS:    CKD? No   Diabetes? No   HTN? Yes   Persistently elevated LDL? Yes   Elevated triglycerides? No   Inflammatory diseases (rheumatoid arthritis, psoriasis, HIV)? Yes    CURRENT PHARMACOTHERAPY  - Statin? No- worsening liver dysfunction when on statin  - Ezetimibe? No  - PCSK9-I? Yes, describe: Repatha 140mg every 14 days  - Other lipid lowering agents? No      RELEVANT PAST MEDICAL HISTORY:   Carotid stenosis, HLD,  HTN, PAD    ASSESSMENT    Affordability/Accessibility:  PAP  Adherence/Organization: reports adherence   Adverse Effects: patient reports occasional runny nose, otherwise well tolerated  Recent Hospitalizations: No  Diet: potato soup, no canned bought food, watches salt intake, drinks water, occasional pepsi/coke, omelet and toast   Exercise: No- patient is member of [facility] center and plans to work out with machines, she was previously doing water aerobics until it got cold outside  Tobacco Use: Yes, describe: 1 ppd  Alcohol Use: No  Next Lipid Panel: 6 months      EDUCATION/COUNSELING:  - Counseled patient on MOA, expectations, side effects, duration of therapy, contraindications, administration, and monitoring parameters  - Answered all patient questions and concerns       DISCUSSION/NOTES:   Patient states she is doing well on anticoagulation therapy. Patient reports adherence, no adverse effects, and denies s/s of bleeding.   Patient is currently on Repatha 140mg every 14 days. Patient reports tolerating medication well, she states she gets an occasional runny nose but nothing bothersome. Patient diet remains unchanged.   Patient states she received a letter from her insurance company that they were no longer going to contribute towards the cost of Repatha. Patient has called insurance and is waiting on an update. Upon running test claim, insurance continues to contribute toward Repatha for now. Will continue to follow up on this coverage matter.   Patient is due for refill for Repatha- sent new rx to [facility]- patient requests autofill.  Svitlana plans to complete lipid panel in February plan to follow up in 1 month to assess results.    ASSESSMENT:    Assessment/Plan   Problem List Items Addressed This Visit       Carotid stenosis    Relevant Orders    Referral to Clinical Pharmacy    Familial combined hyperlipidemia     LDL continues to be above goal. Plan to continue Repatha 140mg injection every 14 days.              Relevant Medications    evolocumab (Repatha SureClick) 140 mg/mL injection    Other Relevant Orders    Referral to Clinical Pharmacy    PAD (peripheral artery disease) (CMS-HCC)    Relevant Orders    Referral to Clinical Pharmacy    Atrial fibrillation with RVR (Multi)     Patient age, weight and renal function appropriate to continue Eliquis 5mg twice daily.         Relevant Orders    Referral to Clinical Pharmacy           RECOMMENDATIONS/PLAN:    CONTINUE  Eliquis 5mg twice daily  Repatha 140mg every 14 days as prescribed    Last Appnt with Referring Provider: 11/12/2024  Next Appnt with Referring Provider: 03/13/2025  Clinical Pharmacist follow up: 1 month  VAF/Application Expiration: Yes; 01/03/2026  Type of Encounter: Pattie AndersenD    Verbal consent to manage patient's drug therapy was obtained from the patient . They were informed they may decline to participate or withdraw from participation in pharmacy services at any time.    Continue all meds under the continuation of care with the referring provider and clinical pharmacy team.

## 2025-01-30 ENCOUNTER — APPOINTMENT (OUTPATIENT)
Dept: PHARMACY | Facility: HOSPITAL | Age: 75
End: 2025-01-30
Payer: COMMERCIAL

## 2025-01-30 DIAGNOSIS — I73.9 PAD (PERIPHERAL ARTERY DISEASE) (CMS-HCC): ICD-10-CM

## 2025-01-30 DIAGNOSIS — I48.91 ATRIAL FIBRILLATION WITH RVR (MULTI): ICD-10-CM

## 2025-01-30 DIAGNOSIS — E78.49 FAMILIAL COMBINED HYPERLIPIDEMIA: ICD-10-CM

## 2025-01-30 DIAGNOSIS — I65.22 STENOSIS OF LEFT CAROTID ARTERY: ICD-10-CM

## 2025-01-30 PROCEDURE — RXMED WILLOW AMBULATORY MEDICATION CHARGE

## 2025-01-30 RX ORDER — EVOLOCUMAB 140 MG/ML
140 INJECTION, SOLUTION SUBCUTANEOUS
Qty: 2 ML | Refills: 11 | Status: SHIPPED | OUTPATIENT
Start: 2025-01-30

## 2025-01-30 NOTE — Clinical Note
Care Transition Team Assessment    Information Source  Orientation : Disoriented to Place  Information Given By: Patient    Readmission Evaluation  Is this a readmission?: No    Interdisciplinary Discharge Planning  Does Admitting Nurse Feel This Could be a Complex Discharge?: No  Primary Care Physician: Denver Miller  Lives with - Patient's Self Care Capacity: Other (Comments) (Lives at [facility] assisted [facility])  Patient or legal guardian wants to designate a caregiver (see row info): No  Support Systems: Children, Other (Comments) (Daughter and Caregivers at [facility])  Housing / Facility: Assisted Living Residence  Name of Care Facility: [facility]  Do You Take your Prescribed Medications Regularly: Yes  Able to Return to Previous ADL's: Other  Mobility Issues: Yes  Prior Services: Other (Comments) (Caregivers, currently on Hospice)  Patient Expects to be Discharged to:: Home  Assistance Needed: Yes  Durable Medical Equipment: Other - Specify    Discharge Preparedness  What are your discharge supports?: Child, Other (comment) (Care givers)  Prior Functional Level: Total Assist  Difficulity with ADLs: Other (Requires total care)  Difficulity with IADLs: Other (Requires total care)    Functional Assesment  Prior Functional Level: Total Assist    Finances  Prescription Coverage: Yes    Discharge Risks or Barriers  Discharge risks or barriers?: No    Anticipated Discharge Information  Anticipated discharge disposition: Home  Discharge Address: [facility]  Discharge Contact Phone Number: [phone] - [facility]         Tano Laura,  Today I spoke with Svitlana about her Eliquis and Repatha. Patient states she is doing well on both Eliquis and Repatha, reporting adherence and no adverse effects. Svitlana plans to complete lipid panel in February. Plan to follow up in 1 month to assess results. Thank you!

## 2025-01-31 ENCOUNTER — PHARMACY VISIT (OUTPATIENT)
Dept: PHARMACY | Facility: CLINIC | Age: 75
End: 2025-01-31
Payer: MEDICARE

## 2025-02-27 LAB
ALBUMIN SERPL-MCNC: 4.5 G/DL (ref 3.6–5.1)
ALP SERPL-CCNC: 84 U/L (ref 37–153)
ALT SERPL-CCNC: 10 U/L (ref 6–29)
ANION GAP SERPL CALCULATED.4IONS-SCNC: 10 MMOL/L (CALC) (ref 7–17)
AST SERPL-CCNC: 16 U/L (ref 10–35)
BILIRUB SERPL-MCNC: 0.5 MG/DL (ref 0.2–1.2)
BUN SERPL-MCNC: 17 MG/DL (ref 7–25)
CALCIUM SERPL-MCNC: 9.5 MG/DL (ref 8.6–10.4)
CHLORIDE SERPL-SCNC: 105 MMOL/L (ref 98–110)
CHOLEST SERPL-MCNC: 122 MG/DL
CHOLEST/HDLC SERPL: 2.3 (CALC)
CO2 SERPL-SCNC: 26 MMOL/L (ref 20–32)
CREAT SERPL-MCNC: 0.68 MG/DL (ref 0.6–1)
EGFRCR SERPLBLD CKD-EPI 2021: 92 ML/MIN/1.73M2
GLUCOSE SERPL-MCNC: 84 MG/DL (ref 65–99)
HDLC SERPL-MCNC: 52 MG/DL
LDLC SERPL CALC-MCNC: 55 MG/DL (CALC)
NONHDLC SERPL-MCNC: 70 MG/DL (CALC)
POTASSIUM SERPL-SCNC: 5.1 MMOL/L (ref 3.5–5.3)
PROT SERPL-MCNC: 6.8 G/DL (ref 6.1–8.1)
SODIUM SERPL-SCNC: 141 MMOL/L (ref 135–146)
TRIGL SERPL-MCNC: 74 MG/DL

## 2025-03-06 ENCOUNTER — APPOINTMENT (OUTPATIENT)
Dept: PHARMACY | Facility: HOSPITAL | Age: 75
End: 2025-03-06
Payer: COMMERCIAL

## 2025-03-06 ENCOUNTER — TELEPHONE (OUTPATIENT)
Dept: CARDIOLOGY | Facility: CLINIC | Age: 75
End: 2025-03-06

## 2025-03-06 NOTE — TELEPHONE ENCOUNTER
Patient called and left message that she needs to reschedule her appointment for 3/13/25 @ 1020 with Dr. Laura. Call placed to patient, no answer. Message left for patient to please call us back.

## 2025-03-13 ENCOUNTER — APPOINTMENT (OUTPATIENT)
Dept: CARDIOLOGY | Facility: CLINIC | Age: 75
End: 2025-03-13

## 2025-04-02 ENCOUNTER — APPOINTMENT (OUTPATIENT)
Dept: PHARMACY | Facility: HOSPITAL | Age: 75
End: 2025-04-02

## 2025-04-02 DIAGNOSIS — I73.9 PAD (PERIPHERAL ARTERY DISEASE) (CMS-HCC): ICD-10-CM

## 2025-04-02 DIAGNOSIS — E78.49 FAMILIAL COMBINED HYPERLIPIDEMIA: ICD-10-CM

## 2025-04-02 DIAGNOSIS — I48.91 ATRIAL FIBRILLATION WITH RVR (MULTI): ICD-10-CM

## 2025-04-02 DIAGNOSIS — I65.22 STENOSIS OF LEFT CAROTID ARTERY: ICD-10-CM

## 2025-04-02 PROCEDURE — RXMED WILLOW AMBULATORY MEDICATION CHARGE

## 2025-04-02 NOTE — PROGRESS NOTES
Pharmacist Clinic: Cardiology Management    Svitlana Perry is a 75 y.o. female was referred to Clinical Pharmacy Team for cholesterol and anticoagulation management.     Referring Provider: Mike Laura, DO    THIS IS A FOLLOW UP PATIENT APPOINTMENT. AT LAST VISIT ON 01/30/2025 WITH PHARMACIST (Marychuy Kern).    Appointment was completed by Svitlana who was reached at primary number.    REVIEW OF LAST APPT  Patient states she is doing well on anticoagulation therapy. Patient reports adherence, no adverse effects, and denies s/s of bleeding.   Patient is currently on Repatha 140mg every 14 days. Patient reports tolerating medication well, she states she gets an occasional runny nose but nothing bothersome. Patient diet remains unchanged.   Patient states she received a letter from her insurance company that they were no longer going to contribute towards the cost of Repatha. Patient has called insurance and is waiting on an update. Upon running test claim, insurance continues to contribute toward Repatha for now. Will continue to follow up on this coverage matter.   Patient is due for refill for Repatha- sent new rx to [facility]- patient requests autofill.  Svitlana plans to complete lipid panel in February plan to follow up in 1 month to assess results.    Allergies Reviewed? No    No Known Allergies    No past medical history on file.    Current Outpatient Medications on File Prior to Visit   Medication Sig Dispense Refill    albuterol 90 mcg/actuation inhaler Inhale 2 puffs every 4 hours if needed.      apixaban (Eliquis) 5 mg tablet Take 1 tablet (5 mg) by mouth 2 times a day. 180 tablet 3    aspirin 81 mg EC tablet Take 1 tablet (81 mg) by mouth once daily. 30 tablet 11    evolocumab (Repatha SureClick) 140 mg/mL injection Inject 1 mL (140 mg) under the skin every 14 (fourteen) days. 2 mL 11    flaxseed oiL 1,000 mg capsule Take 1 capsule (1,000 mg) by mouth once daily. 90 capsule 3    fluticasone  furoate-vilanteroL (Breo Ellipta) 100-25 mcg/dose inhaler Inhale 1 puff once daily. 60 each 3    fluticasone furoate-vilanteroL (Breo Ellipta) 100-25 mcg/dose inhaler Inhale 1 puff once daily. 60 each 3    fluticasone-umeclidin-vilanter (Trelegy Ellipta) 100-62.5-25 mcg blister with device Inhale 1 puff once daily. 60 each 3    folic acid (Folvite) 1 mg tablet Take 1 tablet (1 mg) by mouth once daily. 30 tablet 11    ipratropium-albuteroL (Duo-Neb) 0.5-2.5 mg/3 mL nebulizer solution Take 3 mL by nebulization 3 times a day. 270 mL 2    magnesium oxide 400 mg magnesium capsule Take 1 capsule (400 mg) by mouth once daily at bedtime. 90 capsule 3    nadolol (Corgard) 20 mg tablet Take 1 tablet (20 mg) by mouth once daily. 90 tablet 3    potassium chloride CR (Klor-Con) 10 mEq ER tablet Take 1 tablet (10 mEq) by mouth once daily. Do not crush, chew, or split. (Patient not taking: Reported on 1/2/2025) 30 tablet 11    solifenacin (Vesicare) 5 mg tablet Take 1 tablet (5 mg) by mouth once daily. Swallow tablet whole; do not crush, chew, or split. (Patient not taking: Reported on 1/2/2025) 30 tablet 11    spironolactone (Aldactone) 25 mg tablet TAKE 1 TABLET BY MOUTH EVERY DAY 90 tablet 3    thiamine (Vitamin B-1) 100 mg tablet Take 1 tablet (100 mg) by mouth once daily. (Patient not taking: Reported on 1/2/2025) 30 tablet 11     Current Facility-Administered Medications on File Prior to Visit   Medication Dose Route Frequency Provider Last Rate Last Admin    evolocumab (Repatha SureClick) injection 140 mg  140 mg subcutaneous Once Mike Laura,         fluticasone-umeclidin-vilanter (TRELEGY-ELLIPTA) 100-62.5-25 mcg 100-62.5-25 mcg/puff inhaler 1 puff  1 puff inhalation BID Demarco Gonzalez MD             RELEVANT LAB RESULTS:  Lab Results   Component Value Date    BILITOT 0.5 02/26/2025    CALCIUM 9.5 02/26/2025    CO2 26 02/26/2025     02/26/2025    CREATININE 0.68 02/26/2025    GLUCOSE 84 02/26/2025    ALKPHOS  "84 02/26/2025    K 5.1 02/26/2025    PROT 6.8 02/26/2025     02/26/2025    AST 16 02/26/2025    ALT 10 02/26/2025    BUN 17 02/26/2025    ANIONGAP 10 02/26/2025    MG 2.00 06/01/2024    PHOS 3.1 06/01/2024    ALBUMIN 4.5 02/26/2025    AMYLASE 30 10/19/2017    LIPASE 9 09/14/2023    GFRF >90 09/18/2023    GFRMALE CANCELED 09/15/2023     Lab Results   Component Value Date    TRIG 74 02/26/2025    CHOL 122 02/26/2025    LDLCALC 55 02/26/2025    HDL 52 02/26/2025     No results found for: \"BMCBC\", \"CBCDIF\"     PHARMACEUTICAL ASSESSMENT:    MEDICATION RECONCILIATION    Drug Interactions? No    Medication Documentation Review Audit       Reviewed by Madison Camejo CMA (Medical Assistant) on 12/02/24 at 1209      Medication Order Taking? Sig Documenting Provider Last Dose Status   albuterol 90 mcg/actuation inhaler [id number] No Inhale 2 puffs every 4 hours if needed. Historical Provider, MD Taking Active   apixaban (Eliquis) 5 mg tablet [id number]  Take 1 tablet (5 mg) by mouth 2 times a day.   Patient taking differently: Take 1 tablet (5 mg) by mouth 2 times a day. Pt taking once a day    Mike Laura DO  Active   aspirin 81 mg EC tablet [id number]  Take 1 tablet (81 mg) by mouth once daily. Christal Mesa, APRN-CNP  Active   evolocumab (Repatha SureClick) injection 140 mg [id number]   Mike Laura DO  Active   flaxseed oiL 1,000 mg capsule [id number]  Take 1 capsule (1,000 mg) by mouth once daily. Demarco Gonzalez MD  Active   fluticasone furoate-vilanteroL (Breo Ellipta) 100-25 mcg/dose inhaler [id number]  Inhale 1 puff once daily. Demarco Gonzalez MD  Active   fluticasone-umeclidin-vilanter (TRELEGY-ELLIPTA) 100-62.5-25 mcg 100-62.5-25 mcg/puff inhaler 1 puff [id number]   Demarco Gonzalez MD  Active   folic acid (Folvite) 1 mg tablet [id number] No Take 1 tablet (1 mg) by mouth once daily. Demarco Gonzalez MD Taking Active   ipratropium-albuteroL (Duo-Neb) 0.5-2.5 mg/3 mL nebulizer solution [id number] No USE 1 UNIT " DOSE VIA NEBULIZER THREE TIMES DAILY. Demarco Gonzalez MD Taking Active   magnesium oxide 400 mg magnesium capsule [id number] No Take 1 capsule (400 mg) by mouth once daily at bedtime. Demarco Gonzalez MD Taking Active   nadolol (Corgard) 20 mg tablet [id number]  Take 1 tablet (20 mg) by mouth once daily. Mike Laura,   Active   potassium chloride CR (Klor-Con) 10 mEq ER tablet [id number] No Take 1 tablet (10 mEq) by mouth once daily. Do not crush, chew, or split. Demarco Gonzalez MD Taking Active   solifenacin (Vesicare) 5 mg tablet [id number]  Take 1 tablet (5 mg) by mouth once daily. Swallow tablet whole; do not crush, chew, or split.   Patient not taking: Reported on 11/12/2024    Demarco Gonzalez MD  Active   spironolactone (Aldactone) 25 mg tablet [id number]  TAKE 1 TABLET BY MOUTH EVERY DAY Mike Laura DO  Active   thiamine (Vitamin B-1) 100 mg tablet [id number]  Take 1 tablet (100 mg) by mouth once daily.   Patient not taking: Reported on 11/12/2024    Demarco Gonzalez MD  Active                    DISEASE MANAGEMENT ASSESSMENT:     ANTICOAGULATION ASSESSMENT    The ASCVD Risk score (Tomasz GIRALDO, et al., 2019) failed to calculate for the following reasons:    The valid total cholesterol range is 130 to 320 mg/dL    DIAGNOSIS: prevention of nonvalvular atrial fibrilliation stroke and systemic embolism  - Patient is projected to be on anticoagulation long term  - [id number] Score: [4] (only included if diagnosis is atrial fibrillation)   Age: [<65 (0)] [65-74 (+1)] [> 75 (+2)]: 1  Sex: [Male/Female (+1)]: 1  CHF history: [No/Yes(+1)]: 0  Hypertension history: [No/Yes(+1)]: 1  Stroke/TIA/thromboembolism history: [No/Yes(+2)]: 0  Vascular disease history (prior MI, peripheral artery disease, aortic plaque): [No/Yes(+1)]: 1  Diabetes history: [No/Yes(+1)]: 0    CURRENT PHARMACOTHERAPY:    Eliquis 5mg twice daily  74yo  57.2kg  Scr: 0.68 (02/26/2025)    RELEVANT PAST MEDICAL HISTORY:   Afib, HTN, HLD,  PAD    Affordability/Accessibility:  PAP   Adherence/Organization: reports adherence   Adverse Reactions: none reported  Recent Hospitalizations: none  Recent Falls/Trauma: none reported  Changes in Tobacco or Alcohol Intake:   Tobacco: 1 ppd   Alcohol: does not use    EDUCATION/COUNSELING:   - Counseled patient on MOA, expectations, duration of therapy, contraindications, administration, and monitoring parameters  - Counseled patient of side effects that are indicative of bleeding such as dark tarry stool, unexplainable bruising, or vomiting up a coffee ground like substance     HYPERCHOLESTEROLEMIA ASSESSMENT    RECENT LIPID PANEL (DATE): 02/26/2025  Lab Results   Component Value Date    TRIG 74 02/26/2025    CHOL 122 02/26/2025    LDLCALC 55 02/26/2025    HDL 52 02/26/2025          ASCVD SCORE: The ASCVD Risk score (Tomasz GIRALDO, et al., 2019) failed to calculate for the following reasons:    The valid total cholesterol range is 130 to 320 mg/dL  Coronary Heart Disease (MI, angina, coronary artery stenosis): Yes   History of ischemic stroke? No   History of carotid artery stenosis? Yes   Peripheral artery disease? Yes   ASCVD RISK FACTORS:    CKD? No   Diabetes? No   HTN? Yes   Persistently elevated LDL? Yes   Elevated triglycerides? No   Inflammatory diseases (rheumatoid arthritis, psoriasis, HIV)? Yes    CURRENT PHARMACOTHERAPY  - Statin? No- worsening liver dysfunction when on statin  - Ezetimibe? No  - PCSK9-I? Yes, describe: Repatha 140mg every 14 days  - Other lipid lowering agents? No      RELEVANT PAST MEDICAL HISTORY:   Carotid stenosis, HLD, HTN, PAD    ASSESSMENT    Affordability/Accessibility:  PAP  Adherence/Organization: non-adherent due to lack of insurance, last repatha injection was 1 month ago  Adverse Effects: patient reports occasional runny nose, otherwise well tolerated  Recent Hospitalizations: No  Diet: potato soup, no canned food, watches salt intake, drinks water, occasional pepsi/coke,  omelet and toast   Exercise: No- patient is member of rec center and plans to work out with machines, she was previously doing water aerobics until it got cold outside  Tobacco Use: Yes, describe: 1 ppd  Alcohol Use: No  Next Lipid Panel: TBD      EDUCATION/COUNSELING:  - Counseled patient on MOA, expectations, side effects, duration of therapy, contraindications, administration, and monitoring parameters  - Answered all patient questions and concerns       DISCUSSION/NOTES:   Patient states she is doing well on anticoagulation therapy. Patient reports adherence, no adverse effects, and denies s/s of bleeding.   Patient has missed 1 month of Repatha due to lack of insurance. Insurance has been reinstated, [facility] requested refill. Patient to restart Repatha 140mg every 14 days. Patient reports previously tolerating medication well, she states she gets an occasional runny nose but nothing bothersome. Patient diet remains unchanged. Lipid panel results were discussed, patient is now at goal for LDL.  Patient reports her insurance has been reinstated and therefore patient is able to get refills through  PAP.     ASSESSMENT:    Assessment/Plan   Problem List Items Addressed This Visit       Carotid stenosis    Relevant Orders    Referral to Clinical Pharmacy    Familial combined hyperlipidemia     LDL at goal. Plan to continue Repatha 140mg injection every 14 days.            Relevant Orders    Referral to Clinical Pharmacy    PAD (peripheral artery disease) (CMS-[facility])    Relevant Orders    Referral to Clinical Pharmacy    Atrial fibrillation with RVR (Multi)     Patient age, weight and renal function appropriate to continue Eliquis 5mg twice daily.          Relevant Orders    Referral to Clinical Pharmacy       RECOMMENDATIONS/PLAN:    CONTINUE  Eliquis 5mg twice daily  Repatha 140mg every 14 days as prescribed    Last Appnt with Referring Provider: 11/12/2024  Next Appnt with Referring Provider: 04/15/2025  Clinical  Pharmacist follow up: 4 months  VAF/Application Expiration: Yes; 01/03/2026  Type of Encounter: Tati Kern PharmD    Verbal consent to manage patient's drug therapy was obtained from the patient . They were informed they may decline to participate or withdraw from participation in pharmacy services at any time.    Continue all meds under the continuation of care with the referring provider and clinical pharmacy team.

## 2025-04-02 NOTE — Clinical Note
Tano Laura,  Today I spoke with Svitlana about her Eliquis and Repatha. Patient states she is doing well on anticoagulation therapy. Patient reports adherence, no adverse effects, and denies s/s of bleeding. Patient has missed 1 month of Repatha due to lack of insurance. Insurance has been reinstated and patient understands to restart on Repatha 140mg every 14 days. Plan to follow up in 4 months. Thank you!

## 2025-04-03 PROCEDURE — RXMED WILLOW AMBULATORY MEDICATION CHARGE

## 2025-04-04 ENCOUNTER — PHARMACY VISIT (OUTPATIENT)
Dept: PHARMACY | Facility: CLINIC | Age: 75
End: 2025-04-04
Payer: MEDICARE

## 2025-04-10 LAB
ALBUMIN SERPL-MCNC: 4.4 G/DL (ref 3.6–5.1)
ALBUMIN SERPL-MCNC: 4.4 G/DL (ref 3.6–5.1)
ALBUMIN/GLOB SERPL: 1.8 (CALC) (ref 1–2.5)
ALP SERPL-CCNC: 77 U/L (ref 37–153)
ALT SERPL-CCNC: 10 U/L (ref 6–29)
AST SERPL-CCNC: 15 U/L (ref 10–35)
BASOPHILS # BLD AUTO: 173 CELLS/UL (ref 0–200)
BASOPHILS NFR BLD AUTO: 1.9 %
BILIRUB DIRECT SERPL-MCNC: 0.1 MG/DL
BILIRUB INDIRECT SERPL-MCNC: 0.4 MG/DL (CALC) (ref 0.2–1.2)
BILIRUB SERPL-MCNC: 0.5 MG/DL (ref 0.2–1.2)
BNP SERPL-MCNC: 81 PG/ML
BUN SERPL-MCNC: 15 MG/DL (ref 7–25)
BUN/CREAT SERPL: NORMAL (CALC) (ref 6–22)
CALCIUM SERPL-MCNC: 9.5 MG/DL (ref 8.6–10.4)
CHLORIDE SERPL-SCNC: 106 MMOL/L (ref 98–110)
CO2 SERPL-SCNC: 24 MMOL/L (ref 20–32)
CREAT SERPL-MCNC: 0.66 MG/DL (ref 0.6–1)
EGFRCR SERPLBLD CKD-EPI 2021: 93 ML/MIN/1.73M2
EOSINOPHIL # BLD AUTO: 737 CELLS/UL (ref 15–500)
EOSINOPHIL NFR BLD AUTO: 8.1 %
ERYTHROCYTE [DISTWIDTH] IN BLOOD BY AUTOMATED COUNT: 14.1 % (ref 11–15)
GLOBULIN SER CALC-MCNC: 2.5 G/DL (CALC) (ref 1.9–3.7)
GLUCOSE SERPL-MCNC: 73 MG/DL (ref 65–99)
HCT VFR BLD AUTO: 40.1 % (ref 35–45)
HGB BLD-MCNC: 13.2 G/DL (ref 11.7–15.5)
INR PPP: 1.1
LYMPHOCYTES # BLD AUTO: 2885 CELLS/UL (ref 850–3900)
LYMPHOCYTES NFR BLD AUTO: 31.7 %
MCH RBC QN AUTO: 30.7 PG (ref 27–33)
MCHC RBC AUTO-ENTMCNC: 32.9 G/DL (ref 32–36)
MCV RBC AUTO: 93.3 FL (ref 80–100)
MONOCYTES # BLD AUTO: 446 CELLS/UL (ref 200–950)
MONOCYTES NFR BLD AUTO: 4.9 %
NEUTROPHILS # BLD AUTO: 4859 CELLS/UL (ref 1500–7800)
NEUTROPHILS NFR BLD AUTO: 53.4 %
PHOSPHATE SERPL-MCNC: 4.1 MG/DL (ref 2.1–4.3)
PLATELET # BLD AUTO: 324 THOUSAND/UL (ref 140–400)
PMV BLD REES-ECKER: 9.1 FL (ref 7.5–12.5)
POTASSIUM SERPL-SCNC: 4.5 MMOL/L (ref 3.5–5.3)
PROT SERPL-MCNC: 6.9 G/DL (ref 6.1–8.1)
PROTHROMBIN TIME: 11.5 SEC (ref 9–11.5)
RBC # BLD AUTO: 4.3 MILLION/UL (ref 3.8–5.1)
SODIUM SERPL-SCNC: 142 MMOL/L (ref 135–146)
WBC # BLD AUTO: 9.1 THOUSAND/UL (ref 3.8–10.8)

## 2025-04-15 ENCOUNTER — HOSPITAL ENCOUNTER (OUTPATIENT)
Dept: VASCULAR MEDICINE | Facility: CLINIC | Age: 75
Discharge: HOME | End: 2025-04-15
Payer: MEDICARE

## 2025-04-15 ENCOUNTER — APPOINTMENT (OUTPATIENT)
Dept: CARDIOLOGY | Facility: CLINIC | Age: 75
End: 2025-04-15
Payer: MEDICARE

## 2025-04-15 ENCOUNTER — HOSPITAL ENCOUNTER (OUTPATIENT)
Dept: RADIOLOGY | Facility: CLINIC | Age: 75
Discharge: HOME | End: 2025-04-15
Payer: MEDICARE

## 2025-04-15 DIAGNOSIS — I71.40 ABDOMINAL AORTIC ANEURYSM (AAA) WITHOUT RUPTURE, UNSPECIFIED PART: ICD-10-CM

## 2025-04-15 DIAGNOSIS — I73.9 PAD (PERIPHERAL ARTERY DISEASE) (CMS-HCC): ICD-10-CM

## 2025-04-15 DIAGNOSIS — I71.43 INFRARENAL ABDOMINAL AORTIC ANEURYSM (AAA) WITHOUT RUPTURE: ICD-10-CM

## 2025-04-15 DIAGNOSIS — I65.23 BILATERAL CAROTID ARTERY STENOSIS: ICD-10-CM

## 2025-04-15 PROCEDURE — 93978 VASCULAR STUDY: CPT | Performed by: RADIOLOGY

## 2025-04-15 PROCEDURE — 93923 UPR/LXTR ART STDY 3+ LVLS: CPT | Performed by: INTERNAL MEDICINE

## 2025-04-15 PROCEDURE — 93880 EXTRACRANIAL BILAT STUDY: CPT

## 2025-04-15 PROCEDURE — 93880 EXTRACRANIAL BILAT STUDY: CPT | Performed by: RADIOLOGY

## 2025-04-15 PROCEDURE — 93978 VASCULAR STUDY: CPT

## 2025-04-15 PROCEDURE — 93923 UPR/LXTR ART STDY 3+ LVLS: CPT

## 2025-04-21 ENCOUNTER — OFFICE VISIT (OUTPATIENT)
Dept: CARDIOLOGY | Facility: CLINIC | Age: 75
End: 2025-04-21
Payer: MEDICARE

## 2025-04-21 VITALS
BODY MASS INDEX: 20.25 KG/M2 | OXYGEN SATURATION: 100 % | DIASTOLIC BLOOD PRESSURE: 71 MMHG | WEIGHT: 126 LBS | HEART RATE: 67 BPM | HEIGHT: 66 IN | SYSTOLIC BLOOD PRESSURE: 138 MMHG

## 2025-04-21 DIAGNOSIS — E78.49 FAMILIAL COMBINED HYPERLIPIDEMIA: ICD-10-CM

## 2025-04-21 DIAGNOSIS — F17.211 CIGARETTE NICOTINE DEPENDENCE IN REMISSION: ICD-10-CM

## 2025-04-21 DIAGNOSIS — I48.91 ATRIAL FIBRILLATION WITH RVR (MULTI): Primary | ICD-10-CM

## 2025-04-21 DIAGNOSIS — F17.200 TOBACCO DEPENDENCY: ICD-10-CM

## 2025-04-21 DIAGNOSIS — E78.2 MODERATE MIXED HYPERLIPIDEMIA NOT REQUIRING STATIN THERAPY: ICD-10-CM

## 2025-04-21 DIAGNOSIS — I71.43 INFRARENAL ABDOMINAL AORTIC ANEURYSM (AAA) WITHOUT RUPTURE: ICD-10-CM

## 2025-04-21 DIAGNOSIS — I48.0 PAROXYSMAL ATRIAL FIBRILLATION (MULTI): ICD-10-CM

## 2025-04-21 DIAGNOSIS — I10 HYPERTENSION, UNSPECIFIED TYPE: ICD-10-CM

## 2025-04-21 DIAGNOSIS — F10.21 ALCOHOL DEPENDENCE IN REMISSION (MULTI): ICD-10-CM

## 2025-04-21 PROCEDURE — 1157F ADVNC CARE PLAN IN RCRD: CPT | Performed by: INTERNAL MEDICINE

## 2025-04-21 PROCEDURE — 3008F BODY MASS INDEX DOCD: CPT | Performed by: INTERNAL MEDICINE

## 2025-04-21 PROCEDURE — 3075F SYST BP GE 130 - 139MM HG: CPT | Performed by: INTERNAL MEDICINE

## 2025-04-21 PROCEDURE — 99214 OFFICE O/P EST MOD 30 MIN: CPT | Performed by: INTERNAL MEDICINE

## 2025-04-21 PROCEDURE — 1126F AMNT PAIN NOTED NONE PRSNT: CPT | Performed by: INTERNAL MEDICINE

## 2025-04-21 PROCEDURE — 93005 ELECTROCARDIOGRAM TRACING: CPT | Performed by: INTERNAL MEDICINE

## 2025-04-21 PROCEDURE — 1159F MED LIST DOCD IN RCRD: CPT | Performed by: INTERNAL MEDICINE

## 2025-04-21 PROCEDURE — 3078F DIAST BP <80 MM HG: CPT | Performed by: INTERNAL MEDICINE

## 2025-04-21 PROCEDURE — 93010 ELECTROCARDIOGRAM REPORT: CPT | Performed by: INTERNAL MEDICINE

## 2025-04-21 PROCEDURE — 99406 BEHAV CHNG SMOKING 3-10 MIN: CPT | Performed by: INTERNAL MEDICINE

## 2025-04-21 PROCEDURE — 99406 BEHAV CHNG SMOKING 3-10 MIN: CPT | Mod: 25 | Performed by: INTERNAL MEDICINE

## 2025-04-21 PROCEDURE — 1160F RVW MEDS BY RX/DR IN RCRD: CPT | Performed by: INTERNAL MEDICINE

## 2025-04-21 RX ORDER — SPIRONOLACTONE 25 MG/1
25 TABLET ORAL DAILY
Qty: 90 TABLET | Refills: 3 | Status: SHIPPED | OUTPATIENT
Start: 2025-04-21

## 2025-04-21 RX ORDER — NADOLOL 20 MG/1
20 TABLET ORAL DAILY
Qty: 90 TABLET | Refills: 3 | Status: SHIPPED | OUTPATIENT
Start: 2025-04-21 | End: 2026-04-21

## 2025-04-21 RX ORDER — EVOLOCUMAB 140 MG/ML
140 INJECTION, SOLUTION SUBCUTANEOUS
Qty: 2 ML | Refills: 11 | Status: SHIPPED | OUTPATIENT
Start: 2025-04-21

## 2025-04-21 ASSESSMENT — ENCOUNTER SYMPTOMS: OCCASIONAL FEELINGS OF UNSTEADINESS: 1

## 2025-04-21 ASSESSMENT — PAIN SCALES - GENERAL: PAINLEVEL_OUTOF10: 0-NO PAIN

## 2025-04-21 NOTE — ASSESSMENT & PLAN NOTE
Smoking Hynpnosis   Address: [address], [place] [id number]  Phone: [phone]

## 2025-04-21 NOTE — PROGRESS NOTES
Referred by FAITH Laura for Follow-up     HPI:    Svitlana Perry is a 75 y.o. female with pertinent history of COPD, hypertension, hyperlipidemia, alcohol and tobacco use disorder, pancreatitis, PVD, recent hospitalization for generalized fatigue and hypomagnesemia/hypokalemia  presents to cardiology clinic to establish care.     Recall, she was admitted to [facility] 20 4-23 through 5/27/2024 and repeat admission 5/29 through 531.  She initially presented with failure to thrive, confusion and inability to ambulate.  In the emergency department she was hypoxic, tachycardic and very confused.  She was in atrial fibrillation with rates to the 160s to 170s.  She was urgently cardioverted in the ED and started on heparin as well as IV amiodarone drip.  She was then transferred to stepdown unit where she was continued on amiodarone and ultimately switched to oral amiodarone plus oral Eliquis.  She was noted to have urinary tract infection was treated with 7-day duration of oral ciprofloxacin.  She was subsequently discharged.    Since she has returned home, she has overall done well.  She feels much more alert, more energetic.  She has not missed doses of her anticoagulation.  She has not felt heart palpitations.    11/12/2024 -- She reports that she has had intermittent periods of PND / Orthopnea.  She will fall asleep on her cough and then when she wakes up, she notes that she needs to do pursed lipped breathing for a few minutes and then she can go back to sleep. She notes that she has been using Treleghy once a day. She notes that she uses her Nebulizer frequently. She continues to smoke at least 1 PPD. Reports that she travels to [place] to Buy about 20 cartons at a time from [employer] stores.      4/21/2025 --> She returns for follow up. She reports taht she has overall been well. She has had some difficulty with follow up with her PCP, having seen his Assistant mostly.  Notes continued shortness  of breath, but also admits to continued smoking.  She has not noticed any increase in her palpitations.  She was able to complete most testing, wonders about her ultrasound to look at her aneurysm.  She does have follow-up with vascular surgery tomorrow.  She has not noticed any atrial fibrillation, nor has she felt any heart palpitations.          No exacerbating or relieving factors.  Patient denies chest pain and angina.  Pt denies orthopnea, and paroxysmal nocturnal dyspnea.  Pt denies worsening lower extremity edema.  Pt denies palpitations or syncope.  No recent falls.  No fever or chills.  No cough.  No change in bowel or bladder habits.  No sick contacts.  No recent travel.    12 point review of systems including (Constitutional, Eyes, ENMT, Respiratory, Cardiac, Gastrointestinal, Neurological, Psychiatric, and Hematologic) was performed and is otherwise negative.    Past medical history reviewed:   has no past medical history on file.    Past surgical history reviewed:   has a past surgical history that includes Other surgical history (02/15/2016); MR angio neck wo IV contrast (08/19/2019); MR angio head wo IV contrast (08/19/2019); MR angio neck wo IV contrast (11/30/2022); and Appendectomy.    Social history reviewed:   reports that she has been smoking cigarettes. She started smoking about 55 years ago. She has a 55.3 pack-year smoking history. She has never used smokeless tobacco. She reports that she does not currently use alcohol. She reports that she does not use drugs.     Family history reviewed:  No family history on file.    Allergies reviewed: Patient has no known allergies.     Medications reviewed:   Current Outpatient Medications   Medication Instructions    albuterol 90 mcg/actuation inhaler 2 puffs, Every 4 hours PRN    apixaban (ELIQUIS) 5 mg, oral, 2 times daily    aspirin 81 mg, oral, Daily    flaxseed oiL 1,000 mg, oral, Daily    fluticasone furoate-vilanteroL (Breo Ellipta) 100-25  "mcg/dose inhaler 1 puff, inhalation, Daily    fluticasone furoate-vilanteroL (Breo Ellipta) 100-25 mcg/dose inhaler 1 puff, inhalation, Daily    fluticasone-umeclidin-vilanter (Trelegy Ellipta) 100-62.5-25 mcg blister with device 1 puff, inhalation, Daily    folic acid (FOLVITE) 1 mg, oral, Daily    ipratropium-albuteroL (Duo-Neb) 0.5-2.5 mg/3 mL nebulizer solution 3 mL, nebulization, 3 times daily RT    magnesium oxide 400 mg, oral, Nightly    nadolol (CORGARD) 20 mg, oral, Daily    potassium chloride CR (Klor-Con) 10 mEq ER tablet 10 mEq, oral, Daily, Do not crush, chew, or split.    Repatha SureClick 140 mg, subcutaneous, Every 14 days    solifenacin (VESICARE) 5 mg, oral, Daily, Swallow tablet whole; do not crush, chew, or split.    spironolactone (ALDACTONE) 25 mg, oral, Daily    thiamine (VITAMIN B-1) 100 mg, oral, Daily        Vitals reviewed: Visit Vitals  /71 (BP Location: Right arm, Patient Position: Sitting)   Pulse 67         Physical Exam:   /71 (BP Location: Right arm, Patient Position: Sitting)   Pulse 67   Ht 1.664 m (5' 5.5\")   Wt 57.2 kg (126 lb)   SpO2 100%   BMI 20.65 kg/m²   General:  Patient is awake, alert, and oriented.  Patient is in no acute distress.  HEENT:  Pupils equal and reactive.  Normocephalic.  Moist mucosa.    Neck:  No thyromegaly.  Normal Jugular Venous Pressure.  Cardiovascular:  Regular rate and rhythm.  Normal S1 and S2.  1/6 RAFAEL.  Pulmonary:  Clear to auscultation bilaterally.  Abdomen:  Soft. Non-tender.   Non-distended.  Positive bowel sounds.  Lower Extremities:  2+ pedal pulses. No LE edema.  Neurologic:  Cranial nerves intact.  No focal deficit.   Skin: Skin warm and dry, normal skin turgor.   Psychiatric: Normal affect.    Last Labs:  CBC -      Lab Results   Component Value Date    WBC 9.1 04/09/2025    HGB 13.2 04/09/2025    HCT 40.1 04/09/2025     04/09/2025        CMP-  Lab Results   Component Value Date    GLUCOSE 73 04/09/2025     " 04/09/2025    K 4.5 04/09/2025     04/09/2025    CO2 24 04/09/2025    ANIONGAP 10 02/26/2025    BUN 15 04/09/2025    CREATININE 0.66 04/09/2025    EGFR 93 04/09/2025    CALCIUM 9.5 04/09/2025    PHOS 4.1 04/09/2025    PROT 6.9 04/09/2025    ALBUMIN 4.4 04/09/2025    ALBUMIN 4.4 04/09/2025    AST 15 04/09/2025    ALT 10 04/09/2025    ALKPHOS 77 04/09/2025    BILITOT 0.5 04/09/2025        LIPIDS-  Lab Results   Component Value Date    CHOL 122 02/26/2025    TRIG 74 02/26/2025    HDL 52 02/26/2025    CHHDL 2.3 02/26/2025    VLDL 22 11/21/2024        OTHERS-  Lab Results   Component Value Date    HGBA1C 4.1 05/24/2024    BNP 81 04/09/2025        I personally reviewed the patient's recent vitals, labs, medications, orders, EKGs, pertinent cardiac imaging/ echocardiography and ischemic evaluations including stress testing/ cardiac catheterization.    Assessment and Plan:  Problem List Items Addressed This Visit       Atrial fibrillation with RVR (Multi) - Primary    Overview   Paroxysmal Atrial Fibrillation noted   [id number] Stroke Risk Points: 3   Values used to calculate this score:    Points  Metrics       0        Has Congestive Heart Failure: No       1        Has Hypertension: Yes       1        Age: 74       0        Has Diabetes: No       0        Had Stroke: No                 Had TIA: No                 Had Thromboembolism: No       0        Has Vascular Disease: No       1        Clinically Relevant Sex: Female             Relevant Medications    nadolol (Corgard) 20 mg tablet    apixaban (Eliquis) 5 mg tablet    Other Relevant Orders    ECG 12 lead (Clinic Performed)    Familial combined hyperlipidemia    Overview   The ASCVD Risk score (Tomasz GIRALDO, et al., 2019) failed to calculate for the following reasons:    The valid total cholesterol range is 130 to 320 mg/dL    Lab Results   Component Value Date    CHOL 122 02/26/2025    CHOL 210 (H) 11/21/2024    CHOL 252 (H) 07/12/2024     Lab Results  "  Component Value Date    HDL 52 02/26/2025    HDL 46.6 11/21/2024    HDL 42.6 07/12/2024     Lab Results   Component Value Date    LDLCALC 55 02/26/2025    LDLCALC 142 (H) 11/21/2024    LDLCALC 183 (H) 07/12/2024     Lab Results   Component Value Date    TRIG 74 02/26/2025    TRIG 108 11/21/2024    TRIG 132 07/12/2024     No components found for: \"CHOLHDL\"           Current Assessment & Plan   Remain on Evolocumab with excellent results.          Relevant Medications    evolocumab (Repatha SureClick) 140 mg/mL injection    HTN (hypertension)    Relevant Medications    spironolactone (Aldactone) 25 mg tablet    Hyperlipidemia    Overview   She has a Hx of Cirrhosis and has had evidence of worsening liver dysfunction when on Statin Therapy          Infrarenal abdominal aortic aneurysm (AAA) without rupture    Overview   Following with Vascular Surgery at this time.          Nicotine dependence    Overview   Tobacco Counseling  The patient smokes cigarettes and desires to quit.  We created the following quit plan:  Reviewed STAR protocol and added to patient instructions.  Quit assistance referrals: electronic quit line referral given, gave quit line number (1-800-QUIT-NOW), electronic referral to Smokefree.gov given, and She wishes to explore Hypnosis .         Current Assessment & Plan   Smoking Hynpnosis   Address: [address] [place] [address] [address], [place] [address]  Phone: [phone]          Other Visit Diagnoses         Paroxysmal atrial fibrillation (Multi)        Relevant Medications    nadolol (Corgard) 20 mg tablet      Tobacco dependency          Alcohol dependence in remission (Multi)                    Please followup with me in Cardiology clinic within the next 3-4 Months   Please return to clinic sooner or seek emergent care if your symptoms reoccur or worsen.    Thank you for allowing me to participate in their care.  Please feel free to call me with any further questions or concerns.        Mike" SOFIA Laura DO   Division of Cardiovascular Medicine  [facility], [facility]

## 2025-04-21 NOTE — PATIENT INSTRUCTIONS
Quitting Smoking    Quitting smoking is the most important step you can take to improve your health. We're glad you have set a goal to improve your health.    Quit Smoking Resources      Smoking Cessation   Please stop smoking.    --Try to cut back on the number of cigarettes that you smoke.    -- Try to increase the interval between cigarettes.   -- Try to use substitutes such as sugar-free candy carrots,celery sticks as in between.  --  Once you are down to less than half a pack a day try to go cold turkey.   -- Try to designate areas in the your home as smoke-free areas.   -- Try to reduce the number of ashtrays and other reminders of smoking.   -- Try to keep any major something that you dislike next to your cigarette pack to try to develop a mental aversion to smoking    [facility]  5.0(2) · Hypnotherapy service  [place], [place] · [phone]    Marin Alvarado M.A.  4.2(20) · Hypnotherapy service  [place], [place] · [phone]      In addition to medications, use the STAR plan to help you successfully quit.   Stick with your quit date!   Tell friends, family, and coworkers your quit date. Request their understanding and support.  Anticipate and prepare for challenges. Some examples are withdrawal symptoms, being around others who smoke, and drinking alcohol.  Remove all tobacco products and paraphernalia from your environment. Make your home and vehicles smoke-free.    Free resources for additional support:  National tobacco quitline: 1-800-QUIT-NOW (1-161.138.4034).  SmokefreeTXT is a free text program to assist you in quitting. Visit https://www.smokefree.gov/smokefreetxt for more information.  Feel free to call your care manager at [phone]) for additional support.    It was a pleasure seeing you today. I would like to see you back in clinic in 3-4 months  . You can call my office if questions arise between now and our next visit.     Today, we talked about your atrial  "fibrillation   -- Please continue to use Apixaban ( Eliquis ) 5 mg Twice Daily to prevent strokes  -- Please begin a medication call Nadolol. This will help with blood pressure and heart rates. We will start at a low dose of 20 mg Daily   -- Please continue Spironolactone 25 mg to help your blood pressure and your liver function   -- Please schedule a n appointment with a Vascular Surgeon to look at your neck artery   -- Please have blood work done before our next appointment.       Below are some Heart Health Tips that we provide to all of our patients. I hope you find them useful.     - If you are having problems with medications, consider looking at the following websites.   --  \"GoodRx\"   --  \"[facility]\"      - We are happy to supply written prescriptions if needed to allow you to obtain your medications from different pharmacies. Additionally, if you are having issues with mail order delivery, please let us know. We can send a limited supply of your medications to your local pharmacy.     -  We recommend you follow a heart healthy diet. Watch food labels and try not to eat more than 2,500 mg of sodium per day. Avoid foods high in salt like processed meats (lunch meats, taylor, and sausage), processed foods (boxed dinners, canned soups), fried and fast foods. Monitor serving sizes and if the sodium per serving size is more than 200 mg, avoid those foods. If the sodium per serving size is between 100-200 mg, you can use those in limited quantities. Try to choose foods where the amount of sodium per serving size is less than 100 mg. Try to eat a diet rich in fruits and vegetables, whole grains, low fat dairy products, skinless poultry and fish, nuts, beans, non-tropical vegetable oils. Limit saturated fat, trans fat, sodium, red meats, and sugar-sweetened beverages.   Limit alcohol     -The combination of a reduced-calorie diet and increased physical activity is recommended. Adults should " aim to get at least 150 minutes of moderate physical activity per week (30 minutes of moderate physical activities at least 5 days per week). Examples of moderate physical activities include brisk walking, swimming, aerobic dancing, heavy gardening, jumping rope, bicycling 10 MPH or faster, tennis, hiking uphill or with a heavy backpack. Please let us know if you would like to learn more about your nutrition and calories and additional options including weight loss programs to help you reach your goal.     -If you smoke, stop smoking. If you stop smoking you can help get rid of a major source of stress to your heart. Smoking makes your heart rate and blood pressure go up and increases your risk or developing cardiovascular diseases and worsen symptoms associated with heart failure.     -Obtain a BP monitor and monitor your BP daily. Check it around the same time each day; at least 1 hour after taking your medications. Record your BP in a log and bring your log with you to your doctors appointment.     -F/u with your PCP as recommended.

## 2025-04-22 ENCOUNTER — OFFICE VISIT (OUTPATIENT)
Dept: VASCULAR SURGERY | Facility: HOSPITAL | Age: 75
End: 2025-04-22
Payer: MEDICARE

## 2025-04-22 ENCOUNTER — HOSPITAL ENCOUNTER (EMERGENCY)
Facility: HOSPITAL | Age: 75
Discharge: LEFT BEFORE DISCHARGE | End: 2025-04-22
Payer: MEDICARE

## 2025-04-22 VITALS
DIASTOLIC BLOOD PRESSURE: 70 MMHG | WEIGHT: 129 LBS | BODY MASS INDEX: 21.49 KG/M2 | HEART RATE: 84 BPM | SYSTOLIC BLOOD PRESSURE: 130 MMHG | OXYGEN SATURATION: 97 % | HEIGHT: 65 IN

## 2025-04-22 DIAGNOSIS — I71.43 INFRARENAL ABDOMINAL AORTIC ANEURYSM (AAA) WITHOUT RUPTURE: Primary | ICD-10-CM

## 2025-04-22 DIAGNOSIS — I65.22 STENOSIS OF LEFT CAROTID ARTERY: ICD-10-CM

## 2025-04-22 LAB
ATRIAL RATE: 67 BPM
P AXIS: 73 DEGREES
P OFFSET: 211 MS
P ONSET: 155 MS
PR INTERVAL: 140 MS
Q ONSET: 225 MS
QRS COUNT: 11 BEATS
QRS DURATION: 70 MS
QT INTERVAL: 380 MS
QTC CALCULATION(BAZETT): 401 MS
QTC FREDERICIA: 394 MS
R AXIS: 76 DEGREES
T AXIS: 70 DEGREES
T OFFSET: 415 MS
VENTRICULAR RATE: 67 BPM

## 2025-04-22 PROCEDURE — 3078F DIAST BP <80 MM HG: CPT | Performed by: STUDENT IN AN ORGANIZED HEALTH CARE EDUCATION/TRAINING PROGRAM

## 2025-04-22 PROCEDURE — 1157F ADVNC CARE PLAN IN RCRD: CPT | Performed by: STUDENT IN AN ORGANIZED HEALTH CARE EDUCATION/TRAINING PROGRAM

## 2025-04-22 PROCEDURE — 99214 OFFICE O/P EST MOD 30 MIN: CPT | Performed by: STUDENT IN AN ORGANIZED HEALTH CARE EDUCATION/TRAINING PROGRAM

## 2025-04-22 PROCEDURE — 3074F SYST BP LT 130 MM HG: CPT | Performed by: STUDENT IN AN ORGANIZED HEALTH CARE EDUCATION/TRAINING PROGRAM

## 2025-04-22 PROCEDURE — 3008F BODY MASS INDEX DOCD: CPT | Performed by: STUDENT IN AN ORGANIZED HEALTH CARE EDUCATION/TRAINING PROGRAM

## 2025-04-22 PROCEDURE — 1159F MED LIST DOCD IN RCRD: CPT | Performed by: STUDENT IN AN ORGANIZED HEALTH CARE EDUCATION/TRAINING PROGRAM

## 2025-04-22 PROCEDURE — 4500999001 HC ED NO CHARGE

## 2025-04-22 NOTE — PATIENT INSTRUCTIONS
Follow up in 6 months with repeat carotid duplex  Need to quit smoking to consider repair  Repeat ANTONIO and AAA duplex in 1 year

## 2025-04-24 NOTE — PROGRESS NOTES
"Primary Care Physician: @PCP@  Referring Provider: No referring provider defined for this encounter.  Date of Visit: 04/22/2025  2:40 PM EDT  Location of visit: [facility]     Chief Complaint:   No chief complaint on file.       HPI / Summary:   Svitlana Perry is a 75 y.o. female presents for follow-up regarding carotid stenosis, aortic aneurysm, PAD.     Regarding her carotid stenosis, she had repeat carotid duplex which noted velocities of 445 in her left ICA as well as a ratio of 7.4.  Her right side had a velocity of 212 with a ratio of 3.5.  Her left side is slightly higher than her last carotid duplex performed 6 months ago.  6 months ago her left ICA velocity was 354 and her ratio was 6.7.  However in her duplex in 2020, her velocities that he was 355 and her ratio was 7.4.  Therefore I believe her studies are grossly similar to prior.  She does not endorse any strokelike symptoms.  She does endorse some confusion and mild cognitive impairment.    Regarding her AAA, her AAA measured 3.6 cm on her most recent duplex.  She last had a CT abdomen pelvis September 2023 where it measured 3.4 cm.  Therefore this is not a significant change and she can continue with active surveillance.    Regarding her PAD, patient still endorses significant pain in both legs from her hip down to her toes.  She states that both of her legs feel \"tight\" and like someone is pulling her muscle apart.  Her symptoms do not sound like claudication as they hurt all the time and are not necessarily worse with walking.  She also does not really describe rest pain, as her feet do not hurt all the time and she does not put her leg in the dependent position for relief.  She does have a burning sensation in her feet.  Patient has had a previous significant workup for this.      Regarding her tobacco use, patient still states she is motivated to quit but has not made attempts to do so since her last appointment 6 months ago.  She " "still wants to try hypnosis.  She is not interested in tobacco cessation counseling.      PMH: COPD, HTN, HLD, alcoholic cirrhosis  PSH: appendectomy, wrist surgery  Soc: + smoking, former alcohol user  Fam: mother with AAA     Past Vascular Testing:     Allergies:   Patient has no known allergies.    Outpatient Medications:  Current Medications[1]  Review of Systems:  Review of Systems     Physical Exam:  Blood pressure 130/70, pulse 84, height 1.651 m (5' 5\"), weight 58.5 kg (129 lb), SpO2 97%.  Wt Readings from Last 1 Encounters:   04/22/25 58.5 kg (129 lb)     Physical Exam  Neuro: alert and oriented x3  Resp: comfortable on room air  CV: well perfused  Abd: soft ntnd  Pulse exam:   Multiphasic DP PT signals bilaterally  Both feet are warm and well-perfused, normal cap refill  Palpable femoral pulses bilaterally    Last Labs:  CMP:    Chemistry    Lab Results   Component Value Date/Time     04/09/2025 1042    K 4.5 04/09/2025 1042     04/09/2025 1042    CO2 24 04/09/2025 1042    BUN 15 04/09/2025 1042    CREATININE 0.66 04/09/2025 1042    Lab Results   Component Value Date/Time    CALCIUM 9.5 04/09/2025 1042    ALKPHOS 77 04/09/2025 1042    AST 15 04/09/2025 1042    ALT 10 04/09/2025 1042    BILITOT 0.5 04/09/2025 1042          CBC:  Lab Results   Component Value Date    WBC 9.1 04/09/2025    HGB 13.2 04/09/2025    HCT 40.1 04/09/2025    MCV 93.3 04/09/2025     04/09/2025     HEME/ENDO:  Recent Labs     11/21/24  1745 05/24/24  0752 12/15/20  1700 05/31/20  0457   FERRITIN  --  3,541*  --  3,246*   IRONSAT  --   --   --  NOT CALC.   TSH 4.78* 3.38   < >  --    HGBA1C  --  4.1  --   --     < > = values in this interval not displayed.      CARDIAC: No data recorded  Lab Results   Component Value Date    LDLCALC 55 02/26/2025       Notable Studies:         Assessment/Plan   75-year-old female with past medical history of COPD, HTN, HLD, cirrhosis who presents for follow-up regarding carotid " stenosis, AAA, PAD.    #Carotid stenosis  - Continue with best medical therapy, patient is on aspirin and Repatha as well as Eliquis.  - Explained that patient is not a candidate for surgery given her continued smoking.  Explained that her velocity ratios have stayed essentially the same for the last 5 years which is reassuring.  I explained that she is at high risk of carotid restenosis if we proceed with surgery given her active smoking and no attempts to quit.  I did explain that she still has a risk of stroke with her elevated velocities, however she understands that her surgical risk is high with continued smoking and understands that elective carotid stenosis surgery will not be performed if she continues to smoke.  - Discussed return precautions.  Patient understands she is to present to the emergency room if she has any symptoms of blindness, weakness, slurred speech.    #AAA  - Grossly stable in size  - Repeat duplex in 1 year    #PAD  - Patient does have symptoms of significant PAD in both lower extremities.  She continues to smoke.  Her symptoms are stable and are not classically rest pain or claudication.  She has no wounds.  Her symptoms sound neuropathic in nature.  I explained that she is not a candidate for intervention given her active smoking.  I also explained that even if we were to perform intervention, given her symptoms, I do not believe that I would provide significant improvement in her symptoms with intervention.  I explained that if she were to develop severe pain at rest that keeps her up at night or wounds on her feet, she should present urgently to clinic.  Patient endorsed understanding    RTC 6 months with repeat carotid duplex            Orders:  No orders of the defined types were placed in this encounter.             ____________________________________________________________  Kaitlyn M Dunphy, MD  [facility]  [facility]       [1]   Current  Outpatient Medications   Medication Sig Dispense Refill    albuterol 90 mcg/actuation inhaler Inhale 2 puffs every 4 hours if needed.      apixaban (Eliquis) 5 mg tablet Take 1 tablet (5 mg) by mouth 2 times a day. 180 tablet 3    aspirin 81 mg EC tablet Take 1 tablet (81 mg) by mouth once daily. 30 tablet 11    evolocumab (Repatha SureClick) 140 mg/mL injection Inject 1 mL (140 mg) under the skin every 14 (fourteen) days. 2 mL 11    flaxseed oiL 1,000 mg capsule Take 1 capsule (1,000 mg) by mouth once daily. 90 capsule 3    fluticasone furoate-vilanteroL (Breo Ellipta) 100-25 mcg/dose inhaler Inhale 1 puff once daily. 60 each 3    fluticasone furoate-vilanteroL (Breo Ellipta) 100-25 mcg/dose inhaler Inhale 1 puff once daily. 60 each 3    fluticasone-umeclidin-vilanter (Trelegy Ellipta) 100-62.5-25 mcg blister with device Inhale 1 puff once daily. 60 each 3    ipratropium-albuteroL (Duo-Neb) 0.5-2.5 mg/3 mL nebulizer solution Take 3 mL by nebulization 3 times a day. 270 mL 2    nadolol (Corgard) 20 mg tablet Take 1 tablet (20 mg) by mouth once daily. 90 tablet 3    potassium chloride CR (Klor-Con) 10 mEq ER tablet Take 1 tablet (10 mEq) by mouth once daily. Do not crush, chew, or split. 30 tablet 11    solifenacin (Vesicare) 5 mg tablet Take 1 tablet (5 mg) by mouth once daily. Swallow tablet whole; do not crush, chew, or split. 30 tablet 11    spironolactone (Aldactone) 25 mg tablet Take 1 tablet (25 mg) by mouth once daily. 90 tablet 3    thiamine (Vitamin B-1) 100 mg tablet Take 1 tablet (100 mg) by mouth once daily. 30 tablet 11    folic acid (Folvite) 1 mg tablet Take 1 tablet (1 mg) by mouth once daily. (Patient not taking: Reported on 4/22/2025) 30 tablet 11    magnesium oxide 400 mg magnesium capsule Take 1 capsule (400 mg) by mouth once daily at bedtime. (Patient not taking: Reported on 4/22/2025) 90 capsule 3     Current Facility-Administered Medications   Medication Dose Route Frequency Provider Last Rate  Last Admin    evolocumab (Repatha SureClick) injection 140 mg  140 mg subcutaneous Once Mike Laura DO        fluticasone-umeclidin-vilanter (TRELEGY-ELLIPTA) 100-62.5-25 mcg 100-62.5-25 mcg/puff inhaler 1 puff  1 puff inhalation BID Demarco Gonzalez MD

## 2025-05-12 DIAGNOSIS — I48.91 ATRIAL FIBRILLATION WITH RVR (MULTI): Primary | ICD-10-CM

## 2025-05-12 DIAGNOSIS — E78.49 FAMILIAL COMBINED HYPERLIPIDEMIA: ICD-10-CM

## 2025-05-12 PROCEDURE — RXMED WILLOW AMBULATORY MEDICATION CHARGE

## 2025-05-12 RX ORDER — EVOLOCUMAB 140 MG/ML
140 INJECTION, SOLUTION SUBCUTANEOUS
Qty: 6 ML | Refills: 3 | Status: SHIPPED | OUTPATIENT
Start: 2025-05-12

## 2025-05-14 ENCOUNTER — PHARMACY VISIT (OUTPATIENT)
Dept: PHARMACY | Facility: CLINIC | Age: 75
End: 2025-05-14
Payer: MEDICARE

## 2025-06-29 PROCEDURE — RXMED WILLOW AMBULATORY MEDICATION CHARGE

## 2025-07-02 ENCOUNTER — PHARMACY VISIT (OUTPATIENT)
Dept: PHARMACY | Facility: CLINIC | Age: 75
End: 2025-07-02
Payer: MEDICARE

## 2025-07-30 PROCEDURE — RXMED WILLOW AMBULATORY MEDICATION CHARGE

## 2025-08-02 ENCOUNTER — PHARMACY VISIT (OUTPATIENT)
Dept: PHARMACY | Facility: CLINIC | Age: 75
End: 2025-08-02
Payer: MEDICARE

## 2025-08-06 ENCOUNTER — APPOINTMENT (OUTPATIENT)
Dept: PHARMACY | Facility: HOSPITAL | Age: 75
End: 2025-08-06

## 2025-08-14 ENCOUNTER — TELEMEDICINE (OUTPATIENT)
Dept: PHARMACY | Facility: HOSPITAL | Age: 75
End: 2025-08-14
Payer: MEDICARE

## 2025-08-14 DIAGNOSIS — E78.49 FAMILIAL COMBINED HYPERLIPIDEMIA: ICD-10-CM

## 2025-08-14 DIAGNOSIS — I48.91 ATRIAL FIBRILLATION WITH RVR (MULTI): ICD-10-CM

## 2025-08-14 DIAGNOSIS — I73.9 PAD (PERIPHERAL ARTERY DISEASE): ICD-10-CM

## 2025-08-14 DIAGNOSIS — I65.22 STENOSIS OF LEFT CAROTID ARTERY: ICD-10-CM

## 2025-12-04 ENCOUNTER — APPOINTMENT (OUTPATIENT)
Dept: PHARMACY | Facility: HOSPITAL | Age: 75
End: 2025-12-04
Payer: MEDICARE